# Patient Record
Sex: FEMALE | Race: WHITE | NOT HISPANIC OR LATINO | URBAN - METROPOLITAN AREA
[De-identification: names, ages, dates, MRNs, and addresses within clinical notes are randomized per-mention and may not be internally consistent; named-entity substitution may affect disease eponyms.]

---

## 2014-04-22 RX ORDER — DOCUSATE SODIUM 100 MG
1 CAPSULE ORAL
Qty: 0 | Refills: 0 | COMMUNITY
Start: 2014-04-22

## 2017-09-18 ENCOUNTER — EMERGENCY (EMERGENCY)
Facility: HOSPITAL | Age: 82
LOS: 1 days | Discharge: HOME | End: 2017-09-18
Attending: EMERGENCY MEDICINE | Admitting: EMERGENCY MEDICINE
Payer: MEDICARE

## 2017-09-18 VITALS
TEMPERATURE: 98 F | OXYGEN SATURATION: 95 % | SYSTOLIC BLOOD PRESSURE: 127 MMHG | HEART RATE: 61 BPM | DIASTOLIC BLOOD PRESSURE: 68 MMHG | RESPIRATION RATE: 16 BRPM

## 2017-09-18 VITALS
RESPIRATION RATE: 18 BRPM | SYSTOLIC BLOOD PRESSURE: 187 MMHG | HEART RATE: 72 BPM | OXYGEN SATURATION: 97 % | TEMPERATURE: 99 F | DIASTOLIC BLOOD PRESSURE: 92 MMHG

## 2017-09-18 DIAGNOSIS — M51.36 OTHER INTERVERTEBRAL DISC DEGENERATION, LUMBAR REGION: ICD-10-CM

## 2017-09-18 DIAGNOSIS — E03.9 HYPOTHYROIDISM, UNSPECIFIED: ICD-10-CM

## 2017-09-18 DIAGNOSIS — Z79.82 LONG TERM (CURRENT) USE OF ASPIRIN: ICD-10-CM

## 2017-09-18 DIAGNOSIS — D64.9 ANEMIA, UNSPECIFIED: ICD-10-CM

## 2017-09-18 DIAGNOSIS — Z98.0 INTESTINAL BYPASS AND ANASTOMOSIS STATUS: ICD-10-CM

## 2017-09-18 DIAGNOSIS — Z87.19 PERSONAL HISTORY OF OTHER DISEASES OF THE DIGESTIVE SYSTEM: ICD-10-CM

## 2017-09-18 DIAGNOSIS — K21.9 GASTRO-ESOPHAGEAL REFLUX DISEASE WITHOUT ESOPHAGITIS: ICD-10-CM

## 2017-09-18 DIAGNOSIS — M54.5 LOW BACK PAIN: ICD-10-CM

## 2017-09-18 DIAGNOSIS — M81.0 AGE-RELATED OSTEOPOROSIS WITHOUT CURRENT PATHOLOGICAL FRACTURE: ICD-10-CM

## 2017-09-18 LAB
APPEARANCE UR: CLEAR — SIGNIFICANT CHANGE UP
BACTERIA # UR AUTO: ABNORMAL
BILIRUB UR-MCNC: NEGATIVE — SIGNIFICANT CHANGE UP
COLOR SPEC: YELLOW — SIGNIFICANT CHANGE UP
DIFF PNL FLD: ABNORMAL
EPI CELLS # UR: NEGATIVE — SIGNIFICANT CHANGE UP
GLUCOSE UR QL: NEGATIVE — SIGNIFICANT CHANGE UP
KETONES UR-MCNC: NEGATIVE — SIGNIFICANT CHANGE UP
LEUKOCYTE ESTERASE UR-ACNC: NEGATIVE — SIGNIFICANT CHANGE UP
NITRITE UR-MCNC: NEGATIVE — SIGNIFICANT CHANGE UP
PH UR: 7 — SIGNIFICANT CHANGE UP (ref 5–8)
PROT UR-MCNC: NEGATIVE — SIGNIFICANT CHANGE UP
RBC CASTS # UR COMP ASSIST: SIGNIFICANT CHANGE UP /HPF (ref 0–4)
SP GR SPEC: 1 — LOW (ref 1.01–1.02)
UROBILINOGEN FLD QL: NEGATIVE — SIGNIFICANT CHANGE UP
WBC UR QL: NEGATIVE — SIGNIFICANT CHANGE UP

## 2017-09-18 PROCEDURE — 74176 CT ABD & PELVIS W/O CONTRAST: CPT | Mod: 26

## 2017-09-18 PROCEDURE — 99285 EMERGENCY DEPT VISIT HI MDM: CPT

## 2017-09-18 RX ORDER — TRAMADOL HYDROCHLORIDE 50 MG/1
1 TABLET ORAL
Qty: 30 | Refills: 0 | OUTPATIENT
Start: 2017-09-18

## 2017-09-18 RX ORDER — KETOROLAC TROMETHAMINE 30 MG/ML
15 SYRINGE (ML) INJECTION ONCE
Qty: 0 | Refills: 0 | Status: DISCONTINUED | OUTPATIENT
Start: 2017-09-18 | End: 2017-09-18

## 2017-09-18 RX ORDER — ONDANSETRON 8 MG/1
4 TABLET, FILM COATED ORAL ONCE
Qty: 0 | Refills: 0 | Status: COMPLETED | OUTPATIENT
Start: 2017-09-18 | End: 2017-09-18

## 2017-09-18 RX ORDER — TRAMADOL HYDROCHLORIDE 50 MG/1
25 TABLET ORAL ONCE
Qty: 0 | Refills: 0 | Status: DISCONTINUED | OUTPATIENT
Start: 2017-09-18 | End: 2017-09-18

## 2017-09-18 RX ORDER — SODIUM CHLORIDE 9 MG/ML
1000 INJECTION INTRAMUSCULAR; INTRAVENOUS; SUBCUTANEOUS
Qty: 0 | Refills: 0 | Status: DISCONTINUED | OUTPATIENT
Start: 2017-09-18 | End: 2017-09-22

## 2017-09-18 RX ORDER — MORPHINE SULFATE 50 MG/1
2 CAPSULE, EXTENDED RELEASE ORAL ONCE
Qty: 0 | Refills: 0 | Status: DISCONTINUED | OUTPATIENT
Start: 2017-09-18 | End: 2017-09-18

## 2017-09-18 RX ADMIN — TRAMADOL HYDROCHLORIDE 25 MILLIGRAM(S): 50 TABLET ORAL at 05:42

## 2017-09-18 RX ADMIN — ONDANSETRON 4 MILLIGRAM(S): 8 TABLET, FILM COATED ORAL at 03:50

## 2017-09-18 RX ADMIN — MORPHINE SULFATE 2 MILLIGRAM(S): 50 CAPSULE, EXTENDED RELEASE ORAL at 03:50

## 2017-09-18 RX ADMIN — Medication 15 MILLIGRAM(S): at 07:00

## 2017-09-18 RX ADMIN — Medication 15 MILLIGRAM(S): at 05:42

## 2017-09-18 RX ADMIN — TRAMADOL HYDROCHLORIDE 25 MILLIGRAM(S): 50 TABLET ORAL at 07:00

## 2017-09-18 RX ADMIN — MORPHINE SULFATE 2 MILLIGRAM(S): 50 CAPSULE, EXTENDED RELEASE ORAL at 04:20

## 2017-09-18 NOTE — ED PROVIDER NOTE - MEDICAL DECISION MAKING DETAILS
ro emergent causes for acute nontraumatic back pain in elderly f who has hx of frequent falls ro uti fx aaa

## 2017-09-18 NOTE — ED ADULT NURSE NOTE - PMH
Anemia    Bronchitis    Constipation    Diverticulitis    Fall    GERD (gastroesophageal reflux disease)    Hypothyroid    Osteoporosis    Pelvic fracture    Reflux

## 2017-09-18 NOTE — ED PROVIDER NOTE - MUSCULOSKELETAL, MLM
Spine appears normal, range of motion is currrently  limited because she has pain in her low back at the [id number] regsion to palp neg slr, there is left leg edema at ankle which is old,

## 2017-09-18 NOTE — ED PROVIDER NOTE - CONSTITUTIONAL, MLM
normal... elderly w female who is well nourished, awake, alert, oriented to person, place, time/situation and in no apparent distress, unless she moves, at which point she experiences pain in her back making her nauseated

## 2017-09-18 NOTE — ED ADULT NURSE NOTE - PSH
Hip fracture requiring operative repair  right  Ovarian cyst  right  S/P appendectomy    S/P small bowel resection

## 2017-09-18 NOTE — ED ADULT NURSE REASSESSMENT NOTE - NS ED NURSE REASSESS COMMENT FT1
Patient reports relief from pain. Patient ambulatory with stand by assist. MD aware. Plan for discharge.

## 2017-09-18 NOTE — ED ADULT NURSE NOTE - CHPI ED SYMPTOMS NEG
no bladder dysfunction/no tingling/no bowel dysfunction/no anorexia/no neck tenderness/no constipation/no numbness/no motor function loss

## 2017-09-18 NOTE — ED ADULT NURSE NOTE - OBJECTIVE STATEMENT
89 year old female brought in by EMS from home for complaints of lower back pain. Patient and family report the pain began 2 days ago but has gradually worsened. Patient denies trauma/fall. Patient reports the pain is 0 at rest but begins with movement. Denies pain elsewhere or radiation to legs or upper back. Denies fever/chills. Denies bowel or bladder trouble. Denies numbness/tingling. Complains of mild associated nausea. Patient states recently she is having difficulty ambulating due to pain. Patient took tylenol and ibuprofen without relief.

## 2017-09-18 NOTE — ED PROVIDER NOTE - PROGRESS NOTE DETAILS
pt no longer nauseated did well with pain meds until ct when she reports that she had increased pain again after transferring to and from the ct table.  she was offered additional pain meds but declined at this time. PT asking for ice chips, given, ct reviewed revealing djd with compression deformities to l1 and l3 no acute fractures large stool and then pt asked for more pain pt ambulated around er without difficulty wants to go home feeling better

## 2017-09-18 NOTE — ED PROVIDER NOTE - OBJECTIVE STATEMENT
Pt is a 89 yo f who has hx of low thyroid sp colon surgery for diverticulitis has 2 hernias pmd dr Vamshi pineda never smoker drinker no allergies  otherwise very healthy. presents with progressively worsening low back pain across the low back for 2 days. partially relieved by tylenol yesterday, but today the pain is so severe it is causing nausea.  no weakness numbness no radicular pain \  it began as she stood up from seated position  pt has hx of frequent falls and had in past broken her hip and pelvis  bib ems for eval, accompanied by family who augmented history

## 2017-09-18 NOTE — ED PROVIDER NOTE - CHPI ED SYMPTOMS NEG
no bowel dysfunction/no bladder dysfunction/no numbness/no motor function loss/no neck tenderness/no tingling

## 2017-09-19 ENCOUNTER — INPATIENT (INPATIENT)
Facility: HOSPITAL | Age: 82
LOS: 6 days | Discharge: NURSING, REHAB OR LONG-TERM CARE | DRG: 418 | End: 2017-09-26
Attending: FAMILY MEDICINE | Admitting: FAMILY MEDICINE
Payer: MEDICARE

## 2017-09-19 VITALS
SYSTOLIC BLOOD PRESSURE: 189 MMHG | DIASTOLIC BLOOD PRESSURE: 99 MMHG | RESPIRATION RATE: 16 BRPM | OXYGEN SATURATION: 97 % | TEMPERATURE: 99 F | WEIGHT: 115.08 LBS | HEART RATE: 74 BPM

## 2017-09-19 DIAGNOSIS — M54.16 RADICULOPATHY, LUMBAR REGION: ICD-10-CM

## 2017-09-19 DIAGNOSIS — R11.10 VOMITING, UNSPECIFIED: ICD-10-CM

## 2017-09-19 DIAGNOSIS — R10.9 UNSPECIFIED ABDOMINAL PAIN: ICD-10-CM

## 2017-09-19 DIAGNOSIS — R93.7 ABNORMAL FINDINGS ON DIAGNOSTIC IMAGING OF OTHER PARTS OF MUSCULOSKELETAL SYSTEM: ICD-10-CM

## 2017-09-19 DIAGNOSIS — Z79.82 LONG TERM (CURRENT) USE OF ASPIRIN: ICD-10-CM

## 2017-09-19 DIAGNOSIS — M51.16 INTERVERTEBRAL DISC DISORDERS WITH RADICULOPATHY, LUMBAR REGION: ICD-10-CM

## 2017-09-19 DIAGNOSIS — Z29.9 ENCOUNTER FOR PROPHYLACTIC MEASURES, UNSPECIFIED: ICD-10-CM

## 2017-09-19 DIAGNOSIS — M54.9 DORSALGIA, UNSPECIFIED: ICD-10-CM

## 2017-09-19 DIAGNOSIS — M54.5 LOW BACK PAIN: ICD-10-CM

## 2017-09-19 DIAGNOSIS — Z91.81 HISTORY OF FALLING: ICD-10-CM

## 2017-09-19 LAB
ALBUMIN SERPL ELPH-MCNC: 3.6 G/DL — SIGNIFICANT CHANGE UP (ref 3.3–5)
ALP SERPL-CCNC: 76 U/L — SIGNIFICANT CHANGE UP (ref 40–120)
ALT FLD-CCNC: 20 U/L — SIGNIFICANT CHANGE UP (ref 12–78)
AMYLASE P1 CFR SERPL: 33 U/L — SIGNIFICANT CHANGE UP (ref 25–115)
ANION GAP SERPL CALC-SCNC: 8 MMOL/L — SIGNIFICANT CHANGE UP (ref 5–17)
AST SERPL-CCNC: 21 U/L — SIGNIFICANT CHANGE UP (ref 15–37)
BILIRUB SERPL-MCNC: 1.1 MG/DL — SIGNIFICANT CHANGE UP (ref 0.2–1.2)
BUN SERPL-MCNC: 15 MG/DL — SIGNIFICANT CHANGE UP (ref 7–23)
CALCIUM SERPL-MCNC: 9.2 MG/DL — SIGNIFICANT CHANGE UP (ref 8.5–10.1)
CHLORIDE SERPL-SCNC: 93 MMOL/L — LOW (ref 96–108)
CO2 SERPL-SCNC: 25 MMOL/L — SIGNIFICANT CHANGE UP (ref 22–31)
CREAT SERPL-MCNC: 0.53 MG/DL — SIGNIFICANT CHANGE UP (ref 0.5–1.3)
CULTURE RESULTS: SIGNIFICANT CHANGE UP
GLUCOSE SERPL-MCNC: 96 MG/DL — SIGNIFICANT CHANGE UP (ref 70–99)
HCT VFR BLD CALC: 36.2 % — SIGNIFICANT CHANGE UP (ref 34.5–45)
HGB BLD-MCNC: 12.4 G/DL — SIGNIFICANT CHANGE UP (ref 11.5–15.5)
LIDOCAIN IGE QN: 45 U/L — LOW (ref 73–393)
MCHC RBC-ENTMCNC: 32.3 PG — SIGNIFICANT CHANGE UP (ref 27–34)
MCHC RBC-ENTMCNC: 34.3 GM/DL — SIGNIFICANT CHANGE UP (ref 32–36)
MCV RBC AUTO: 94.2 FL — SIGNIFICANT CHANGE UP (ref 80–100)
PLATELET # BLD AUTO: 213 K/UL — SIGNIFICANT CHANGE UP (ref 150–400)
POTASSIUM SERPL-MCNC: 4.2 MMOL/L — SIGNIFICANT CHANGE UP (ref 3.5–5.3)
POTASSIUM SERPL-SCNC: 4.2 MMOL/L — SIGNIFICANT CHANGE UP (ref 3.5–5.3)
PROT SERPL-MCNC: 7.3 G/DL — SIGNIFICANT CHANGE UP (ref 6–8.3)
RBC # BLD: 3.84 M/UL — SIGNIFICANT CHANGE UP (ref 3.8–5.2)
RBC # FLD: 11.9 % — SIGNIFICANT CHANGE UP (ref 10.3–14.5)
SODIUM SERPL-SCNC: 126 MMOL/L — LOW (ref 135–145)
SPECIMEN SOURCE: SIGNIFICANT CHANGE UP
WBC # BLD: 10.5 K/UL — SIGNIFICANT CHANGE UP (ref 3.8–10.5)
WBC # FLD AUTO: 10.5 K/UL — SIGNIFICANT CHANGE UP (ref 3.8–10.5)

## 2017-09-19 PROCEDURE — 76705 ECHO EXAM OF ABDOMEN: CPT | Mod: 26

## 2017-09-19 PROCEDURE — 99285 EMERGENCY DEPT VISIT HI MDM: CPT

## 2017-09-19 PROCEDURE — 78226 HEPATOBILIARY SYSTEM IMAGING: CPT | Mod: 26

## 2017-09-19 RX ORDER — SODIUM CHLORIDE 9 MG/ML
1000 INJECTION INTRAMUSCULAR; INTRAVENOUS; SUBCUTANEOUS ONCE
Qty: 0 | Refills: 0 | Status: COMPLETED | OUTPATIENT
Start: 2017-09-19 | End: 2017-09-19

## 2017-09-19 RX ORDER — HEPARIN SODIUM 5000 [USP'U]/ML
5000 INJECTION INTRAVENOUS; SUBCUTANEOUS EVERY 12 HOURS
Qty: 0 | Refills: 0 | Status: DISCONTINUED | OUTPATIENT
Start: 2017-09-19 | End: 2017-09-19

## 2017-09-19 RX ORDER — MULTIVIT-MIN/FERROUS GLUCONATE 9 MG/15 ML
1 LIQUID (ML) ORAL DAILY
Qty: 0 | Refills: 0 | Status: DISCONTINUED | OUTPATIENT
Start: 2017-09-19 | End: 2017-09-19

## 2017-09-19 RX ORDER — PANTOPRAZOLE SODIUM 20 MG/1
40 TABLET, DELAYED RELEASE ORAL DAILY
Qty: 0 | Refills: 0 | Status: DISCONTINUED | OUTPATIENT
Start: 2017-09-19 | End: 2017-09-20

## 2017-09-19 RX ORDER — DOCUSATE SODIUM 100 MG
100 CAPSULE ORAL THREE TIMES A DAY
Qty: 0 | Refills: 0 | Status: DISCONTINUED | OUTPATIENT
Start: 2017-09-19 | End: 2017-09-19

## 2017-09-19 RX ORDER — PREGABALIN 225 MG/1
500 CAPSULE ORAL DAILY
Qty: 0 | Refills: 0 | Status: DISCONTINUED | OUTPATIENT
Start: 2017-09-19 | End: 2017-09-19

## 2017-09-19 RX ORDER — POLYETHYLENE GLYCOL 3350 17 G/17G
17 POWDER, FOR SOLUTION ORAL DAILY
Qty: 0 | Refills: 0 | Status: DISCONTINUED | OUTPATIENT
Start: 2017-09-19 | End: 2017-09-19

## 2017-09-19 RX ORDER — INFLUENZA VIRUS VACCINE 15; 15; 15; 15 UG/.5ML; UG/.5ML; UG/.5ML; UG/.5ML
0.5 SUSPENSION INTRAMUSCULAR ONCE
Qty: 0 | Refills: 0 | Status: DISCONTINUED | OUTPATIENT
Start: 2017-09-19 | End: 2017-09-26

## 2017-09-19 RX ORDER — LIDOCAINE 4 G/100G
1 CREAM TOPICAL DAILY
Qty: 0 | Refills: 0 | Status: DISCONTINUED | OUTPATIENT
Start: 2017-09-19 | End: 2017-09-20

## 2017-09-19 RX ORDER — MORPHINE SULFATE 50 MG/1
2 CAPSULE, EXTENDED RELEASE ORAL EVERY 6 HOURS
Qty: 0 | Refills: 0 | Status: DISCONTINUED | OUTPATIENT
Start: 2017-09-19 | End: 2017-09-20

## 2017-09-19 RX ORDER — ASPIRIN/CALCIUM CARB/MAGNESIUM 324 MG
81 TABLET ORAL DAILY
Qty: 0 | Refills: 0 | Status: DISCONTINUED | OUTPATIENT
Start: 2017-09-19 | End: 2017-09-19

## 2017-09-19 RX ORDER — PANTOPRAZOLE SODIUM 20 MG/1
40 TABLET, DELAYED RELEASE ORAL
Qty: 0 | Refills: 0 | Status: DISCONTINUED | OUTPATIENT
Start: 2017-09-19 | End: 2017-09-19

## 2017-09-19 RX ORDER — FOLIC ACID 0.8 MG
1 TABLET ORAL DAILY
Qty: 0 | Refills: 0 | Status: DISCONTINUED | OUTPATIENT
Start: 2017-09-19 | End: 2017-09-19

## 2017-09-19 RX ORDER — ONDANSETRON 8 MG/1
4 TABLET, FILM COATED ORAL EVERY 6 HOURS
Qty: 0 | Refills: 0 | Status: DISCONTINUED | OUTPATIENT
Start: 2017-09-19 | End: 2017-09-20

## 2017-09-19 RX ORDER — TRAMADOL HYDROCHLORIDE 50 MG/1
50 TABLET ORAL EVERY 6 HOURS
Qty: 0 | Refills: 0 | Status: DISCONTINUED | OUTPATIENT
Start: 2017-09-19 | End: 2017-09-19

## 2017-09-19 RX ORDER — FERROUS SULFATE 325(65) MG
325 TABLET ORAL DAILY
Qty: 0 | Refills: 0 | Status: DISCONTINUED | OUTPATIENT
Start: 2017-09-19 | End: 2017-09-19

## 2017-09-19 RX ADMIN — LIDOCAINE 1 PATCH: 4 CREAM TOPICAL at 20:11

## 2017-09-19 RX ADMIN — MORPHINE SULFATE 2 MILLIGRAM(S): 50 CAPSULE, EXTENDED RELEASE ORAL at 21:36

## 2017-09-19 RX ADMIN — MORPHINE SULFATE 2 MILLIGRAM(S): 50 CAPSULE, EXTENDED RELEASE ORAL at 20:12

## 2017-09-19 RX ADMIN — HEPARIN SODIUM 5000 UNIT(S): 5000 INJECTION INTRAVENOUS; SUBCUTANEOUS at 20:11

## 2017-09-19 RX ADMIN — SODIUM CHLORIDE 2000 MILLILITER(S): 9 INJECTION INTRAMUSCULAR; INTRAVENOUS; SUBCUTANEOUS at 13:48

## 2017-09-19 RX ADMIN — ONDANSETRON 4 MILLIGRAM(S): 8 TABLET, FILM COATED ORAL at 20:12

## 2017-09-19 NOTE — H&P ADULT - NSHPSOCIALHISTORY_GEN_ALL_CORE
Alcohol Use History:  · Have you ever consumed alcohol  unknown    Tobacco Usage:  · Tobacco Usage  Unknown if ever smoked

## 2017-09-19 NOTE — H&P ADULT - NSHPREVIEWOFSYSTEMS_GEN_ALL_CORE
· GASTROINTESTINAL: - - -  · Gastrointestinal [+]: ABDOMINAL PAIN, NAUSEA  · MUSCULOSKELETAL: - - -  · Musculoskeletal [+]: BACK PAIN  · ROS STATEMENT: all other ROS negative except as per HPI

## 2017-09-19 NOTE — H&P ADULT - NSHPLABSRESULTS_GEN_ALL_CORE
126<L>  |  93<L>  |  15  ----------------------------<  96  4.2   |  25  |  0.53    Ca    9.2      19 Sep 2017 13:48    TPro  7.3  /  Alb  3.6  /  TBili  1.1  /  DBili  x   /  AST  21  /  ALT  20  /  AlkPhos  76                              12.4   10.5  )-----------( 213      ( 19 Sep 2017 13:48 )             36.2             LIVER FUNCTIONS - ( 19 Sep 2017 13:48 )  Alb: 3.6 g/dL / Pro: 7.3 g/dL / ALK PHOS: 76 U/L / ALT: 20 U/L / AST: 21 U/L / GGT: x                 Urinalysis Basic - ( 18 Sep 2017 06:11 )    Color: Yellow / Appearance: Clear / S.005 / pH: x  Gluc: x / Ketone: Negative  / Bili: Negative / Urobili: Negative   Blood: x / Protein: Negative / Nitrite: Negative   Leuk Esterase: Negative / RBC: 0-2 /HPF / WBC Negative   Sq Epi: x / Non Sq Epi: Negative / Bacteria: Few

## 2017-09-19 NOTE — H&P ADULT - PMH
Anemia    Bronchitis    Constipation    Diverticulitis    Fall    GERD (gastroesophageal reflux disease)    Hypothyroid    Osteoporosis    Pelvic fracture    Reflux Anemia    Bronchitis    Constipation    Diverticulitis    Diverticulitis    Fall    GERD (gastroesophageal reflux disease)    Hypothyroid    Osteoporosis    Pelvic fracture    Reflux

## 2017-09-19 NOTE — ED PROVIDER NOTE - CONSTITUTIONAL, MLM
normal... Thin elderly white female, well nourished, awake, alert, oriented to person, place, time/situation and in mild apparent distress.

## 2017-09-19 NOTE — H&P ADULT - HISTORY OF PRESENT ILLNESS
89 yo white female with few days of positional low bilateral back pain. Seen here yesterday and had ct scan which revealed marled degenerative  changes and stool. Ultram was prescribed but patient then developed worsening nausea and then occasional vomiting. Feels weaker today. Poor oral intake. Mild right sided abdominal pains x few days. No frequency or dysuria.  In ER patient was found to have RUQ tender with + escamilla's sign.  Patient is being admitted for further work up and treatment.

## 2017-09-19 NOTE — H&P ADULT - NSHPPHYSICALEXAM_GEN_ALL_CORE
· CONSTITUTIONAL: Thin elderly white female, well nourished, awake, alert, oriented to person, place, time/situation and in mild apparent distress.  · ENMT: Airway patent  · HEAD: Head atraumatic, normal cephalic shape.  · HEAD: Head is atraumatic. Head shape is symmetrical.  · EYES: Clear bilaterally, pupils equal, round and reactive to light.  · CARDIAC: Normal rate, regular rhythm.  Heart sounds S1, S2.  No murmurs, rubs or gallops.  · RESPIRATORY: Breath sounds clear and equal bilaterally.  · GASTROINTESTINAL: Abdomen soft, mild tenderness to palpation in RUQ  · MUSCULOSKELETAL: Moderate lower lumbar spine tenderness  · NEUROLOGICAL: No motor or sensory deficits.

## 2017-09-19 NOTE — ED PROVIDER NOTE - OBJECTIVE STATEMENT
89 yo white female with few days of positional low bilateral back pain. Seen here yesterday and had ct scan which revealed marled degenerative  changes and stool. Ultram was prescribed but patient then developed worsening nausea and then occasional vomiting. Feels weaker today. Poor oral intake. Mild right sided abdominal pains x few days. No frequency or dysuria.

## 2017-09-20 ENCOUNTER — TRANSCRIPTION ENCOUNTER (OUTPATIENT)
Age: 82
End: 2017-09-20

## 2017-09-20 ENCOUNTER — RESULT REVIEW (OUTPATIENT)
Age: 82
End: 2017-09-20

## 2017-09-20 DIAGNOSIS — J96.90 RESPIRATORY FAILURE, UNSPECIFIED, UNSPECIFIED WHETHER WITH HYPOXIA OR HYPERCAPNIA: ICD-10-CM

## 2017-09-20 DIAGNOSIS — M54.5 LOW BACK PAIN: ICD-10-CM

## 2017-09-20 DIAGNOSIS — K81.9 CHOLECYSTITIS, UNSPECIFIED: ICD-10-CM

## 2017-09-20 DIAGNOSIS — R41.82 ALTERED MENTAL STATUS, UNSPECIFIED: ICD-10-CM

## 2017-09-20 DIAGNOSIS — E03.9 HYPOTHYROIDISM, UNSPECIFIED: ICD-10-CM

## 2017-09-20 DIAGNOSIS — K81.0 ACUTE CHOLECYSTITIS: ICD-10-CM

## 2017-09-20 LAB
ANION GAP SERPL CALC-SCNC: 14 MMOL/L — SIGNIFICANT CHANGE UP (ref 5–17)
BASE EXCESS BLDA CALC-SCNC: -9.1 MMOL/L — LOW (ref -2–2)
BLOOD GAS COMMENTS ARTERIAL: SIGNIFICANT CHANGE UP
BUN SERPL-MCNC: 12 MG/DL — SIGNIFICANT CHANGE UP (ref 7–23)
CALCIUM SERPL-MCNC: 9.2 MG/DL — SIGNIFICANT CHANGE UP (ref 8.5–10.1)
CHLORIDE SERPL-SCNC: 102 MMOL/L — SIGNIFICANT CHANGE UP (ref 96–108)
CO2 SERPL-SCNC: 22 MMOL/L — SIGNIFICANT CHANGE UP (ref 22–31)
CREAT SERPL-MCNC: 0.64 MG/DL — SIGNIFICANT CHANGE UP (ref 0.5–1.3)
GLUCOSE SERPL-MCNC: 67 MG/DL — LOW (ref 70–99)
HCO3 BLDA-SCNC: 17 MMOL/L — LOW (ref 23–27)
HCT VFR BLD CALC: 34.9 % — SIGNIFICANT CHANGE UP (ref 34.5–45)
HGB BLD-MCNC: 11.9 G/DL — SIGNIFICANT CHANGE UP (ref 11.5–15.5)
HOROWITZ INDEX BLDA+IHG-RTO: 40 — SIGNIFICANT CHANGE UP
MCHC RBC-ENTMCNC: 32.6 PG — SIGNIFICANT CHANGE UP (ref 27–34)
MCHC RBC-ENTMCNC: 34.2 GM/DL — SIGNIFICANT CHANGE UP (ref 32–36)
MCV RBC AUTO: 95.3 FL — SIGNIFICANT CHANGE UP (ref 80–100)
PCO2 BLDA: 39 MMHG — SIGNIFICANT CHANGE UP (ref 32–46)
PH BLDA: 7.26 — LOW (ref 7.35–7.45)
PLATELET # BLD AUTO: 197 K/UL — SIGNIFICANT CHANGE UP (ref 150–400)
PO2 BLDA: 117 MMHG — HIGH (ref 74–108)
POTASSIUM SERPL-MCNC: 3.4 MMOL/L — LOW (ref 3.5–5.3)
POTASSIUM SERPL-SCNC: 3.4 MMOL/L — LOW (ref 3.5–5.3)
RBC # BLD: 3.66 M/UL — LOW (ref 3.8–5.2)
RBC # FLD: 12.5 % — SIGNIFICANT CHANGE UP (ref 10.3–14.5)
SAO2 % BLDA: 97 % — HIGH (ref 92–96)
SODIUM SERPL-SCNC: 138 MMOL/L — SIGNIFICANT CHANGE UP (ref 135–145)
URATE SERPL-MCNC: 4.9 MG/DL — SIGNIFICANT CHANGE UP (ref 2.5–7)
WBC # BLD: 7.1 K/UL — SIGNIFICANT CHANGE UP (ref 3.8–10.5)
WBC # FLD AUTO: 7.1 K/UL — SIGNIFICANT CHANGE UP (ref 3.8–10.5)

## 2017-09-20 PROCEDURE — 71010: CPT | Mod: 26

## 2017-09-20 PROCEDURE — 71010: CPT | Mod: 26,77

## 2017-09-20 PROCEDURE — 88304 TISSUE EXAM BY PATHOLOGIST: CPT | Mod: 26

## 2017-09-20 PROCEDURE — 93010 ELECTROCARDIOGRAM REPORT: CPT

## 2017-09-20 PROCEDURE — 72100 X-RAY EXAM L-S SPINE 2/3 VWS: CPT | Mod: 26

## 2017-09-20 PROCEDURE — 74181 MRI ABDOMEN W/O CONTRAST: CPT | Mod: 26

## 2017-09-20 RX ORDER — SODIUM CHLORIDE 9 MG/ML
1000 INJECTION INTRAMUSCULAR; INTRAVENOUS; SUBCUTANEOUS
Qty: 0 | Refills: 0 | Status: DISCONTINUED | OUTPATIENT
Start: 2017-09-20 | End: 2017-09-21

## 2017-09-20 RX ORDER — ONDANSETRON 8 MG/1
4 TABLET, FILM COATED ORAL EVERY 6 HOURS
Qty: 0 | Refills: 0 | Status: DISCONTINUED | OUTPATIENT
Start: 2017-09-20 | End: 2017-09-23

## 2017-09-20 RX ORDER — ASPIRIN/CALCIUM CARB/MAGNESIUM 324 MG
81 TABLET ORAL DAILY
Qty: 0 | Refills: 0 | Status: DISCONTINUED | OUTPATIENT
Start: 2017-09-20 | End: 2017-09-26

## 2017-09-20 RX ORDER — PANTOPRAZOLE SODIUM 20 MG/1
40 TABLET, DELAYED RELEASE ORAL DAILY
Qty: 0 | Refills: 0 | Status: DISCONTINUED | OUTPATIENT
Start: 2017-09-20 | End: 2017-09-22

## 2017-09-20 RX ORDER — CIPROFLOXACIN LACTATE 400MG/40ML
200 VIAL (ML) INTRAVENOUS ONCE
Qty: 0 | Refills: 0 | Status: COMPLETED | OUTPATIENT
Start: 2017-09-20 | End: 2017-09-20

## 2017-09-20 RX ORDER — MORPHINE SULFATE 50 MG/1
2 CAPSULE, EXTENDED RELEASE ORAL EVERY 6 HOURS
Qty: 0 | Refills: 0 | Status: DISCONTINUED | OUTPATIENT
Start: 2017-09-20 | End: 2017-09-21

## 2017-09-20 RX ORDER — SODIUM CHLORIDE 9 MG/ML
1000 INJECTION, SOLUTION INTRAVENOUS
Qty: 0 | Refills: 0 | Status: DISCONTINUED | OUTPATIENT
Start: 2017-09-20 | End: 2017-09-20

## 2017-09-20 RX ORDER — HYDROMORPHONE HYDROCHLORIDE 2 MG/ML
0.5 INJECTION INTRAMUSCULAR; INTRAVENOUS; SUBCUTANEOUS
Qty: 0 | Refills: 0 | Status: DISCONTINUED | OUTPATIENT
Start: 2017-09-20 | End: 2017-09-20

## 2017-09-20 RX ORDER — HEPARIN SODIUM 5000 [USP'U]/ML
5000 INJECTION INTRAVENOUS; SUBCUTANEOUS EVERY 12 HOURS
Qty: 0 | Refills: 0 | Status: DISCONTINUED | OUTPATIENT
Start: 2017-09-20 | End: 2017-09-22

## 2017-09-20 RX ORDER — METRONIDAZOLE 500 MG
500 TABLET ORAL ONCE
Qty: 0 | Refills: 0 | Status: COMPLETED | OUTPATIENT
Start: 2017-09-20 | End: 2017-09-20

## 2017-09-20 RX ORDER — LIDOCAINE 4 G/100G
1 CREAM TOPICAL DAILY
Qty: 0 | Refills: 0 | Status: DISCONTINUED | OUTPATIENT
Start: 2017-09-20 | End: 2017-09-26

## 2017-09-20 RX ORDER — SODIUM CHLORIDE 9 MG/ML
1000 INJECTION INTRAMUSCULAR; INTRAVENOUS; SUBCUTANEOUS
Qty: 0 | Refills: 0 | Status: DISCONTINUED | OUTPATIENT
Start: 2017-09-20 | End: 2017-09-20

## 2017-09-20 RX ORDER — CIPROFLOXACIN LACTATE 400MG/40ML
200 VIAL (ML) INTRAVENOUS EVERY 12 HOURS
Qty: 0 | Refills: 0 | Status: DISCONTINUED | OUTPATIENT
Start: 2017-09-20 | End: 2017-09-20

## 2017-09-20 RX ORDER — PANTOPRAZOLE SODIUM 20 MG/1
40 TABLET, DELAYED RELEASE ORAL DAILY
Qty: 0 | Refills: 0 | Status: DISCONTINUED | OUTPATIENT
Start: 2017-09-20 | End: 2017-09-20

## 2017-09-20 RX ORDER — ONDANSETRON 8 MG/1
4 TABLET, FILM COATED ORAL ONCE
Qty: 0 | Refills: 0 | Status: DISCONTINUED | OUTPATIENT
Start: 2017-09-20 | End: 2017-09-20

## 2017-09-20 RX ORDER — OXYCODONE HYDROCHLORIDE 5 MG/1
5 TABLET ORAL EVERY 4 HOURS
Qty: 0 | Refills: 0 | Status: DISCONTINUED | OUTPATIENT
Start: 2017-09-20 | End: 2017-09-20

## 2017-09-20 RX ORDER — METRONIDAZOLE 500 MG
500 TABLET ORAL EVERY 8 HOURS
Qty: 0 | Refills: 0 | Status: DISCONTINUED | OUTPATIENT
Start: 2017-09-20 | End: 2017-09-20

## 2017-09-20 RX ORDER — METRONIDAZOLE 500 MG
TABLET ORAL
Qty: 0 | Refills: 0 | Status: DISCONTINUED | OUTPATIENT
Start: 2017-09-20 | End: 2017-09-20

## 2017-09-20 RX ORDER — CIPROFLOXACIN LACTATE 400MG/40ML
VIAL (ML) INTRAVENOUS
Qty: 0 | Refills: 0 | Status: DISCONTINUED | OUTPATIENT
Start: 2017-09-20 | End: 2017-09-20

## 2017-09-20 RX ORDER — HYDROMORPHONE HYDROCHLORIDE 2 MG/ML
0.5 INJECTION INTRAMUSCULAR; INTRAVENOUS; SUBCUTANEOUS EVERY 6 HOURS
Qty: 0 | Refills: 0 | Status: DISCONTINUED | OUTPATIENT
Start: 2017-09-20 | End: 2017-09-21

## 2017-09-20 RX ORDER — TRAMADOL HYDROCHLORIDE 50 MG/1
50 TABLET ORAL EVERY 4 HOURS
Qty: 0 | Refills: 0 | Status: DISCONTINUED | OUTPATIENT
Start: 2017-09-20 | End: 2017-09-21

## 2017-09-20 RX ADMIN — SODIUM CHLORIDE 75 MILLILITER(S): 9 INJECTION, SOLUTION INTRAVENOUS at 20:03

## 2017-09-20 RX ADMIN — PANTOPRAZOLE SODIUM 40 MILLIGRAM(S): 20 TABLET, DELAYED RELEASE ORAL at 12:56

## 2017-09-20 RX ADMIN — LIDOCAINE 1 PATCH: 4 CREAM TOPICAL at 12:56

## 2017-09-20 RX ADMIN — HYDROMORPHONE HYDROCHLORIDE 0.5 MILLIGRAM(S): 2 INJECTION INTRAMUSCULAR; INTRAVENOUS; SUBCUTANEOUS at 13:05

## 2017-09-20 RX ADMIN — Medication 100 MILLIGRAM(S): at 11:18

## 2017-09-20 RX ADMIN — ONDANSETRON 4 MILLIGRAM(S): 8 TABLET, FILM COATED ORAL at 08:59

## 2017-09-20 RX ADMIN — Medication 100 MILLIGRAM(S): at 08:16

## 2017-09-20 RX ADMIN — HYDROMORPHONE HYDROCHLORIDE 0.5 MILLIGRAM(S): 2 INJECTION INTRAMUSCULAR; INTRAVENOUS; SUBCUTANEOUS at 08:35

## 2017-09-20 NOTE — CONSULT NOTE ADULT - SUBJECTIVE AND OBJECTIVE BOX
89y  Female  No Known Allergies    CC; Patient is a 89y old  Female who presented with a chief complaint of abd pain     HPI:   89 yo white female with few days of positional low bilateral back pain. Seen here yesterday and had ct scan which revealed marled degenerative  changes and stool. Ultram was prescribed but patient then developed worsening nausea and then occasional vomiting. Feels weaker today. Poor oral intake. Mild right sided abdominal pains x few days. No frequency or dysuria.  In ER patient was found to have RUQ tender with + escamilla's sign. The patient was taken to OR for laprascopic Choley, post op in PACU after 3 hrs was remainig lethargic and had periods of apnea on CPAP.          PAST MEDICAL & SURGICAL HISTORY:  Diverticulitis  Pelvic fracture  Fall  Anemia  Constipation  Osteoporosis  Diverticulitis  GERD (gastroesophageal reflux disease)  Hypothyroid  Reflux  Bronchitis  S/P small bowel resection  Ovarian cyst: right  S/P appendectomy  Hip fracture requiring operative repair: right    FAMILY HISTORY:  No pertinent family history in first degree relatives      Vitals   Vital Signs Last 24 Hrs  T(C): 36.8 (20 Sep 2017 22:48), Max: 36.8 (20 Sep 2017 15:23)  T(F): 98.2 (20 Sep 2017 22:48), Max: 98.2 (20 Sep 2017 15:23)  HR: 67 (20 Sep 2017 23:00) (54 - 96)  BP: 137/69 (20 Sep 2017 23:00) (84/52 - 143/97)  BP(mean): 98 (20 Sep 2017 23:00) (98 - 99)  RR: 14 (20 Sep 2017 23:00) (14 - 17)  SpO2: 100% (20 Sep 2017 23:00) (93% - 100%)  ABG - ( 20 Sep 2017 21:38 )  pH: 7.26  /  pCO2: 39    /  pO2: 117   / HCO3: 17    / Base Excess: -9.1  /  SaO2: 97                I&O's Summary    20 Sep 2017 07:01  -  20 Sep 2017 23:42  --------------------------------------------------------  IN: 300 mL / OUT: 0 mL / NET: 300 mL        LABS  09-20    138  |  102  |  12  ----------------------------<  67<L>  3.4<L>   |  22  |  0.64    Ca    9.2      20 Sep 2017 14:20    TPro  7.3  /  Alb  3.6  /  TBili  1.1  /  DBili  x   /  AST  21  /  ALT  20  /  AlkPhos  76  09-19                          11.9   7.1   )-----------( 197      ( 20 Sep 2017 14:37 )             34.9           LIVER FUNCTIONS - ( 19 Sep 2017 13:48 )  Alb: 3.6 g/dL / Pro: 7.3 g/dL / ALK PHOS: 76 U/L / ALT: 20 U/L / AST: 21 U/L / GGT: x           CAPILLARY BLOOD GLUCOSE            VENT SETTINGS   Mode: AC/ CMV (Assist Control/ Continuous Mandatory Ventilation)  RR (machine): 14  TV (machine): 500  FiO2: 40  PEEP: 5  ITime: 0.9  MAP: 8  PIP: 21      Meds  MEDICATIONS  (STANDING):  influenza   Vaccine 0.5 milliLiter(s) IntraMuscular once  aspirin enteric coated 81 milliGRAM(s) Oral daily  heparin  Injectable 5000 Unit(s) SubCutaneous every 12 hours  sodium chloride 0.9%. 1000 milliLiter(s) (50 mL/Hr) IV Continuous <Continuous>  lidocaine   Patch 1 Patch Transdermal daily  calcium carbonate  625 mG + Vitamin D (OsCal 250 + D) 1 Tablet(s) Oral daily  pantoprazole  Injectable 40 milliGRAM(s) IV Push daily  pantoprazole  Injectable 40 milliGRAM(s) IV Push daily  sodium chloride 0.9%. 1000 milliLiter(s) (75 mL/Hr) IV Continuous <Continuous>      REVIEW OF SYSTEMS:    lethargic and on mechanical ventilation    Physicial Exam:     Constitutional: NAD, well-groomed, well-developed  HEENT: PERRLA, EOMI, no drainage or redness  Neck: No bruits; no thyromegaly or nodules,  No JVD  Back: Normal spine flexure, No CVA tenderness, No deformity or limitation of movement  Respiratory: Breath Sounds equal & clear to percussion & auscultation, no accessory muscle use  Cardiovascular: Regular rate & rhythm, normal S1, S2; no murmurs, gallops or rubs; no S3, S4  Gastrointestinal: Soft, non-tender, non distended no hepatosplenomegaly, normal bowel sounds  Extremities: No peripheral edema, No cyanosis, clubbing   Vascular: Equal and normal pulses: 2+ peripheral pulses throughout  Neurological: GCS:    A&O x 3; no sensory, motor  deficits, normal reflexes  Psychiatric: Normal mood, normal affect  Musculoskeletal: No joint pain, swelling or deformity; no limitation of movement  Skin: No rashes

## 2017-09-20 NOTE — PROGRESS NOTE ADULT - SUBJECTIVE AND OBJECTIVE BOX
PULMONARY/CRITICAL CARE      INTERVAL HPI/OVERNIGHT EVENTS:    89y FemaleHPI: Admitted for back pain, ruq pain. Found to have acute Cholecystitis on HIDA.  89 yo white female with few days of positional low bilateral back pain. Seen here yesterday and had ct scan which revealed marled degenerative  changes and stool. Ultram was prescribed but patient then developed worsening nausea and then occasional vomiting. Feels weaker today. Poor oral intake. Mild right sided abdominal pains x few days. No frequency or dysuria.  In ER patient was found to have RUQ tender with + escamilla's sign.  Patient is being admitted for further work up and treatment. (19 Sep 2017 16:45)        PAST MEDICAL & SURGICAL HISTORY:  Diverticulitis  Pelvic fracture  Fall  Anemia  Constipation  Osteoporosis  Diverticulitis  GERD (gastroesophageal reflux disease)  Hypothyroid  Reflux  Bronchitis  S/P small bowel resection  Ovarian cyst: right  S/P appendectomy  Hip fracture requiring operative repair: right        ICU Vital Signs Last 24 Hrs  T(C): 36.6 (20 Sep 2017 04:40), Max: 37.3 (19 Sep 2017 22:42)  T(F): 97.8 (20 Sep 2017 04:40), Max: 99.2 (19 Sep 2017 22:42)  HR: 79 (20 Sep 2017 04:40) (65 - 79)  BP: 143/97 (20 Sep 2017 04:40) (126/72 - 189/99)  BP(mean): --  ABP: --  ABP(mean): --  RR: 17 (20 Sep 2017 04:40) (16 - 17)  SpO2: 93% (20 Sep 2017 04:40) (90% - 98%)    Qtts:     I&O's Summary          REVIEW OF SYSTEMS:    CONSTITUTIONAL: No fever, weight loss, or fatigue  EYES: No eye pain, visual disturbances, or discharge  ENMT:  No difficulty hearing, tinnitus, vertigo; No sinus or throat pain  NECK: No pain or stiffness  BREASTS: No pain, masses, or nipple discharge  RESPIRATORY: No cough, wheezing, chills or hemoptysis; No shortness of breath  CARDIOVASCULAR: No chest pain, palpitations, dizziness, or leg swelling  GASTROINTESTINAL: Has abdominal  pain. No nausea, vomiting, or hematemesis; No diarrhea or constipation. No melena or hematochezia.  GENITOURINARY: No dysuria, frequency, hematuria, or incontinence  NEUROLOGICAL: No headaches, memory loss, loss of strength, numbness, or tremors  SKIN: No itching, burning, rashes, or lesions   LYMPH NODES: No enlarged glands  ENDOCRINE: No heat or cold intolerance; No hair loss  MUSCULOSKELETAL: No joint pain or swelling; No muscle,, or extremity pain, no calf tenderness Had low back pain following fall.  PSYCHIATRIC: No depression, anxiety, mood swings, or difficulty sleeping  HEME/LYMPH: No easy bruising, or bleeding gums  ALLERGY AND IMMUNOLOGIC: No hives or eczema      PHYSICAL EXAM:    GENERAL: NAD, well-groomed, well-developed, NAD  HEAD:  Atraumatic, Normocephalic  EYES: EOMI, PERRLA, conjunctiva and sclera clear  ENMT: No tonsillar erythema, exudates, or enlargement; Moist mucous membranes, Good dentition, No lesions  NECK: Supple, No JVD, Normal thyroid  NERVOUS SYSTEM:  Alert & Oriented X3, Good concentration; Motor Strength 5/5 B/L upper and lower extremities  CHEST/LUNG: Clear to percussion bilaterally; No rales, rhonchi, wheezing, or rubs  HEART: Regular rate and rhythm; No murmurs, rubs, or gallops  ABDOMEN: Mild ruq tenderness, Nondistended; Bowel sounds present  EXTREMITIES:  2+ Peripheral Pulses, No clubbing, cyanosis, or edema  LYMPH: No lymphadenopathy noted  SKIN: No rashes or lesions        LABS:                        12.4   10.5  )-----------( 213      ( 19 Sep 2017 13:48 )             36.2     09-19    126<L>  |  93<L>  |  15  ----------------------------<  96  4.2   |  25  |  0.53    Ca    9.2      19 Sep 2017 13:48    TPro  7.3  /  Alb  3.6  /  TBili  1.1  /  DBili  x   /  AST  21  /  ALT  20  /  AlkPhos  76  09-19          vanco through     RADIOLOGY & ADDITIONAL STUDIES:      CRITICAL CARE TIME SPENT:

## 2017-09-20 NOTE — CONSULT NOTE ADULT - PROBLEM SELECTOR RECOMMENDATION 3
-Patient currently on Full vent support  -titrate settings to maintain SaO2 >90%, or pH >7.25  -consider low titdal volume ventilation strategy w/ goal Tv 4-6 cc/kg of ideal body weight  -plateu pressure goal <30  -Peridex oral care and VAP prophylaxis with HOB 30 degrees   -aggressive chest PT and suctioning   -daily sedation vacation with spontaneous breathing trial if clinical condition warrants, discuss with respiratory therapy -Patient currently on Full vent support  -titrate settings to maintain SaO2 >90%, or pH >7.25  -consider low titdal volume ventilation strategy w/ goal Tv 4-6 cc/kg of ideal body weight  -plateu pressure goal <30  -Peridex oral care and VAP prophylaxis with HOB 30 degrees   -aggressive chest PT and suctioning   -daily sedation vacation with spontaneous breathing trial if clinical condition warrants, discuss with respiratory therapy  plan for extubation when patient is able to manage her own airway

## 2017-09-20 NOTE — CONSULT NOTE ADULT - PROBLEM SELECTOR RECOMMENDATION 2
hold sedative meds  plan to extubate in morning if metal stutious hold sedative meds  plan to extubate in morning if mental status improves

## 2017-09-20 NOTE — CONSULT NOTE ADULT - ASSESSMENT
Patient is a 89yo female with pmhx of anemia, constipation, diverticulitis, fall, GERD, hypothyroidism, osteoporosis, pelvic fracture presents with 3 day hx of right sided lower back pain, evaluated for cholecystitis   - ekg shows RBBB, patient denies any hx of chest pain, palpitations, sob, or other cardiac symptoms, patient can followup with outpatient cardio for possible further evaluation   - RUQ abdominal pain and right sided back pain likely due to cholecystitis, pending abdominal mri and plan per surgery.  - Pt has no hx of MI, active CAD, ADHF, or severe valvular disease, patient is optimized from a cardiovascular perspective for moderate risk procedure for cholecystitis  - monitor vitals closely  - all workup per primary team  - will follow 89yo female with pmhx of anemia, constipation, diverticulitis, fall, GERD, hypothyroidism, osteoporosis, pelvic fracture presents with 3 day hx of right sided lower back pain, with cholecystitis based on hida and mri      -there is no evidence of acute ischemia.  -there is no evidence of significant arrhythmia.  -there is no evidence for meaningful  volume overload.  -exam and EKG do not suggest decompensated heart disease or significant valvular disease  -she should be considered optimized from a cv perspective for her upcoming surgery.  Routine hemodynamic monitoring is recommended.  -DVT prophylaxis  -monitor electrolytes, keep k>4, Mg>2  - will follow

## 2017-09-20 NOTE — PROGRESS NOTE ADULT - SUBJECTIVE AND OBJECTIVE BOX
< from: MRI Abdomen w/o Cont (09.20.17 @ 10:05) >  EXAM:  MRI ABDOMEN W O CONTRAST                            PROCEDURE DATE:  09/20/2017          INTERPRETATION:  History: Right upper quadrant pain. Dilated common duct   on recent ultrasound.    MRCP, multisequence noncontrast abdominal MR. Prior abdominal MR dated   5/1/2014 and 1/12/2011.    Markedly distended gallbladder without stones or wall thickening and   there is a small amount of pericholecystic fluid with a similar   appearance to each of the 2 prior MR studies. Correlate clinicallyand   with HIDA scan if there is suspicion for acute cholecystitis. Common duct   measures 7 mm which is normal for the patient's chronologic age. There is   no common duct stone or stricture. No intrahepatic ductal dilatation.   Unenhanced liver spleen not remarkable. Diffusely atrophic pancreas. No   pancreatic ductal dilatation.  No adrenal nodules.  Stable right renal cyst.  Trace abdominal ascites.  Generalized distention large and small bowel compatible with ileus.    Impression:    Distended gallbladder with pericholecystic fluid. Correlate clinically   and with HIDA scan.  No significant biliary dilatation or common duct stone.    GABO AMAYA M.D., ATTENDING RADIOLOGIST    < end of copied text >      Acute cholecystitis, plan OR risks benefits alternative explained to patient, daughter and son Dr. Pascual Alanis (anesthesia at length), all questions answered.

## 2017-09-20 NOTE — CONSULT NOTE ADULT - ASSESSMENT
89 year old female POD# 0 s/p katy mohan is 89 year old female POD# 0 s/p lap marques who is remained lethargic post op with altered mental status and also experienced apnea while begin weaned on CPAP in PACU . PT accepted to ICU for overnight montiroing and extubation when ready. 89 year old female POD# 0 s/p lap marques who is remained lethargic post op with altered mental status and also experienced apnea while begin weaned on CPAP in PACU . PT accepted to ICU for overnight montiroing and extubation when ready.    Critical Care time: 34 mins assessing presenting problems of acute illness that poses high probability of life threatening deterioration or end organ damage/dysfunction.  Medical decision making inculding Initiating plan of care, reviewing data, reviewing radiology, discussing with multidisciplinary team, non inclusive of procedures, discussing goals of care with patient/family

## 2017-09-20 NOTE — CONSULT NOTE ADULT - SUBJECTIVE AND OBJECTIVE BOX
Patient is a 89y old  Female who presents with a chief complaint of abd pain (19 Sep 2017 16:45)    HPI:  89 yo white female with few days of positional low bilateral back pain. Seen here yesterday and had ct scan which revealed marled degenerative  changes and stool. Ultram was prescribed but patient then developed worsening nausea and then occasional vomiting. Feels weaker today. Poor oral intake. Mild right sided abdominal pains x few days. No frequency or dysuria.  In ER patient was found to have RUQ tender with + escamilla's sign.  Patient is being admitted for further work up and treatment. (19 Sep 2017 16:45)    Renal consult called for low sodium levels. + Nausea /vomiting. + Back pain      PAST MEDICAL HISTORY:  Diverticulitis  Pelvic fracture  Fall  Anemia  Constipation  Osteoporosis  Diverticulitis  GERD (gastroesophageal reflux disease)  Hypothyroid  Reflux  Bronchitis      PAST SURGICAL HISTORY:  S/P small bowel resection  Ovarian cyst  S/P appendectomy  Hip fracture requiring operative repair      FAMILY HISTORY:  No pertinent family history in first degree relatives      SOCIAL HISTORY: No smoking or alcohol use     Allergies    No Known Allergies      Home Medications:   * Incomplete Medication History as of 19-Sep-2017 12:54 documented in Structured Notes  · 	Ultram 50 mg oral tablet: 1 tab(s) orally every 6 hours, As Needed MDD:6  · 	Colace 100 mg oral capsule: 1 cap(s) orally 3 times a day, Last Dose Taken:    · 	MiraLax - oral powder for reconstitution:  orally   	hold for diarhea, Last Dose Taken:    · 	PreserVision Antioxidant Multiple Vitamins and Minerals oral capsule: 1 cap(s) orally 2 times a day, Last Dose Taken:    · 	Aspirin Enteric Coated 81 mg oral delayed release tablet: 1 tab(s) orally once a day, Last Dose Taken:    · 	folic acid 0.4 mg oral tablet: 1 tab(s) orally once a day, Last Dose Taken:    · 	omeprazole 20 mg oral delayed release capsule: 1 cap(s) orally 2 times a day, one in the morning and one at 1 PM, Last Dose Taken:    · 	Vitamin B Complex 100: 1 tab(s) orally once a day  · 	Vitamin D3 2000 intl units oral capsule: 1 cap(s) orally once a day  · 	Slow Fe (as elemental iron) 45 mg oral tablet, extended release: 1 tab(s) orally once a day  · 	Vitamin B12: 1 tab(s) orally once a day  · 	Calcium 600+D: 1 tab(s) orally once a day, Last Dose Taken:    · 	levothyroxine: 12.5 microgram(s) orally once a day, Last Dose Taken:        MEDICATIONS  (STANDING):  lidocaine   Patch 1 Patch Transdermal daily  pantoprazole  Injectable 40 milliGRAM(s) IV Push daily  influenza   Vaccine 0.5 milliLiter(s) IntraMuscular once  ciprofloxacin   IVPB      metroNIDAZOLE  IVPB      sodium chloride 0.9%. 1000 milliLiter(s) (50 mL/Hr) IV Continuous <Continuous>  ciprofloxacin   IVPB 200 milliGRAM(s) IV Intermittent every 12 hours  metroNIDAZOLE  IVPB 500 milliGRAM(s) IV Intermittent every 8 hours    MEDICATIONS  (PRN):  morphine  - Injectable 2 milliGRAM(s) IV Push every 6 hours PRN Severe Pain (7 - 10)  ondansetron Injectable 4 milliGRAM(s) IV Push every 6 hours PRN Nausea and/or Vomiting  HYDROmorphone  Injectable 0.5 milliGRAM(s) IV Push four times a day PRN Moderate Pain (4 - 6)      REVIEW OF SYSTEMS:  General: NAD  Respiratory: No cough, SOB  Cardiovascular: No CP or Palpitations	  Gastrointestinal: + nausea, Vomiting. No diarrhea  Genitourinary: No urinary complaints	  Musculoskeletal: + back pain      T(F): 97.8 (09-20-17 @ 04:40), Max: 99.2 (09-19-17 @ 22:42)  HR: 79 (09-20-17 @ 04:40) (65 - 79)  BP: 143/97 (09-20-17 @ 04:40) (126/72 - 176/80)  RR: 17 (09-20-17 @ 04:40) (16 - 17)  SpO2: 93% (09-20-17 @ 04:40) (90% - 98%)  Wt(kg): --    PHYSICAL EXAM:  General: NAD  Respiratory: b/l air entry  Cardiovascular: S1 S2  Gastrointestinal: soft  Extremities: Edema        09-19    126<L>  |  93<L>  |  15  ----------------------------<  96  4.2   |  25  |  0.53    Ca    9.2      19 Sep 2017 13:48    TPro  7.3  /  Alb  3.6  /  TBili  1.1  /  DBili  x   /  AST  21  /  ALT  20  /  AlkPhos  76  09-19                          12.4   10.5  )-----------( 213      ( 19 Sep 2017 13:48 )             36.2       Hemoglobin: 12.4 g/dL (09-19 @ 13:48)  Hematocrit: 36.2 % (09-19 @ 13:48)  Potassium, Serum: 4.2 mmol/L (09-19 @ 13:48)  Blood Urea Nitrogen, Serum: 15 mg/dL (09-19 @ 13:48)      Creatinine, Serum: 0.53 (09-19 @ 13:48)        LIVER FUNCTIONS - ( 19 Sep 2017 13:48 )  Alb: 3.6 g/dL / Pro: 7.3 g/dL / ALK PHOS: 76 U/L / ALT: 20 U/L / AST: 21 U/L / GGT: x                   < from: MRI Abdomen w/o Cont (09.20.17 @ 10:05) >  EXAM:  MRI ABDOMEN W O CONTRAST                            PROCEDURE DATE:  09/20/2017          INTERPRETATION:  History: Right upper quadrant pain. Dilated common duct   on recent ultrasound.    MRCP, multisequence noncontrast abdominal MR. Prior abdominal MR dated   5/1/2014 and 1/12/2011.    Markedly distended gallbladder without stones or wall thickening and   there is a small amount of pericholecystic fluid with a similar   appearance to each of the 2 prior MR studies. Correlate clinicallyand   with HIDA scan if there is suspicion for acute cholecystitis. Common duct   measures 7 mm which is normal for the patient's chronologic age. There is   no common duct stone or stricture. No intrahepatic ductal dilatation.   Unenhanced liver spleen not remarkable. Diffusely atrophic pancreas. No   pancreatic ductal dilatation.  No adrenal nodules.  Stable right renal cyst.  Trace abdominal ascites.  Generalized distention large and small bowel compatible with ileus.    Impression:    Distended gallbladder with pericholecystic fluid. Correlate clinically   and with HIDA scan.  No significant biliary dilatation or common duct stone.    < end of copied text >    < from: CT Abdomen and Pelvis No Cont (09.18.17 @ 04:38) >  EXAM:  CT ABDOMEN AND PELVIS                            PROCEDURE DATE:  09/18/2017          INTERPRETATION:  CLINICAL INFORMATION: Abdominal pain. Rule out abdominal   aortic aneurysm and lumbar spine compression fracture.    COMPARISON: CT abdomen/pelvis of 12/14/2014    PROCEDURE:   CT of the Abdomen and Pelvis was performed without intravenous contrast.   Intravenous contrast: None.  Oral contrast: None.  Sagittal and coronal reformats were performed.    FINDINGS:    LOWER CHEST: Within normal limits.    Evaluation of the solid abdominal organs is limited without the   administration of intravenous contrast material.    LIVER: Within normal limits.  BILE DUCTS: Normal caliber.  GALLBLADDER: Within normal limits.  SPLEEN: Within normal limits.  PANCREAS: Atrophy.  ADRENALS: Within normal limits.  KIDNEYS/URETERS: Punctate nonobstructing right upper pole renal calculus.   No hydronephrosis.    BLADDER: Within normal limits.  REPRODUCTIVE ORGANS: No pelvic mass.    BOWEL: No bowel obstruction. Sigmoid and scattered colonic diverticulosis   without diverticulitis. Normal appendix. Small bowel anastomosis   identified within the lower mid abdomen. Moderate amount of stool seen   throughout the colon. Duodenal diverticula diverticula fluid and air.  PERITONEUM: No ascites.  VESSELS:  Calcified atherosclerosis of the abdominal aorta, which is   nonaneurysmal. There is tortuosity of the upper abdominal aorta.  RETROPERITONEUM: No lymphadenopathy.    ABDOMINAL WALL: Diastases of the rectus musculature with a small   fat-containing lower abdominal wall incisional hernia.  BONES: Extensive multilevel degenerative changes of the spine. Stable   mild inferior endplate deformity of the L3 vertebral body. Minimal loss   of height involving the superior endplate of L1, new since 12/14/2014 no   acute fracture identified. Old healed fracture of the right superior and   inferior pubic rami. Status post open reduction internal fixation of the   right hip. No discrete periprosthetic fracture or lucency.    IMPRESSION:     No abdominal aortic aneurysm.    Multilevel degenerative changes of the spine. No CT evidence of acute   vertebral body fracture.    Moderate colonic fecal load, most prominent within the right hemiabdomen.    < end of copied text >

## 2017-09-20 NOTE — CONSULT NOTE ADULT - ASSESSMENT
Acute cholecystitis.    Recommend laparoscopic cholecystectomy.    Will follow Acute cholecystitis, positive HIDA, history of pancreatic lesions in the past.     Await MRI    Will follow

## 2017-09-20 NOTE — CONSULT NOTE ADULT - SUBJECTIVE AND OBJECTIVE BOX
Patient is a 89y old  Female who presents with a chief complaint of back pain and RUQ,RMQ abdominal pain which is worse with a deep breath.  Came to ER on  with severe back pain, readmitted via ER yesterday with continued pain.  Both of sudden onset.       PAST MEDICAL & SURGICAL HISTORY:  Diverticulitis  Pelvic fracture  Fall  Anemia  Constipation  Osteoporosis  Diverticulitis  GERD (gastroesophageal reflux disease)  Hypothyroid  Reflux  Bronchitis  S/P small bowel resection  Ovarian cyst: right  S/P appendectomy  Hip fracture requiring operative repair: right      MEDICATIONS  (STANDING):  lidocaine   Patch 1 Patch Transdermal daily  pantoprazole  Injectable 40 milliGRAM(s) IV Push daily  influenza   Vaccine 0.5 milliLiter(s) IntraMuscular once    MEDICATIONS  (PRN):  morphine  - Injectable 2 milliGRAM(s) IV Push every 6 hours PRN Severe Pain (7 - 10)  ondansetron Injectable 4 milliGRAM(s) IV Push every 6 hours PRN Nausea and/or Vomiting      No Known Allergies      SOCIAL HISTORY: ***  Tobacco Use-Denies  Alcohol Use-Denies  Occupation-Homemaker, 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    FAMILY HISTORY:  Father  88 stroke  Mother  84 complications of diabetes      Vital Signs Last 24 Hrs  T(C): 36.6 (20 Sep 2017 04:40), Max: 37.3 (19 Sep 2017 22:42)  T(F): 97.8 (20 Sep 2017 04:40), Max: 99.2 (19 Sep 2017 22:42)  HR: 79 (20 Sep 2017 04:40) (65 - 79)  BP: 143/97 (20 Sep 2017 04:40) (126/72 - 189/99)  BP(mean): --  RR: 17 (20 Sep 2017 04:40) (16 - 17)  SpO2: 93% (20 Sep 2017 04:40) (90% - 98%)    ROS  General: No acute distress, awake and alert  Head: Normal cephalic/atraumatic  Neck: Denies neck pain or palpable masses, hoarseness or stridor  Lymphatic: Denies cervical or axillary or femoral lymphadenopathy  Chest: No chest pain, cough or hemoptysis  Heart: No chest pain, palpitations  Abdomen: See above  Extremities: Denies cyanosis, open sores or swollen extremity  Neuro: Denies weakness, paralysis, syncope, loss of vision  Psych: Denies hallucinations, visual disturbances, or depression    PHYSICAL EXAM  General: No acute distress, appears comfortable, conversant, well nourished  Head, Eyes, Ears, Nose, Throat: Normal cephalic/atraumatic, CLIFF, EOMI, , anicteric, conjunctiva non injected and moist, vision grossly intact, hearing grossly intact, no nasal discharge, ears and nose symmetrical and atraumatic.  Nasal, oral, and oropharyngeal mucosa pink moist with no evidence of ulceration  Neck: Supple, carotids have good upstroke, trachea in the midline, without JVD or thyromegaly  Lymphatic: No evidence of masses or lymphadenopathy in the head, neck, trunk, axillary, inguinal, or supraclavicular regions  Chest: Lungs are clear to P&A, no wheezing, no rales, no ronchi, with good inspiratory effort  Heart: Heart rhythm regular, no murmurs  Extremity: No swelling, or open sores, no gross deformities,  good range of motion, no edema,  Josselyn sign  negative, no lymphadenopathy  Neuro: Alert and oriented x3, motor and sensory intact  Psychiatric: Awake , alert, oriented x3 with an appropriate affect.   Skin: Good color, turgor, texture with no gross lesions, no eruptions, no rashes, no subcutaneous nodules and normal temperature.     Abdomen: Soft, RUQ  tender, mild gaurding, good bowel sounds present in all four quadrants.  No  rebound, and no peritoneal signs.  No evidence of hepatosplenomegaly.  Incisional hernia at sight of the umbilicus.  Low midline incision.  Inguinal regions are unremarkable with no evidence of hernias.     LABS: ALL LABARTORY STUDIES WERE REVIEWED IN THEIR ENTIRETY                        12.4   10.5  )-----------( 213      ( 19 Sep 2017 13:48 )             36.2         126<L>  |  93<L>  |  15  ----------------------------<  96  4.2   |  25  |  0.53    Ca    9.2      19 Sep 2017 13:48    TPro  7.3  /  Alb  3.6  /  TBili  1.1  /  DBili  x   /  AST  21  /  ALT  20  /  AlkPhos  76          RADIOLOGY & ADDITIONAL STUDIES:  ALL RADIOLOGIC STUDIES WERE REVIEWED AND DISCUSSSED WITH THE RADIOLOGIST    < from: NM Hepatobiliary Scan w/wo Gall Bladder (17 @ 19:42) >  EXAM:  NM HEPATOBILIARY IMG                            PROCEDURE DATE:  2017          INTERPRETATION:  CLINICAL INFORMATION: 89-year-old female with right   upper quadrant abdominal pain, evaluate for acute cholecystitis.    RADIOPHARMACEUTICAL: 3 mCi Tc-99m-Mebrofenin, I.V.    TECHNIQUE:  Dynamic imaging of the anterior abdomen was performed for 2   hours after the injection of radiotracer followed by static images of the   abdomen in the anterior, right lateral and right anterior oblique   projections.      COMPARISON: No prior hepatobiliary scan available for comparison.    FINDINGS: There is prompt, homogeneous uptake of radiotracer by the   hepatocytes. Activity is first seen in the bowel at 20 minutes. The   gallbladder is not visualized at any time during the study. There is good   clearance of activity from the liver at the end of the study.    IMPRESSION: Abnormal hepatobiliary scan.    Findings consistent with acute cholecystitis, in the proper clinical   setting.    Dr.Isaac Carroll was notified of these results by telephone, on   2017, at about 7:45 PM, with read back.      < end of copied text >      < from: US Gallbladder (17 @ 15:14) >    EXAM:  US GALLBLADDER                            PROCEDURE DATE:  2017          INTERPRETATION:  Clinical information: Right upper quadrant pain    Exam type: Gallbladder sonography    Transverse and longitudinal images obtained.    No evidence of gallstones, gallbladder wall thickening, or   pericholecystic fluid.    Common duct measures slightly less than 8 mm, mildly dilated. Follow-up   with MRCP if clinically warranted.    IMPRESSION:    See above report.        STACEY CHAVARRIA M.D.,ATTENDING RADIOLOGIST  This document has been electronically signed. Sep 19 2017  3:23PM        < end of copied text >      < from: CT Abdomen and Pelvis No Cont (17 @ 04:38) >  EXAM:  CT ABDOMEN AND PELVIS                            PROCEDURE DATE:  2017          INTERPRETATION:  CLINICAL INFORMATION: Abdominal pain. Rule out abdominal   aortic aneurysm and lumbar spine compression fracture.    COMPARISON: CT abdomen/pelvis of 2014    PROCEDURE:   CT of the Abdomen and Pelvis was performed without intravenous contrast.   Intravenous contrast: None.  Oral contrast: None.  Sagittal and coronal reformats were performed.    FINDINGS:    LOWER CHEST: Within normal limits.    Evaluation of the solid abdominal organs is limited without the   administration of intravenous contrast material.    LIVER: Within normal limits.  BILE DUCTS: Normal caliber.  GALLBLADDER: Within normal limits.  SPLEEN: Within normal limits.  PANCREAS: Atrophy.  ADRENALS: Within normal limits.  KIDNEYS/URETERS: Punctate nonobstructing right upper pole renal calculus.   No hydronephrosis.    BLADDER: Within normal limits.  REPRODUCTIVE ORGANS: No pelvic mass.    BOWEL: No bowel obstruction. Sigmoid and scattered colonic diverticulosis   without diverticulitis. Normal appendix. Small bowel anastomosis   identified within the lower mid abdomen. Moderate amount of stool seen   throughout the colon. Duodenal diverticula diverticula fluid and air.  PERITONEUM: No ascites.  VESSELS:  Calcified atherosclerosis of the abdominal aorta, which is   nonaneurysmal. There is tortuosity of the upper abdominal aorta.  RETROPERITONEUM: No lymphadenopathy.    ABDOMINAL WALL: Diastases of the rectus musculature with a small   fat-containing lower abdominal wall incisional hernia.  BONES: Extensive multilevel degenerative changes of the spine. Stable   mild inferior endplate deformity of the L3 vertebral body. Minimal loss   of height involving the superior endplate of L1, new since 2014 no   acute fracture identified. Old healed fracture of the right superior and   inferior pubic rami. Status post open reduction internal fixation of the     < end of copied text > Patient is a 89y old  Female who presents with a chief complaint of back pain and RUQ,RMQ abdominal pain which is worse with a deep breath.  Came to ER on  with severe back pain, readmitted via ER yesterday with continued pain.  Both of sudden onset.       PAST MEDICAL & SURGICAL HISTORY:  Diverticulitis  Pelvic fracture  Fall  Anemia  Constipation  Osteoporosis  Diverticulitis  GERD (gastroesophageal reflux disease)  Hypothyroid  Reflux  Bronchitis  S/P small bowel resection secondary to diverticulitis   Ovarian cyst: right  S/P appendectomy  Hip fracture requiring operative repair: right      MEDICATIONS  (STANDING):  lidocaine   Patch 1 Patch Transdermal daily  pantoprazole  Injectable 40 milliGRAM(s) IV Push daily  influenza   Vaccine 0.5 milliLiter(s) IntraMuscular once    MEDICATIONS  (PRN):  morphine  - Injectable 2 milliGRAM(s) IV Push every 6 hours PRN Severe Pain (7 - 10)  ondansetron Injectable 4 milliGRAM(s) IV Push every 6 hours PRN Nausea and/or Vomiting      No Known Allergies      SOCIAL HISTORY: ***  Tobacco Use-Denies  Alcohol Use-Denies  Occupation-Homemaker, 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    FAMILY HISTORY:  Father  88 stroke  Mother  84 complications of diabetes      Vital Signs Last 24 Hrs  T(C): 36.6 (20 Sep 2017 04:40), Max: 37.3 (19 Sep 2017 22:42)  T(F): 97.8 (20 Sep 2017 04:40), Max: 99.2 (19 Sep 2017 22:42)  HR: 79 (20 Sep 2017 04:40) (65 - 79)  BP: 143/97 (20 Sep 2017 04:40) (126/72 - 189/99)  BP(mean): --  RR: 17 (20 Sep 2017 04:40) (16 - 17)  SpO2: 93% (20 Sep 2017 04:40) (90% - 98%)    ROS  General: No acute distress, awake and alert  Head: Normal cephalic/atraumatic  Neck: Denies neck pain or palpable masses, hoarseness or stridor  Lymphatic: Denies cervical or axillary or femoral lymphadenopathy  Chest: No chest pain, cough or hemoptysis  Heart: No chest pain, palpitations  Abdomen: See above  Extremities: Denies cyanosis, open sores or swollen extremity  Neuro: Denies weakness, paralysis, syncope, loss of vision  Psych: Denies hallucinations, visual disturbances, or depression    PHYSICAL EXAM  General: No acute distress, appears comfortable, conversant, well nourished  Head, Eyes, Ears, Nose, Throat: Normal cephalic/atraumatic, CLIFF, EOMI, , anicteric, conjunctiva non injected and moist, vision grossly intact, hearing grossly intact, no nasal discharge, ears and nose symmetrical and atraumatic.  Nasal, oral, and oropharyngeal mucosa pink moist with no evidence of ulceration  Neck: Supple, carotids have good upstroke, trachea in the midline, without JVD or thyromegaly  Lymphatic: No evidence of masses or lymphadenopathy in the head, neck, trunk, axillary, inguinal, or supraclavicular regions  Chest: Lungs are clear to P&A, no wheezing, no rales, no ronchi, with good inspiratory effort  Heart: Heart rhythm regular, no murmurs  Extremity: No swelling, or open sores, no gross deformities,  good range of motion, no edema,  Josselyn sign  negative, no lymphadenopathy  Neuro: Alert and oriented x3, motor and sensory intact  Psychiatric: Awake , alert, oriented x3 with an appropriate affect.   Skin: Good color, turgor, texture with no gross lesions, no eruptions, no rashes, no subcutaneous nodules and normal temperature.     Abdomen: Soft, RUQ  tender, mild gaurding, good bowel sounds present in all four quadrants.  No  rebound, and no peritoneal signs.  No evidence of hepatosplenomegaly.  Incisional hernia at sight of the umbilicus.  Low midline incision.  Inguinal regions are unremarkable with no evidence of hernias.     LABS: ALL LABARTORY STUDIES WERE REVIEWED IN THEIR ENTIRETY                        12.4   10.5  )-----------( 213      ( 19 Sep 2017 13:48 )             36.2         126<L>  |  93<L>  |  15  ----------------------------<  96  4.2   |  25  |  0.53    Ca    9.2      19 Sep 2017 13:48    TPro  7.3  /  Alb  3.6  /  TBili  1.1  /  DBili  x   /  AST  21  /  ALT  20  /  AlkPhos  76          RADIOLOGY & ADDITIONAL STUDIES:  ALL RADIOLOGIC STUDIES WERE REVIEWED AND DISCUSSSED WITH THE RADIOLOGIST    < from: NM Hepatobiliary Scan w/wo Gall Bladder (17 @ 19:42) >  EXAM:  NM HEPATOBILIARY IMG                            PROCEDURE DATE:  2017          INTERPRETATION:  CLINICAL INFORMATION: 89-year-old female with right   upper quadrant abdominal pain, evaluate for acute cholecystitis.    RADIOPHARMACEUTICAL: 3 mCi Tc-99m-Mebrofenin, I.V.    TECHNIQUE:  Dynamic imaging of the anterior abdomen was performed for 2   hours after the injection of radiotracer followed by static images of the   abdomen in the anterior, right lateral and right anterior oblique   projections.      COMPARISON: No prior hepatobiliary scan available for comparison.    FINDINGS: There is prompt, homogeneous uptake of radiotracer by the   hepatocytes. Activity is first seen in the bowel at 20 minutes. The   gallbladder is not visualized at any time during the study. There is good   clearance of activity from the liver at the end of the study.    IMPRESSION: Abnormal hepatobiliary scan.    Findings consistent with acute cholecystitis, in the proper clinical   setting.    Dr.Isaac Carroll was notified of these results by telephone, on   2017, at about 7:45 PM, with read back.      < end of copied text >      < from: US Gallbladder (17 @ 15:14) >    EXAM:  US GALLBLADDER                            PROCEDURE DATE:  2017          INTERPRETATION:  Clinical information: Right upper quadrant pain    Exam type: Gallbladder sonography    Transverse and longitudinal images obtained.    No evidence of gallstones, gallbladder wall thickening, or   pericholecystic fluid.    Common duct measures slightly less than 8 mm, mildly dilated. Follow-up   with MRCP if clinically warranted.    IMPRESSION:    See above report.        STACEY CHAVARRIA M.D.,ATTENDING RADIOLOGIST  This document has been electronically signed. Sep 19 2017  3:23PM        < end of copied text >      < from: CT Abdomen and Pelvis No Cont (17 @ 04:38) >  EXAM:  CT ABDOMEN AND PELVIS                            PROCEDURE DATE:  2017          INTERPRETATION:  CLINICAL INFORMATION: Abdominal pain. Rule out abdominal   aortic aneurysm and lumbar spine compression fracture.    COMPARISON: CT abdomen/pelvis of 2014    PROCEDURE:   CT of the Abdomen and Pelvis was performed without intravenous contrast.   Intravenous contrast: None.  Oral contrast: None.  Sagittal and coronal reformats were performed.    FINDINGS:    LOWER CHEST: Within normal limits.    Evaluation of the solid abdominal organs is limited without the   administration of intravenous contrast material.    LIVER: Within normal limits.  BILE DUCTS: Normal caliber.  GALLBLADDER: Within normal limits.  SPLEEN: Within normal limits.  PANCREAS: Atrophy.  ADRENALS: Within normal limits.  KIDNEYS/URETERS: Punctate nonobstructing right upper pole renal calculus.   No hydronephrosis.    BLADDER: Within normal limits.  REPRODUCTIVE ORGANS: No pelvic mass.    BOWEL: No bowel obstruction. Sigmoid and scattered colonic diverticulosis   without diverticulitis. Normal appendix. Small bowel anastomosis   identified within the lower mid abdomen. Moderate amount of stool seen   throughout the colon. Duodenal diverticula diverticula fluid and air.  PERITONEUM: No ascites.  VESSELS:  Calcified atherosclerosis of the abdominal aorta, which is   nonaneurysmal. There is tortuosity of the upper abdominal aorta.  RETROPERITONEUM: No lymphadenopathy.    ABDOMINAL WALL: Diastases of the rectus musculature with a small   fat-containing lower abdominal wall incisional hernia.  BONES: Extensive multilevel degenerative changes of the spine. Stable   mild inferior endplate deformity of the L3 vertebral body. Minimal loss   of height involving the superior endplate of L1, new since 2014 no   acute fracture identified. Old healed fracture of the right superior and   inferior pubic rami. Status post open reduction internal fixation of the     < end of copied text >

## 2017-09-20 NOTE — CONSULT NOTE ADULT - ASSESSMENT
·	Hyponatremia: Low solute intake, Increased ADH with nausea/vomiting  ·	Cholecystitis  ·	Hypertension    Pt currently NPO. On NS infusion. Check urine sodium, urine osm and serum uric acid level. Avoid hypotonic fluids. Monitor BP closely. Check serial sodium levels. Surgery evaluation in progress. Monitor BP trend. Titrate BP meds as needed. Will follow electrolytes and renal function trend. D/w pt's daughter at bedside. Further recommendations pending clinical course. Thank you for the courtesy of this referral.

## 2017-09-20 NOTE — PROGRESS NOTE ADULT - SUBJECTIVE AND OBJECTIVE BOX
Patient is a 89y old  Female who presents with a chief complaint of abd pain (19 Sep 2017 16:45)      INTERVAL /OVERNIGHT EVENTS: still c/o RUQ    MEDICATIONS  (STANDING):  lidocaine   Patch 1 Patch Transdermal daily  pantoprazole  Injectable 40 milliGRAM(s) IV Push daily  influenza   Vaccine 0.5 milliLiter(s) IntraMuscular once  ciprofloxacin   IVPB      metroNIDAZOLE  IVPB      sodium chloride 0.9%. 1000 milliLiter(s) (50 mL/Hr) IV Continuous <Continuous>  ciprofloxacin   IVPB 200 milliGRAM(s) IV Intermittent every 12 hours  metroNIDAZOLE  IVPB 500 milliGRAM(s) IV Intermittent every 8 hours    MEDICATIONS  (PRN):  morphine  - Injectable 2 milliGRAM(s) IV Push every 6 hours PRN Severe Pain (7 - 10)  ondansetron Injectable 4 milliGRAM(s) IV Push every 6 hours PRN Nausea and/or Vomiting  HYDROmorphone  Injectable 0.5 milliGRAM(s) IV Push four times a day PRN Moderate Pain (4 - 6)      Allergies    No Known Allergies    Intolerances        REVIEW OF SYSTEMS:  CONSTITUTIONAL: No fever, weight loss, or fatigue  EYES: No eye pain, visual disturbances, or discharge  ENMT:  No difficulty hearing, tinnitus, vertigo; No sinus or throat pain  NECK: No pain or stiffness  RESPIRATORY: No cough, wheezing, chills or hemoptysis; No shortness of breath  CARDIOVASCULAR: No chest pain, palpitations, dizziness, or leg swelling  GASTROINTESTINAL: No abdominal or epigastric pain. No nausea, vomiting, or hematemesis; No diarrhea or constipation. No melena or hematochezia.  GENITOURINARY: No dysuria, frequency, hematuria, or incontinence  NEUROLOGICAL: No headaches, memory loss, loss of strength, numbness, or tremors  SKIN: No itching, burning, rashes, or lesions   LYMPH NODES: No enlarged glands  ENDOCRINE: No heat or cold intolerance; No hair loss; No polydipsia or polyuria  MUSCULOSKELETAL: No joint pain or swelling; No muscle, back, or extremity pain  PSYCHIATRIC: No depression, anxiety, mood swings, or difficulty sleeping  HEME/LYMPH: No easy bruising, or bleeding gums  ALLERGY AND IMMUNOLOGIC: No hives or eczema    Vital Signs Last 24 Hrs  T(C): 36.6 (20 Sep 2017 04:40), Max: 37.3 (19 Sep 2017 22:42)  T(F): 97.8 (20 Sep 2017 04:40), Max: 99.2 (19 Sep 2017 22:42)  HR: 79 (20 Sep 2017 04:40) (65 - 79)  BP: 143/97 (20 Sep 2017 04:40) (126/72 - 176/80)  BP(mean): --  RR: 17 (20 Sep 2017 04:40) (16 - 17)  SpO2: 93% (20 Sep 2017 04:40) (90% - 98%)    PHYSICAL EXAM:  GENERAL: NAD, well-groomed, well-developed  HEAD:  Atraumatic, Normocephalic  EYES: EOMI, PERRLA, conjunctiva and sclera clear  ENMT: No tonsillar erythema, exudates, or enlargement; Moist mucous membranes, Good dentition, No lesions  NECK: Supple, No JVD, Normal thyroid  NERVOUS SYSTEM:  Alert & Oriented X3, Good concentration; Motor Strength 5/5 B/L upper and lower extremities; DTRs 2+ intact and symmetric  CHEST/LUNG: Clear to auscultation bilaterally; No rales, rhonchi, wheezing, or rubs  HEART: Regular rate and rhythm; No murmurs, rubs, or gallops  ABDOMEN: Soft, Nontender, Nondistended; Bowel sounds present  EXTREMITIES:  2+ Peripheral Pulses, No clubbing, cyanosis, or edema  LYMPH: No lymphadenopathy noted  SKIN: No rashes or lesions    LABS:                        12.4   10.5  )-----------( 213      ( 19 Sep 2017 13:48 )             36.2     19 Sep 2017 13:48    126    |  93     |  15     ----------------------------<  96     4.2     |  25     |  0.53     Ca    9.2        19 Sep 2017 13:48    TPro  7.3    /  Alb  3.6    /  TBili  1.1    /  DBili  x      /  AST  21     /  ALT  20     /  AlkPhos  76     19 Sep 2017 13:48        CAPILLARY BLOOD GLUCOSE          RADIOLOGY & ADDITIONAL TESTS:    Notes Reviewed:  [ x] YES  [ ] NO    Care Discussed with Consultants/Other Providers [x ] YES  [ ] NO

## 2017-09-20 NOTE — CONSULT NOTE ADULT - SUBJECTIVE AND OBJECTIVE BOX
[facility]         Deja Shipley, Lindsey, Jessica, Ankur, Brittani Lyle        [phone] (office)    CHIEF COMPLAINT: Patient is a 89y old  Female who presents with a chief complaint of abd pain (19 Sep 2017 16:45)      HPI: Patient is a 89yo female with pmhx of anemia, constipation, diverticulitis, fall, GERD, hypothyroidism, osteoporosis, pelvic fracture presents with 3 day hx of right sided lower back pain. Patient was initially seen in the ER for her back pain and discharged with ultram. Pain did not resolve at home and patient reported nausea and vomiting. Patient was brought back to the ER and found to have +escamilla's sign in the ED and cholecystitis on HIDA scan. Patient stated that her pain was sudden onset, sharp in nature and localized around the right lower back to right abdomen. Patient denies any cardiac hx; denies hx of MI, palpitations, chest pain, SOB, or BENNETT. Patient sees Dr. Henriquez for outpatient cardiology, but has not had an appointment for a long time. Cannot recall when her last echocardiogram was done but stated that it was normal. Denies any SOB when attending to her daily activities or ambulating around the house.     PAST MEDICAL & SURGICAL HISTORY:  Diverticulitis  Pelvic fracture  Fall  Anemia  Constipation  Osteoporosis  Diverticulitis  GERD (gastroesophageal reflux disease)  Hypothyroid  Reflux  Bronchitis  S/P small bowel resection  Ovarian cyst: right  S/P appendectomy  Hip fracture requiring operative repair: right      SOCIAL HISTORY: No active tobacco, alcohol or illicit drug use    FAMILY HISTORY:  No pertinent family history in first degree relatives      MEDICATIONS  (STANDING):  lidocaine   Patch 1 Patch Transdermal daily  pantoprazole  Injectable 40 milliGRAM(s) IV Push daily  influenza   Vaccine 0.5 milliLiter(s) IntraMuscular once  ciprofloxacin   IVPB      metroNIDAZOLE  IVPB      sodium chloride 0.9%. 1000 milliLiter(s) (50 mL/Hr) IV Continuous <Continuous>  ciprofloxacin   IVPB 200 milliGRAM(s) IV Intermittent once  ciprofloxacin   IVPB 200 milliGRAM(s) IV Intermittent every 12 hours  metroNIDAZOLE  IVPB 500 milliGRAM(s) IV Intermittent once  metroNIDAZOLE  IVPB 500 milliGRAM(s) IV Intermittent every 8 hours    MEDICATIONS  (PRN):  morphine  - Injectable 2 milliGRAM(s) IV Push every 6 hours PRN Severe Pain (7 - 10)  ondansetron Injectable 4 milliGRAM(s) IV Push every 6 hours PRN Nausea and/or Vomiting  HYDROmorphone  Injectable 0.5 milliGRAM(s) IV Push four times a day PRN Moderate Pain (4 - 6)      Allergies    No Known Allergies    Intolerances        REVIEW OF SYSTEMS:  General: Denies fever, chills, fatigue  HEENT: denies headache, visual changes, neck pain  Heart: denies chest pain, palpitations, LE edema  Lungs: denies SOB, BENNETT, or cough  GI: +RUQ abdominal pain, +Right sided back pain  Ext: denies any pain or swelling    Is negative for eye, ENT, GI, , allergic, dermatologic, musculoskeletal and neurologic, except as described above.    VITAL SIGNS:   Vital Signs Last 24 Hrs  T(C): 36.6 (20 Sep 2017 04:40), Max: 37.3 (19 Sep 2017 22:42)  T(F): 97.8 (20 Sep 2017 04:40), Max: 99.2 (19 Sep 2017 22:42)  HR: 79 (20 Sep 2017 04:40) (65 - 79)  BP: 143/97 (20 Sep 2017 04:40) (126/72 - 189/99)  BP(mean): --  RR: 17 (20 Sep 2017 04:40) (16 - 17)  SpO2: 93% (20 Sep 2017 04:40) (90% - 98%)    I&O's Summary      PHYSICAL EXAM:    Constitutional: NAD, awake and alert, well-developed  Eyes:  EOMI, no oral cyanosis, conjunctivae clear, anicteric.  Pulmonary: Non-labored, breath sounds are clear bilaterally, no wheezing, rales or rhonchi  Cardiovascular:  regular S1 and S2. No murmur.  No rubs, gallops or clicks  Gastrointestinal: Bowel Sounds present, soft, +RUQ tenderness with palpation or movement  Lymph: No peripheral edema.   Neurological: Alert, strength and sensitivity are grossly intact  Skin: No obvious lesions/rashes.   Psych:  Mood & affect appropriate .    LABS: All Labs Reviewed:                        12.4   10.5  )-----------( 213      ( 19 Sep 2017 13:48 )             36.2     19 Sep 2017 13:48    126    |  93     |  15     ----------------------------<  96     4.2     |  25     |  0.53     Ca    9.2        19 Sep 2017 13:48    TPro  7.3    /  Alb  3.6    /  TBili  1.1    /  DBili  x      /  AST  21     /  ALT  20     /  AlkPhos  76     19 Sep 2017 13:48      Blood Culture:     RADIOLOGY:  < from: CT Abdomen and Pelvis No Cont (09.18.17 @ 04:38) >  EXAM:  CT ABDOMEN AND PELVIS                            PROCEDURE DATE:  09/18/2017          INTERPRETATION:  CLINICAL INFORMATION: Abdominal pain. Rule out abdominal   aortic aneurysm and lumbar spine compression fracture.    COMPARISON: CT abdomen/pelvis of 12/14/2014    PROCEDURE:   CT of the Abdomen and Pelvis was performed without intravenous contrast.   Intravenous contrast: None.  Oral contrast: None.  Sagittal and coronal reformats were performed.    FINDINGS:    LOWER CHEST: Within normal limits.    Evaluation of the solid abdominal organs is limited without the   administration of intravenous contrast material.    LIVER: Within normal limits.  BILE DUCTS: Normal caliber.  GALLBLADDER: Within normal limits.  SPLEEN: Within normal limits.  PANCREAS: Atrophy.  ADRENALS: Within normal limits.  KIDNEYS/URETERS: Punctate nonobstructing right upper pole renal calculus.   No hydronephrosis.    BLADDER: Within normal limits.  REPRODUCTIVE ORGANS: No pelvic mass.    BOWEL: No bowel obstruction. Sigmoid and scattered colonic diverticulosis   without diverticulitis. Normal appendix. Small bowel anastomosis   identified within the lower mid abdomen. Moderate amount of stool seen   throughout the colon. Duodenal diverticula diverticula fluid and air.  PERITONEUM: No ascites.  VESSELS:  Calcified atherosclerosis of the abdominal aorta, which is   nonaneurysmal. There is tortuosity of the upper abdominal aorta.  RETROPERITONEUM: No lymphadenopathy.    ABDOMINAL WALL: Diastases of the rectus musculature with a small   fat-containing lower abdominal wall incisional hernia.  BONES: Extensive multilevel degenerative changes of the spine. Stable   mild inferior endplate deformity of the L3 vertebral body. Minimal loss   of height involving the superior endplate of L1, new since 12/14/2014 no   acute fracture identified. Old healed fracture of the right superior and   inferior pubic rami. Status post open reduction internal fixation of the   right hip. No discrete periprosthetic fracture or lucency.    IMPRESSION:     No abdominal aortic aneurysm.    Multilevel degenerative changes of the spine. No CT evidence of acute   vertebral body fracture.    Moderate colonic fecal load, most prominent within the right hemiabdomen.    PAOLO DAWN M.D., ATTENDING RADIOLOGIST  This document has been electronically signed. Sep 18 2017  4:59AM    < end of copied text >      EKG: Sinus rhythm [facility]         Deja Shipley, Lindsey, Jessica, Ankur, Valdo, Brittani        [phone] (office)    CHIEF COMPLAINT: Patient is a 89y old  Female who presents with a chief complaint of abd pain (19 Sep 2017 16:45)      HPI: Patient is a 89yo female with pmhx of anemia, constipation, diverticulitis, fall, GERD, hypothyroidism, osteoporosis, pelvic fracture presents with 3 day hx of right sided lower back pain. Patient was initially seen in the ER for her back pain and discharged with ultram. Pain did not resolve at home and patient reported nausea and vomiting. Patient was brought back to the ER and found to have +escamilla's sign in the ED and cholecystitis on HIDA scan. Patient stated that her pain was sudden onset, sharp in nature and localized around the right lower back to right abdomen. Patient denies any cardiac hx; denies hx of MI, palpitations, chest pain, SOB, or BENNETT. Patient in the past saw Dr. Henriquez for outpatient cardiology, but has not had an appointment for a long time. Cannot recall when her last echocardiogram was done but stated that it was normal. Denies any SOB when attending to her daily activities or ambulating around the house.     PAST MEDICAL & SURGICAL HISTORY:  Diverticulitis  Pelvic fracture  Fall  Anemia  Constipation  Osteoporosis  Diverticulitis  GERD (gastroesophageal reflux disease)  Hypothyroid  Reflux  Bronchitis  S/P small bowel resection  Ovarian cyst: right  S/P appendectomy  Hip fracture requiring operative repair: right      SOCIAL HISTORY: No active tobacco, alcohol or illicit drug use    FAMILY HISTORY:  No pertinent family history in first degree relatives      MEDICATIONS  (STANDING):  lidocaine   Patch 1 Patch Transdermal daily  pantoprazole  Injectable 40 milliGRAM(s) IV Push daily  influenza   Vaccine 0.5 milliLiter(s) IntraMuscular once  ciprofloxacin   IVPB      metroNIDAZOLE  IVPB      sodium chloride 0.9%. 1000 milliLiter(s) (50 mL/Hr) IV Continuous <Continuous>  ciprofloxacin   IVPB 200 milliGRAM(s) IV Intermittent once  ciprofloxacin   IVPB 200 milliGRAM(s) IV Intermittent every 12 hours  metroNIDAZOLE  IVPB 500 milliGRAM(s) IV Intermittent once  metroNIDAZOLE  IVPB 500 milliGRAM(s) IV Intermittent every 8 hours    MEDICATIONS  (PRN):  morphine  - Injectable 2 milliGRAM(s) IV Push every 6 hours PRN Severe Pain (7 - 10)  ondansetron Injectable 4 milliGRAM(s) IV Push every 6 hours PRN Nausea and/or Vomiting  HYDROmorphone  Injectable 0.5 milliGRAM(s) IV Push four times a day PRN Moderate Pain (4 - 6)      Allergies    No Known Allergies    Intolerances        REVIEW OF SYSTEMS:  General: Denies fever, chills, fatigue  HEENT: denies headache, visual changes, neck pain  Heart: denies chest pain, palpitations, LE edema  Lungs: denies SOB, BENNETT, or cough  GI: +RUQ abdominal pain, +Right sided back pain  Ext: denies any pain or swelling    Is negative for eye, ENT, GI, , allergic, dermatologic, musculoskeletal and neurologic, except as described above.    VITAL SIGNS:   Vital Signs Last 24 Hrs  T(C): 36.6 (20 Sep 2017 04:40), Max: 37.3 (19 Sep 2017 22:42)  T(F): 97.8 (20 Sep 2017 04:40), Max: 99.2 (19 Sep 2017 22:42)  HR: 79 (20 Sep 2017 04:40) (65 - 79)  BP: 143/97 (20 Sep 2017 04:40) (126/72 - 189/99)  BP(mean): --  RR: 17 (20 Sep 2017 04:40) (16 - 17)  SpO2: 93% (20 Sep 2017 04:40) (90% - 98%)    I&O's Summary      PHYSICAL EXAM:    Constitutional: NAD, awake and alert, well-developed  Eyes:  EOMI, no oral cyanosis, conjunctivae clear, anicteric.  Pulmonary: Non-labored, breath sounds are clear bilaterally, no wheezing, rales or rhonchi  Cardiovascular:  regular S1 and S2. 1/6 anteroapical murmur.  No rubs, gallops or clicks  Gastrointestinal: Bowel Sounds present, soft, +RUQ tenderness with palpation or movement. distended and tympanitic  Lymph: No peripheral edema.   Neurological: Alert, strength and sensitivity are grossly intact  Skin: No obvious lesions/rashes.   Psych:  Mood & affect appropriate .    LABS: All Labs Reviewed:                        12.4   10.5  )-----------( 213      ( 19 Sep 2017 13:48 )             36.2     19 Sep 2017 13:48    126    |  93     |  15     ----------------------------<  96     4.2     |  25     |  0.53     Ca    9.2        19 Sep 2017 13:48    TPro  7.3    /  Alb  3.6    /  TBili  1.1    /  DBili  x      /  AST  21     /  ALT  20     /  AlkPhos  76     19 Sep 2017 13:48      Blood Culture:     RADIOLOGY:  < from: CT Abdomen and Pelvis No Cont (09.18.17 @ 04:38) >  EXAM:  CT ABDOMEN AND PELVIS                            PROCEDURE DATE:  09/18/2017          INTERPRETATION:  CLINICAL INFORMATION: Abdominal pain. Rule out abdominal   aortic aneurysm and lumbar spine compression fracture.    COMPARISON: CT abdomen/pelvis of 12/14/2014    PROCEDURE:   CT of the Abdomen and Pelvis was performed without intravenous contrast.   Intravenous contrast: None.  Oral contrast: None.  Sagittal and coronal reformats were performed.    FINDINGS:    LOWER CHEST: Within normal limits.    Evaluation of the solid abdominal organs is limited without the   administration of intravenous contrast material.    LIVER: Within normal limits.  BILE DUCTS: Normal caliber.  GALLBLADDER: Within normal limits.  SPLEEN: Within normal limits.  PANCREAS: Atrophy.  ADRENALS: Within normal limits.  KIDNEYS/URETERS: Punctate nonobstructing right upper pole renal calculus.   No hydronephrosis.    BLADDER: Within normal limits.  REPRODUCTIVE ORGANS: No pelvic mass.    BOWEL: No bowel obstruction. Sigmoid and scattered colonic diverticulosis   without diverticulitis. Normal appendix. Small bowel anastomosis   identified within the lower mid abdomen. Moderate amount of stool seen   throughout the colon. Duodenal diverticula diverticula fluid and air.  PERITONEUM: No ascites.  VESSELS:  Calcified atherosclerosis of the abdominal aorta, which is   nonaneurysmal. There is tortuosity of the upper abdominal aorta.  RETROPERITONEUM: No lymphadenopathy.    ABDOMINAL WALL: Diastases of the rectus musculature with a small   fat-containing lower abdominal wall incisional hernia.  BONES: Extensive multilevel degenerative changes of the spine. Stable   mild inferior endplate deformity of the L3 vertebral body. Minimal loss   of height involving the superior endplate of L1, new since 12/14/2014 no   acute fracture identified. Old healed fracture of the right superior and   inferior pubic rami. Status post open reduction internal fixation of the   right hip. No discrete periprosthetic fracture or lucency.    IMPRESSION:     No abdominal aortic aneurysm.    Multilevel degenerative changes of the spine. No CT evidence of acute   vertebral body fracture.    Moderate colonic fecal load, most prominent within the right hemiabdomen.    PAOLO DAWN M.D., ATTENDING RADIOLOGIST  This document has been electronically signed. Sep 18 2017  4:59AM    < end of copied text >      EKG: Sinus rhythm

## 2017-09-21 LAB
ALBUMIN SERPL ELPH-MCNC: 2.9 G/DL — LOW (ref 3.3–5)
ALP SERPL-CCNC: 68 U/L — SIGNIFICANT CHANGE UP (ref 40–120)
ALT FLD-CCNC: 56 U/L — SIGNIFICANT CHANGE UP (ref 12–78)
ANION GAP SERPL CALC-SCNC: 17 MMOL/L — SIGNIFICANT CHANGE UP (ref 5–17)
AST SERPL-CCNC: 58 U/L — HIGH (ref 15–37)
BILIRUB SERPL-MCNC: 0.5 MG/DL — SIGNIFICANT CHANGE UP (ref 0.2–1.2)
BUN SERPL-MCNC: 13 MG/DL — SIGNIFICANT CHANGE UP (ref 7–23)
CALCIUM SERPL-MCNC: 8.6 MG/DL — SIGNIFICANT CHANGE UP (ref 8.5–10.1)
CHLORIDE SERPL-SCNC: 105 MMOL/L — SIGNIFICANT CHANGE UP (ref 96–108)
CO2 SERPL-SCNC: 15 MMOL/L — LOW (ref 22–31)
CREAT SERPL-MCNC: 0.5 MG/DL — SIGNIFICANT CHANGE UP (ref 0.5–1.3)
GLUCOSE SERPL-MCNC: 121 MG/DL — HIGH (ref 70–99)
HCT VFR BLD CALC: 32.9 % — LOW (ref 34.5–45)
HGB BLD-MCNC: 11.1 G/DL — LOW (ref 11.5–15.5)
MCHC RBC-ENTMCNC: 33 PG — SIGNIFICANT CHANGE UP (ref 27–34)
MCHC RBC-ENTMCNC: 33.8 GM/DL — SIGNIFICANT CHANGE UP (ref 32–36)
MCV RBC AUTO: 97.6 FL — SIGNIFICANT CHANGE UP (ref 80–100)
PLATELET # BLD AUTO: 198 K/UL — SIGNIFICANT CHANGE UP (ref 150–400)
POTASSIUM SERPL-MCNC: 4 MMOL/L — SIGNIFICANT CHANGE UP (ref 3.5–5.3)
POTASSIUM SERPL-SCNC: 4 MMOL/L — SIGNIFICANT CHANGE UP (ref 3.5–5.3)
PROT SERPL-MCNC: 6.7 G/DL — SIGNIFICANT CHANGE UP (ref 6–8.3)
RBC # BLD: 3.37 M/UL — LOW (ref 3.8–5.2)
RBC # FLD: 12.6 % — SIGNIFICANT CHANGE UP (ref 10.3–14.5)
SODIUM SERPL-SCNC: 137 MMOL/L — SIGNIFICANT CHANGE UP (ref 135–145)
WBC # BLD: 9.5 K/UL — SIGNIFICANT CHANGE UP (ref 3.8–10.5)
WBC # FLD AUTO: 9.5 K/UL — SIGNIFICANT CHANGE UP (ref 3.8–10.5)

## 2017-09-21 PROCEDURE — 99291 CRITICAL CARE FIRST HOUR: CPT

## 2017-09-21 PROCEDURE — 99233 SBSQ HOSP IP/OBS HIGH 50: CPT | Mod: GC

## 2017-09-21 RX ORDER — LEVOTHYROXINE SODIUM 125 MCG
12.5 TABLET ORAL DAILY
Qty: 0 | Refills: 0 | Status: DISCONTINUED | OUTPATIENT
Start: 2017-09-21 | End: 2017-09-26

## 2017-09-21 RX ORDER — ACETAMINOPHEN 500 MG
1000 TABLET ORAL ONCE
Qty: 0 | Refills: 0 | Status: COMPLETED | OUTPATIENT
Start: 2017-09-21 | End: 2017-09-22

## 2017-09-21 RX ORDER — HYDROMORPHONE HYDROCHLORIDE 2 MG/ML
0.25 INJECTION INTRAMUSCULAR; INTRAVENOUS; SUBCUTANEOUS EVERY 6 HOURS
Qty: 0 | Refills: 0 | Status: DISCONTINUED | OUTPATIENT
Start: 2017-09-21 | End: 2017-09-23

## 2017-09-21 RX ORDER — HYDROMORPHONE HYDROCHLORIDE 2 MG/ML
0.5 INJECTION INTRAMUSCULAR; INTRAVENOUS; SUBCUTANEOUS EVERY 6 HOURS
Qty: 0 | Refills: 0 | Status: DISCONTINUED | OUTPATIENT
Start: 2017-09-21 | End: 2017-09-23

## 2017-09-21 RX ADMIN — PANTOPRAZOLE SODIUM 40 MILLIGRAM(S): 20 TABLET, DELAYED RELEASE ORAL at 13:13

## 2017-09-21 RX ADMIN — LIDOCAINE 1 PATCH: 4 CREAM TOPICAL at 13:13

## 2017-09-21 RX ADMIN — Medication 1 TABLET(S): at 13:14

## 2017-09-21 RX ADMIN — HYDROMORPHONE HYDROCHLORIDE 0.5 MILLIGRAM(S): 2 INJECTION INTRAMUSCULAR; INTRAVENOUS; SUBCUTANEOUS at 22:05

## 2017-09-21 RX ADMIN — SODIUM CHLORIDE 75 MILLILITER(S): 9 INJECTION INTRAMUSCULAR; INTRAVENOUS; SUBCUTANEOUS at 04:40

## 2017-09-21 RX ADMIN — HEPARIN SODIUM 5000 UNIT(S): 5000 INJECTION INTRAVENOUS; SUBCUTANEOUS at 06:03

## 2017-09-21 RX ADMIN — Medication 81 MILLIGRAM(S): at 13:14

## 2017-09-21 RX ADMIN — HYDROMORPHONE HYDROCHLORIDE 0.5 MILLIGRAM(S): 2 INJECTION INTRAMUSCULAR; INTRAVENOUS; SUBCUTANEOUS at 10:42

## 2017-09-21 RX ADMIN — HEPARIN SODIUM 5000 UNIT(S): 5000 INJECTION INTRAVENOUS; SUBCUTANEOUS at 18:03

## 2017-09-21 RX ADMIN — HYDROMORPHONE HYDROCHLORIDE 0.5 MILLIGRAM(S): 2 INJECTION INTRAMUSCULAR; INTRAVENOUS; SUBCUTANEOUS at 06:06

## 2017-09-21 RX ADMIN — Medication 12.5 MICROGRAM(S): at 18:03

## 2017-09-21 RX ADMIN — HYDROMORPHONE HYDROCHLORIDE 0.5 MILLIGRAM(S): 2 INJECTION INTRAMUSCULAR; INTRAVENOUS; SUBCUTANEOUS at 11:32

## 2017-09-21 RX ADMIN — HYDROMORPHONE HYDROCHLORIDE 0.5 MILLIGRAM(S): 2 INJECTION INTRAMUSCULAR; INTRAVENOUS; SUBCUTANEOUS at 19:39

## 2017-09-21 RX ADMIN — HYDROMORPHONE HYDROCHLORIDE 0.5 MILLIGRAM(S): 2 INJECTION INTRAMUSCULAR; INTRAVENOUS; SUBCUTANEOUS at 04:36

## 2017-09-21 NOTE — PROGRESS NOTE ADULT - ASSESSMENT
Pt. well known to me with Acute cholecystitis, back pain following fall. Hx pancreatic lesion.  Stable postop Cholecystectomy.

## 2017-09-21 NOTE — PROGRESS NOTE ADULT - SUBJECTIVE AND OBJECTIVE BOX
DEPARTMENT OF ANESTHESIA  POST ANESTHETIC EVALUATION    The Patient was interviewed and evaluated    Vital Signs Last 24 Hrs  T(C): 37.4 (21 Sep 2017 12:01), Max: 37.9 (21 Sep 2017 04:30)  T(F): 99.4 (21 Sep 2017 12:01), Max: 100.2 (21 Sep 2017 04:30)  HR: 93 (21 Sep 2017 11:00) (54 - 97)  BP: 119/59 (21 Sep 2017 11:00) (84/52 - 177/93)  BP(mean): 80 (21 Sep 2017 11:00) (76 - 125)  RR: 26 (21 Sep 2017 11:00) (14 - 26)  SpO2: 100% (21 Sep 2017 11:00) (94% - 100%)    Evaluation:  The patient is now extubated.  Speaking without difficulty.  SaO2 was 99% on room air.  Crepitance has resolved.    (x ) No apparent complications or complaints regarding anesthesia care at this time  (x ) All questions were answered    Condition:  (x ) Stable      ( ) Guarded      ( ) Critical    Recommendations:  (x ) None     ( ) Other:

## 2017-09-21 NOTE — PROGRESS NOTE ADULT - SUBJECTIVE AND OBJECTIVE BOX
[facility] - Deja Shipley, Lindsey, Jessica, Ankur, Brittani Lyle  Office Number:  [phone]    Patient resting comfortably in bed in NAD.  Intubated. Pale. Breathing on own    ROS: negative unless otherwise mentioned.    Telemetry:    SR 80's,  no ectopy    MEDICATIONS  (STANDING):  influenza   Vaccine 0.5 milliLiter(s) IntraMuscular once  aspirin enteric coated 81 milliGRAM(s) Oral daily  heparin  Injectable 5000 Unit(s) SubCutaneous every 12 hours  lidocaine   Patch 1 Patch Transdermal daily  calcium carbonate  625 mG + Vitamin D (OsCal 250 + D) 1 Tablet(s) Oral daily  pantoprazole  Injectable 40 milliGRAM(s) IV Push daily  sodium chloride 0.9%. 1000 milliLiter(s) (75 mL/Hr) IV Continuous <Continuous>    MEDICATIONS  (PRN):  HYDROmorphone  Injectable 0.5 milliGRAM(s) IV Push every 6 hours PRN Moderate Pain (4 - 6)  morphine  - Injectable 2 milliGRAM(s) IV Push every 6 hours PRN Severe Pain (7 - 10)  ondansetron Injectable 4 milliGRAM(s) IV Push every 6 hours PRN Nausea and/or Vomiting  traMADol 50 milliGRAM(s) Oral every 4 hours PRN Mild Pain (1 - 3)      Allergies    No Known Allergies    Intolerances        Vital Signs Last 24 Hrs  T(C): 36.7 (21 Sep 2017 08:01), Max: 37.9 (21 Sep 2017 04:30)  T(F): 98.1 (21 Sep 2017 08:01), Max: 100.2 (21 Sep 2017 04:30)  HR: 72 (21 Sep 2017 07:00) (54 - 97)  BP: 140/64 (21 Sep 2017 07:00) (84/52 - 177/93)  BP(mean): 92 (21 Sep 2017 07:00) (76 - 125)  RR: 15 (21 Sep 2017 07:00) (14 - 24)  SpO2: 100% (21 Sep 2017 07:00) (94% - 100%)    I&O's Summary    20 Sep 2017 07:01  -  21 Sep 2017 07:00  --------------------------------------------------------  IN: 900 mL / OUT: 2 mL / NET: 898 mL        ON EXAM:    Constitutional: NAD, awake and alert, well-developed, intubated, off sedation  Eyes:  EOMI, no oral cyanosis, conjunctivae clear, anicteric.  Pulmonary: Non-labored, breath sounds are clear bilaterally, no wheezing, rales or rhonchi  Cardiovascular:  regular S1 and S2. 1/6 anteroapical murmur.  No rubs, gallops or clicks  Gastrointestinal: Bowel Sounds present, soft, +RUQ tenderness, mildly distended and tympanitic  Lymph: No peripheral edema.   Neurological: Alert, strength and sensitivity are grossly intact  Skin: No obvious lesions/rashes.   Psych:  Mood & affect appropriate .    LABS: All Labs Reviewed:                        11.1   9.5   )-----------( 198      ( 21 Sep 2017 06:09 )             32.9                         11.9   7.1   )-----------( 197      ( 20 Sep 2017 14:37 )             34.9                         12.4   10.5  )-----------( 213      ( 19 Sep 2017 13:48 )             36.2     21 Sep 2017 06:09    137    |  105    |  13     ----------------------------<  121    4.0     |  15     |  0.50   20 Sep 2017 14:20    138    |  102    |  12     ----------------------------<  67     3.4     |  22     |  0.64   19 Sep 2017 13:48    126    |  93     |  15     ----------------------------<  96     4.2     |  25     |  0.53     Ca    8.6        21 Sep 2017 06:09  Ca    9.2        20 Sep 2017 14:20  Ca    9.2        19 Sep 2017 13:48    TPro  6.7    /  Alb  2.9    /  TBili  0.5    /  DBili  x      /  AST  58     /  ALT  56     /  AlkPhos  68     21 Sep 2017 06:09  TPro  7.3    /  Alb  3.6    /  TBili  1.1    /  DBili  x      /  AST  21     /  ALT  20     /  AlkPhos  76     19 Sep 2017 13:48          Blood Culture:

## 2017-09-21 NOTE — PROGRESS NOTE ADULT - SUBJECTIVE AND OBJECTIVE BOX
Interval events:     Review of Systems:  Constitutional: no fever, chills, fatigue  Neuro: no headache, numbness, weakness  Resp: no cough, wheezing, shortness of breath  CVS: no chest pain, palpitations, leg swelling  GI: no abdominal pain, nausea, vomiting, diarrhea   : no dysuria, frequency, incontinence  Skin: no itching, burning, rashes, or lesions   Msk: no joint pain or swelling  Psych: no depression, anxiety    T(F): 98.1 (09-21-17 @ 08:01), Max: 100.2 (09-21-17 @ 04:30)  HR: 72 (09-21-17 @ 07:00) (54 - 97)  BP: 140/64 (09-21-17 @ 07:00) (84/52 - 177/93)  RR: 15 (09-21-17 @ 07:00) (14 - 24)  SpO2: 100% (09-21-17 @ 07:00) (94% - 100%)  Wt(kg): --    Mode: CPAP with PS, FiO2: 40, PEEP: 5, PS: 7    CAPILLARY BLOOD GLUCOSE        I&O's Summary    20 Sep 2017 07:01  -  21 Sep 2017 07:00  --------------------------------------------------------  IN: 900 mL / OUT: 2 mL / NET: 898 mL        Physical Exam:     Gen:  Neuro:  HEENT:  CV:  Pulm:  GI:  Ext:  Skin:    Meds:  aspirin enteric coated 81 milliGRAM(s) Oral daily  heparin  Injectable 5000 Unit(s) SubCutaneous every 12 hours            HYDROmorphone  Injectable 0.5 milliGRAM(s) IV Push every 6 hours PRN  morphine  - Injectable 2 milliGRAM(s) IV Push every 6 hours PRN  ondansetron Injectable 4 milliGRAM(s) IV Push every 6 hours PRN  traMADol 50 milliGRAM(s) Oral every 4 hours PRN    pantoprazole  Injectable 40 milliGRAM(s) IV Push daily        calcium carbonate  625 mG + Vitamin D (OsCal 250 + D) 1 Tablet(s) Oral daily  sodium chloride 0.9%. 1000 milliLiter(s) IV Continuous <Continuous>    influenza   Vaccine 0.5 milliLiter(s) IntraMuscular once    lidocaine   Patch 1 Patch Transdermal daily                                  11.1   9.5   )-----------( 198      ( 21 Sep 2017 06:09 )             32.9       09-21    137  |  105  |  13  ----------------------------<  121<H>  4.0   |  15<L>  |  0.50    Ca    8.6      21 Sep 2017 06:09    TPro  6.7  /  Alb  2.9<L>  /  TBili  0.5  /  DBili  x   /  AST  58<H>  /  ALT  56  /  AlkPhos  68  09-21                    ABG - ( 20 Sep 2017 21:38 )  pH: 7.26  /  pCO2: 39    /  pO2: 117   / HCO3: 17    / Base Excess: -9.1  /  SaO2: 97                  Radiology: ***    Bedside Lung U/S: ***    Bedside Cardiac U/S: ***    CENTRAL LINE: Y/N   DATE INSERTED:   REMOVE: Y/N    DESIR: Y/N      DATE INSERTED:        REMOVE: Y/N    A-LINE: Y/N     DATE INSERTED:              REMOVE: Y/N    GLOBAL ISSUE/BEST PRACTICE:  Analgesia:  Sedation:  HOB elevation: yes  Stress ulcer prophylaxis:  VTE prophylaxis:  Glycemic control:  Nutrition:    CODE STATUS: *** Interval events: Pt on CPAPPt extubated this morning, tolerating nasal canula, SpO2 >92%    Review of Systems:  Constitutional: no fever, chills, fatigue  Neuro: no headache, numbness, weakness  Resp: no cough, wheezing, shortness of breath  CVS: no chest pain, palpitations, leg swelling  GI: + abd pain, no nausea, vomiting, diarrhea   : no dysuria, frequency, incontinence  Skin: no itching, burning, rashes, or lesions   Msk: no joint pain or swelling  Psych: no depression, anxiety    T(F): 98.1 (09-21-17 @ 08:01), Max: 100.2 (09-21-17 @ 04:30)  HR: 72 (09-21-17 @ 07:00) (54 - 97)  BP: 140/64 (09-21-17 @ 07:00) (84/52 - 177/93)  RR: 15 (09-21-17 @ 07:00) (14 - 24)  SpO2: 100% (09-21-17 @ 07:00) (94% - 100%)       Mode: CPAP with PS, FiO2: 40, PEEP: 5, PS: 7    CAPILLARY BLOOD GLUCOSE        I&O's Summary    20 Sep 2017 07:01  -  21 Sep 2017 07:00  --------------------------------------------------------  IN: 900 mL / OUT: 2 mL / NET: 898 mL        Physical Exam:     Gen:  Neuro:  HEENT:  CV:  Pulm:  GI:  Ext:  Skin:    Meds:  aspirin enteric coated 81 milliGRAM(s) Oral daily  heparin  Injectable 5000 Unit(s) SubCutaneous every 12 hours            HYDROmorphone  Injectable 0.5 milliGRAM(s) IV Push every 6 hours PRN  morphine  - Injectable 2 milliGRAM(s) IV Push every 6 hours PRN  ondansetron Injectable 4 milliGRAM(s) IV Push every 6 hours PRN  traMADol 50 milliGRAM(s) Oral every 4 hours PRN    pantoprazole  Injectable 40 milliGRAM(s) IV Push daily        calcium carbonate  625 mG + Vitamin D (OsCal 250 + D) 1 Tablet(s) Oral daily  sodium chloride 0.9%. 1000 milliLiter(s) IV Continuous <Continuous>    influenza   Vaccine 0.5 milliLiter(s) IntraMuscular once    lidocaine   Patch 1 Patch Transdermal daily                                  11.1   9.5   )-----------( 198      ( 21 Sep 2017 06:09 )             32.9       09-21    137  |  105  |  13  ----------------------------<  121<H>  4.0   |  15<L>  |  0.50    Ca    8.6      21 Sep 2017 06:09    TPro  6.7  /  Alb  2.9<L>  /  TBili  0.5  /  DBili  x   /  AST  58<H>  /  ALT  56  /  AlkPhos  68  09-21                    ABG - ( 20 Sep 2017 21:38 )  pH: 7.26  /  pCO2: 39    /  pO2: 117   / HCO3: 17    / Base Excess: -9.1  /  SaO2: 97                  Radiology: ***    Bedside Lung U/S: ***    Bedside Cardiac U/S: ***    CENTRAL LINE: Y/N   DATE INSERTED:   REMOVE: Y/N    DESIR: Y/N      DATE INSERTED:        REMOVE: Y/N    A-LINE: Y/N     DATE INSERTED:              REMOVE: Y/N    GLOBAL ISSUE/BEST PRACTICE:  Analgesia:  Sedation:  HOB elevation: yes  Stress ulcer prophylaxis:  VTE prophylaxis:  Glycemic control:  Nutrition:    CODE STATUS: *** Interval events: Pt extubated this morning, tolerating nasal canula, SpO2 >92%    Review of Systems:  Constitutional: no fever, chills, fatigue  Neuro: no headache, numbness, weakness  Resp: no cough, wheezing, shortness of breath  CVS: no chest pain, palpitations, leg swelling  GI: + abd pain, no nausea, vomiting, diarrhea   : no dysuria, frequency, incontinence  Skin: no itching, burning, rashes, or lesions   Msk: no joint pain or swelling  Psych: no depression, anxiety    T(F): 98.1 (09-21-17 @ 08:01), Max: 100.2 (09-21-17 @ 04:30)  HR: 72 (09-21-17 @ 07:00) (54 - 97)  BP: 140/64 (09-21-17 @ 07:00) (84/52 - 177/93)  RR: 15 (09-21-17 @ 07:00) (14 - 24)  SpO2: 100% (09-21-17 @ 07:00) (94% - 100%)           CAPILLARY BLOOD GLUCOSE        I&O's Summary    20 Sep 2017 07:01  -  21 Sep 2017 07:00  --------------------------------------------------------  IN: 900 mL / OUT: 2 mL / NET: 898 mL        Physical Exam:     Gen: NAD, resting comfortably  Neuro: AAOx3, CN 2-12 intact  HEENT: PERRLA, MMM, NCAT  CV: RRR, s1 and s2, No M/R/R  Pulm: Coarse lung sounds R>L  GI: +NBS, soft, TTP 2/2 lap sandoval  Ext: warm, well perfused, 2/4 distal pulses      Meds:  aspirin enteric coated 81 milliGRAM(s) Oral daily  heparin  Injectable 5000 Unit(s) SubCutaneous every 12 hours            HYDROmorphone  Injectable 0.5 milliGRAM(s) IV Push every 6 hours PRN  morphine  - Injectable 2 milliGRAM(s) IV Push every 6 hours PRN  ondansetron Injectable 4 milliGRAM(s) IV Push every 6 hours PRN  traMADol 50 milliGRAM(s) Oral every 4 hours PRN    pantoprazole  Injectable 40 milliGRAM(s) IV Push daily        calcium carbonate  625 mG + Vitamin D (OsCal 250 + D) 1 Tablet(s) Oral daily  sodium chloride 0.9%. 1000 milliLiter(s) IV Continuous <Continuous>    influenza   Vaccine 0.5 milliLiter(s) IntraMuscular once    lidocaine   Patch 1 Patch Transdermal daily                                  11.1   9.5   )-----------( 198      ( 21 Sep 2017 06:09 )             32.9       09-21    137  |  105  |  13  ----------------------------<  121<H>  4.0   |  15<L>  |  0.50    Ca    8.6      21 Sep 2017 06:09    TPro  6.7  /  Alb  2.9<L>  /  TBili  0.5  /  DBili  x   /  AST  58<H>  /  ALT  56  /  AlkPhos  68  09-21                    ABG - ( 20 Sep 2017 21:38 )  pH: 7.26  /  pCO2: 39    /  pO2: 117   / HCO3: 17    / Base Excess: -9.1  /  SaO2: 97          Radiology: CXR showed bibasilar subsegmental atelectasis, and that ET tube at time was in place         CENTRAL LINE: N    DESIR:N    A-LINE: N    GLOBAL ISSUE/BEST PRACTICE:  Analgesia: dilaudid  Sedation: no  HOB elevation: yes  Stress ulcer prophylaxis: PPI  VTE prophylaxis: heparin  Glycemic control: n/a  Nutrition: regular diet    CODE STATUS: full Interval events: Pt extubated this morning, tolerating nasal canula, SpO2 >92%    Review of Systems:  Constitutional: no fever, chills, fatigue  Neuro: no headache, numbness, weakness  Resp: no cough, wheezing, shortness of breath  CVS: no chest pain, palpitations, leg swelling  GI: + abd pain, no nausea, vomiting, diarrhea   : no dysuria, frequency, incontinence  Skin: no itching, burning, rashes, or lesions   Msk: no joint pain or swelling  Psych: no depression, anxiety    T(F): 98.1 (09-21-17 @ 08:01), Max: 100.2 (09-21-17 @ 04:30)  HR: 72 (09-21-17 @ 07:00) (54 - 97)  BP: 140/64 (09-21-17 @ 07:00) (84/52 - 177/93)  RR: 15 (09-21-17 @ 07:00) (14 - 24)  SpO2: 100% (09-21-17 @ 07:00) (94% - 100%)       I&O's Summary    20 Sep 2017 07:01  -  21 Sep 2017 07:00  --------------------------------------------------------  IN: 900 mL / OUT: 2 mL / NET: 898 mL        Physical Exam:     Gen: NAD, resting comfortably  Neuro: AAOx3, CN 2-12 intact  HEENT: PERRLA, MMM, NCAT  CV: RRR, s1 and s2, No M/R/R  Pulm: Coarse lung sounds R>L  GI: +NBS, soft, laproscopic incisions dressed and c/d/i  Ext: warm, well perfused, 2/4 distal pulses      Meds:  aspirin enteric coated 81 milliGRAM(s) Oral daily  heparin  Injectable 5000 Unit(s) SubCutaneous every 12 hours      HYDROmorphone  Injectable 0.5 milliGRAM(s) IV Push every 6 hours PRN  morphine  - Injectable 2 milliGRAM(s) IV Push every 6 hours PRN  ondansetron Injectable 4 milliGRAM(s) IV Push every 6 hours PRN  traMADol 50 milliGRAM(s) Oral every 4 hours PRN    pantoprazole  Injectable 40 milliGRAM(s) IV Push daily        calcium carbonate  625 mG + Vitamin D (OsCal 250 + D) 1 Tablet(s) Oral daily  sodium chloride 0.9%. 1000 milliLiter(s) IV Continuous <Continuous>    influenza   Vaccine 0.5 milliLiter(s) IntraMuscular once    lidocaine   Patch 1 Patch Transdermal daily                                  11.1   9.5   )-----------( 198      ( 21 Sep 2017 06:09 )             32.9       09-21    137  |  105  |  13  ----------------------------<  121<H>  4.0   |  15<L>  |  0.50    Ca    8.6      21 Sep 2017 06:09    TPro  6.7  /  Alb  2.9<L>  /  TBili  0.5  /  DBili  x   /  AST  58<H>  /  ALT  56  /  AlkPhos  68  09-21      ABG - ( 20 Sep 2017 21:38 )  pH: 7.26  /  pCO2: 39    /  pO2: 117   / HCO3: 17    / Base Excess: -9.1  /  SaO2: 97          Radiology: CXR showed bibasilar subsegmental atelectasis, and that ET tube at time was in place         CENTRAL LINE: N    DESIR:N    A-LINE: N    GLOBAL ISSUE/BEST PRACTICE:  Analgesia: dilaudid  Sedation: no  HOB elevation: yes  Stress ulcer prophylaxis: PPI  VTE prophylaxis: heparin  Glycemic control: n/a  Nutrition: regular diet    CODE STATUS: full

## 2017-09-21 NOTE — DIETITIAN INITIAL EVALUATION ADULT. - OTHER INFO
Pt seen for length of stay on ICU. S/p laparoscopic cholecystectomy POD#1. Observed 50% intake at lunch. Daughter states pt has good appetite currently and denies GI distress at this time. Daughter states pt has lactose intolerance but can eat most dairy products and only consumes lactaid milk; daughter wishes not to add intolerance to medical record but rather provide only lactaid milk.

## 2017-09-21 NOTE — PROGRESS NOTE ADULT - ASSESSMENT
·	Hyponatremia: Low solute intake, Increased ADH with nausea/vomiting  ·	Cholecystitis, s/p lap sandoval.   ·	Hypertension    Improved sodium levels. Surgery follow up. Monitor BP trend. Titrate BP meds as needed. Will follow electrolytes and renal function trend. D/w pt's daughter at bedside.

## 2017-09-21 NOTE — PROGRESS NOTE ADULT - SUBJECTIVE AND OBJECTIVE BOX
ICU Progress Note    HPI:    S:    Pt seen and examined  HD # 3  Pt here for cholecystitis  Extubated this AM  PMHx anemia, constipation, diverticulitis, GERD, hypothyroidism, osteoporosis  s/p lap sandoval    Improved, waiting for floor bed    Allergies    No Known Allergies    Intolerances        MEDICATIONS  (STANDING):  influenza   Vaccine 0.5 milliLiter(s) IntraMuscular once  aspirin enteric coated 81 milliGRAM(s) Oral daily  heparin  Injectable 5000 Unit(s) SubCutaneous every 12 hours  lidocaine   Patch 1 Patch Transdermal daily  calcium carbonate  625 mG + Vitamin D (OsCal 250 + D) 1 Tablet(s) Oral daily  pantoprazole  Injectable 40 milliGRAM(s) IV Push daily  acetaminophen  IVPB. 1000 milliGRAM(s) IV Intermittent once  levothyroxine 12.5 MICROGram(s) Oral daily    MEDICATIONS  (PRN):  ondansetron Injectable 4 milliGRAM(s) IV Push every 6 hours PRN Nausea and/or Vomiting  HYDROmorphone  Injectable 0.5 milliGRAM(s) IV Push every 6 hours PRN Severe Pain (7 - 10)  HYDROmorphone  Injectable 0.25 milliGRAM(s) IV Push every 6 hours PRN Moderate Pain (4 - 6)      Drug Dosing Weight  Height (cm): 149.86 (20 Sep 2017 22:48)  Weight (kg): 55 (20 Sep 2017 22:48)  BMI (kg/m2): 24.5 (20 Sep 2017 22:48)  BSA (m2): 1.49 (20 Sep 2017 22:48)    PAST MEDICAL & SURGICAL HISTORY:  Diverticulitis  Pelvic fracture  Fall  Anemia  Constipation  Osteoporosis  Diverticulitis  GERD (gastroesophageal reflux disease)  Hypothyroid  Reflux  Bronchitis  S/P small bowel resection  Ovarian cyst: right  S/P appendectomy  Hip fracture requiring operative repair: right      FAMILY HISTORY:  No pertinent family history in first degree relatives      ROS: See HPI; otherwise, all systems reviewed and negative.    O:    ICU Vital Signs Last 24 Hrs  T(C): 36.8 (21 Sep 2017 16:00), Max: 37.9 (21 Sep 2017 04:30)  T(F): 98.3 (21 Sep 2017 16:00), Max: 100.2 (21 Sep 2017 04:30)  HR: 96 (21 Sep 2017 20:00) (67 - 104)  BP: 95/52 (21 Sep 2017 20:00) (95/52 - 177/93)  BP(mean): 67 (21 Sep 2017 20:00) (67 - 125)  ABP: --  ABP(mean): --  RR: 14 (21 Sep 2017 20:00) (14 - 31)  SpO2: 100% (21 Sep 2017 20:00) (83% - 100%)      ABG - ( 20 Sep 2017 21:38 )  pH: 7.26  /  pCO2: 39    /  pO2: 117   / HCO3: 17    / Base Excess: -9.1  /  SaO2: 97          I&O's Detail    20 Sep 2017 07:01  -  21 Sep 2017 07:00  --------------------------------------------------------  IN:    lactated ringers.: 300 mL    sodium chloride 0.9%: 600 mL  Total IN: 900 mL    OUT:    Incontinent per Diaper: 2 mL  Total OUT: 2 mL    Total NET: 898 mL      21 Sep 2017 07:01  -  21 Sep 2017 21:14  --------------------------------------------------------  IN:    Oral Fluid: 660 mL    sodium chloride 0.9%: 375 mL  Total IN: 1035 mL    OUT:    Incontinent per Diaper: 1 mL  Total OUT: 1 mL    Total NET: 1034 mL          Mode: CPAP with PS  FiO2: 40  PEEP: 5  PS: 7  MAP: 7.4      PE:    Constitutional: Healthy elderly F lying in bed in NAD.   Neck: No JVD, trachea midline.   Respiratory: CTA B/L good BS B/L no W/R/R.  Cardiovascular: S1S2+ RRR no M/R/G.  Gastrointestinal: Soft, NTND. Lap surgical scars noted.  Extremities: No peripheral edema, No cyanosis, clubbing.  Neurological: Awake, conversive, alert, no gross deficits.  Skin: No rashes, warm, moist.    LABS:    CBC Full  -  ( 21 Sep 2017 06:09 )  WBC Count : 9.5 K/uL  Hemoglobin : 11.1 g/dL  Hematocrit : 32.9 %  Platelet Count - Automated : 198 K/uL  Mean Cell Volume : 97.6 fl  Mean Cell Hemoglobin : 33.0 pg  Mean Cell Hemoglobin Concentration : 33.8 gm/dL  Auto Neutrophil # : x  Auto Lymphocyte # : x  Auto Monocyte # : x  Auto Eosinophil # : x  Auto Basophil # : x  Auto Neutrophil % : x  Auto Lymphocyte % : x  Auto Monocyte % : x  Auto Eosinophil % : x  Auto Basophil % : x    09-21    137  |  105  |  13  ----------------------------<  121<H>  4.0   |  15<L>  |  0.50    Ca    8.6      21 Sep 2017 06:09    TPro  6.7  /  Alb  2.9<L>  /  TBili  0.5  /  DBili  x   /  AST  58<H>  /  ALT  56  /  AlkPhos  68  09-21        CAPILLARY BLOOD GLUCOSE            LIVER FUNCTIONS - ( 21 Sep 2017 06:09 )  Alb: 2.9 g/dL / Pro: 6.7 g/dL / ALK PHOS: 68 U/L / ALT: 56 U/L / AST: 58 U/L / GGT: x

## 2017-09-21 NOTE — DIETITIAN INITIAL EVALUATION ADULT. - NS AS NUTRI INTERV ED CONTENT
Provided written and verbal Low Fat nutrition therapy education to daughter at bedside including which foods are high in fat, ways to reduce fat in diet, and the benefits of following a low fat diet following cholecystectomy.

## 2017-09-21 NOTE — PROGRESS NOTE ADULT - ASSESSMENT
89yo female with anemia, constipation, diverticulitis, fall, GERD, hypothyroidism, osteoporosis, pelvic fracture presents with 3 day hx of right sided lower back pain, with cholecystitis based on HIDA/MRI, s/p cholecystectomy.    1. Neuro: Maintain dilaudid 0.5 and 0.25 for severe and moderate pain management.     2. CV: Maintain home ASA regimen    3. Pulm: extubated and off ventilation, tolerating room air    4. GI: Advance diet as tolerated, fluid stopped. c/w PPI and zofran    5. Renal/Metabolic: no complaints    6. ID: no complaints    7. Heme: no complaints    8. Endo: Maintain home synthroid     9. Skin: no complaints    10. Dispo: full code, ready for transfer to floor. 89yo female with anemia, constipation, diverticulitis, fall, GERD, hypothyroidism, osteoporosis, pelvic fracture presents with 3 day hx of right sided lower back pain, with cholecystitis based on HIDA/MRI, s/p cholecystectomy.    1. Neuro: Maintain dilaudid 0.5 and 0.25 for severe and moderate pain management.     2. CV: Maintain home ASA regimen    3. Pulm: extubated and off ventilation, tolerating room air    4. GI: Advance diet as tolerated, fluid stopped. c/w PPI and zofran    5. Renal/Metabolic: no complaints    6. ID: no complaints    7. Heme: c/w heparin    8. Endo: Maintain home synthroid     9. Skin: no complaints    10. Dispo: full code, ready for transfer to floor.

## 2017-09-21 NOTE — PROGRESS NOTE ADULT - ASSESSMENT
89yo female with anemia, constipation, diverticulitis, fall, GERD, hypothyroidism, osteoporosis, pelvic fracture presents with 3 day hx of right sided lower back pain, with cholecystitis based on HIDA/MRI, s/p cholecystectomy.  - Remains intubated, but breathing comfortably during CPAP. Plan for extubation today  - Tolerated procedure well.  - there is no evidence of acute ischemia.  - there is no evidence of significant arrhythmia. No events on telemetry  - there is no evidence for meaningful  volume overload.  - exam and EKG do not suggest decompensated heart disease or significant valvular disease  - Continue with Aspirin 81 mg po daily  - DVT prophylaxis  - monitor electrolytes, keep k>4, Mg>2  - will follow

## 2017-09-21 NOTE — PROGRESS NOTE ADULT - ASSESSMENT
A:    89yFemale   # 3    Here for:    1. Acute cholecystitis s/p lap sandoval    P:    Cont diet  PRN analgesia  Daily ASA  PPI  Plan per surgery    Discharge planning

## 2017-09-21 NOTE — PROGRESS NOTE ADULT - SUBJECTIVE AND OBJECTIVE BOX
09-20-17 @ 07:01  -  09-21-17 @ 07:00  --------------------------------------------------------  IN: 900 mL / OUT: 2 mL / NET: 898 mL      T(C): 36.7 (09-21-17 @ 08:01), Max: 37.9 (09-21-17 @ 04:30)  HR: 72 (09-21-17 @ 07:00) (54 - 97)  BP: 140/64 (09-21-17 @ 07:00) (84/52 - 177/93)  RR: 15 (09-21-17 @ 07:00) (14 - 24)  SpO2: 100% (09-21-17 @ 07:00) (94% - 100%)  Post OP Day#1    Extubated  Incision clean dry intact.  Hasn't eaten yet    Lungs-clear  Heart-RRR, no murmurs.    Rec: Regular diet, incentive spirometer, ambulate.                                  11.1   9.5   )-----------( 198      ( 21 Sep 2017 06:09 )             32.9       137  |  105  |  13  ----------------------------<  121<H>  4.0   |  15<L>  |  0.50

## 2017-09-21 NOTE — DIETITIAN INITIAL EVALUATION ADULT. - ADHERENCE
Daughter states pt follows general healthful diet PTA. States pt typically takes B-complex vitamins, Iron, Calcium, and MVI supplements PTA./n/a

## 2017-09-21 NOTE — PROGRESS NOTE ADULT - SUBJECTIVE AND OBJECTIVE BOX
PULMONARY/CRITICAL CARE      INTERVAL HPI/OVERNIGHT EVENTS:  Extubated, doing well. No sob. Mild abdominal pain. No back pain.  89y FemaleHPI:  89 yo white female with few days of positional low bilateral back pain. Seen here yesterday and had ct scan which revealed marled degenerative  changes and stool. Ultram was prescribed but patient then developed worsening nausea and then occasional vomiting. Feels weaker today. Poor oral intake. Mild right sided abdominal pains x few days. No frequency or dysuria.  In ER patient was found to have RUQ tender with + escamilla's sign.  Patient is being admitted for further work up and treatment. (19 Sep 2017 16:45)        PAST MEDICAL & SURGICAL HISTORY:  Diverticulitis  Pelvic fracture  Fall  Anemia  Constipation  Osteoporosis  Diverticulitis  GERD (gastroesophageal reflux disease)  Hypothyroid  Reflux  Bronchitis  S/P small bowel resection  Ovarian cyst: right  S/P appendectomy  Hip fracture requiring operative repair: right        ICU Vital Signs Last 24 Hrs  T(C): 36.7 (21 Sep 2017 08:01), Max: 37.9 (21 Sep 2017 04:30)  T(F): 98.1 (21 Sep 2017 08:01), Max: 100.2 (21 Sep 2017 04:30)  HR: 72 (21 Sep 2017 07:00) (54 - 97)  BP: 140/64 (21 Sep 2017 07:00) (84/52 - 177/93)  BP(mean): 92 (21 Sep 2017 07:00) (76 - 125)  ABP: --  ABP(mean): --  RR: 15 (21 Sep 2017 07:00) (14 - 24)  SpO2: 100% (21 Sep 2017 07:00) (94% - 100%)    Qtts:     I&O's Summary    20 Sep 2017 07:01  -  21 Sep 2017 07:00  --------------------------------------------------------  IN: 900 mL / OUT: 2 mL / NET: 898 mL        Mode: CPAP with PS  FiO2: 40  PEEP: 5  PS: 7  MAP: 7.4      REVIEW OF SYSTEMS:    CONSTITUTIONAL: No fever, weight loss, or fatigue  EYES: No eye pain, visual disturbances, or discharge  ENMT:  No difficulty hearing, tinnitus, vertigo; No sinus or throat pain  NECK: No pain or stiffness  BREASTS: No pain, masses, or nipple discharge  RESPIRATORY: No cough, wheezing, chills or hemoptysis; No shortness of breath  CARDIOVASCULAR: No chest pain, palpitations, dizziness, or leg swelling  GASTROINTESTINAL:mild  abdominal  pain. No nausea, vomiting, or hematemesis; No diarrhea or constipation. No melena or hematochezia.  GENITOURINARY: No dysuria, frequency, hematuria, or incontinence  NEUROLOGICAL: No headaches, memory loss, loss of strength, numbness, or tremors  SKIN: No itching, burning, rashes, or lesions   LYMPH NODES: No enlarged glands  ENDOCRINE: No heat or cold intolerance; No hair loss  MUSCULOSKELETAL: No joint pain or swelling; No muscle, back, or extremity pain, no calf tenderness  PSYCHIATRIC: No depression, anxiety, mood swings, or difficulty sleeping  HEME/LYMPH: No easy bruising, or bleeding gums  ALLERGY AND IMMUNOLOGIC: No hives or eczema      PHYSICAL EXAM:    GENERAL: NAD, well-groomed, well-developed, NAD  HEAD:  Atraumatic, Normocephalic  EYES: EOMI, PERRLA, conjunctiva and sclera clear  ENMT: No tonsillar erythema, exudates, or enlargement; Moist mucous membranes, Good dentition, No lesions  NECK: Supple, No JVD, Normal thyroid  NERVOUS SYSTEM:  Alert & Oriented X3, Good concentration; Motor Strength 5/5 B/L upper and lower extremities  CHEST/LUNG: Clear to percussion bilaterally; No rales, rhonchi, wheezing, or rubs  HEART: Regular rate and rhythm; No murmurs, rubs, or gallops  ABDOMEN: Soft, Mildly tender, Nondistended; Bowel sounds present  EXTREMITIES:  2+ Peripheral Pulses, No clubbing, cyanosis, or edema  LYMPH: No lymphadenopathy noted  SKIN: No rashes or lesions        LABS:                        11.1   9.5   )-----------( 198      ( 21 Sep 2017 06:09 )             32.9     09-21    137  |  105  |  13  ----------------------------<  121<H>  4.0   |  15<L>  |  0.50    Ca    8.6      21 Sep 2017 06:09    TPro  6.7  /  Alb  2.9<L>  /  TBili  0.5  /  DBili  x   /  AST  58<H>  /  ALT  56  /  AlkPhos  68  09-21        ABG - ( 20 Sep 2017 21:38 )  pH: 7.26  /  pCO2: 39    /  pO2: 117   / HCO3: 17    / Base Excess: -9.1  /  SaO2: 97                vanco through     RADIOLOGY & ADDITIONAL STUDIES:      CRITICAL CARE TIME SPENT:

## 2017-09-21 NOTE — DIETITIAN INITIAL EVALUATION ADULT. - PT NOT SOURCE
Pt is documented as alert and oriented however remained asleep at time of interview. Daughter at bedside did not wish to wake pt.

## 2017-09-21 NOTE — PROGRESS NOTE ADULT - SUBJECTIVE AND OBJECTIVE BOX
Patient is a 89y old  Female who presents with a chief complaint of abd pain (19 Sep 2017 16:45)      INTERVAL /OVERNIGHT EVENTS: seen in ICU, abd pain improved    MEDICATIONS  (STANDING):  influenza   Vaccine 0.5 milliLiter(s) IntraMuscular once  aspirin enteric coated 81 milliGRAM(s) Oral daily  heparin  Injectable 5000 Unit(s) SubCutaneous every 12 hours  lidocaine   Patch 1 Patch Transdermal daily  calcium carbonate  625 mG + Vitamin D (OsCal 250 + D) 1 Tablet(s) Oral daily  pantoprazole  Injectable 40 milliGRAM(s) IV Push daily  acetaminophen  IVPB. 1000 milliGRAM(s) IV Intermittent once  levothyroxine 12.5 MICROGram(s) Oral daily    MEDICATIONS  (PRN):  ondansetron Injectable 4 milliGRAM(s) IV Push every 6 hours PRN Nausea and/or Vomiting  HYDROmorphone  Injectable 0.5 milliGRAM(s) IV Push every 6 hours PRN Severe Pain (7 - 10)  HYDROmorphone  Injectable 0.25 milliGRAM(s) IV Push every 6 hours PRN Moderate Pain (4 - 6)      Allergies    No Known Allergies    Intolerances        REVIEW OF SYSTEMS:  CONSTITUTIONAL: No fever, weight loss, or fatigue  EYES: No eye pain, visual disturbances, or discharge  ENMT:  No difficulty hearing, tinnitus, vertigo; No sinus or throat pain  NECK: No pain or stiffness  RESPIRATORY: No cough, wheezing, chills or hemoptysis; No shortness of breath  CARDIOVASCULAR: No chest pain, palpitations, dizziness, or leg swelling  GASTROINTESTINAL: No abdominal or epigastric pain. No nausea, vomiting, or hematemesis; No diarrhea or constipation. No melena or hematochezia.  GENITOURINARY: No dysuria, frequency, hematuria, or incontinence  NEUROLOGICAL: No headaches, memory loss, loss of strength, numbness, or tremors  SKIN: No itching, burning, rashes, or lesions   LYMPH NODES: No enlarged glands  ENDOCRINE: No heat or cold intolerance; No hair loss; No polydipsia or polyuria  MUSCULOSKELETAL: No joint pain or swelling; No muscle, back, or extremity pain  PSYCHIATRIC: No depression, anxiety, mood swings, or difficulty sleeping  HEME/LYMPH: No easy bruising, or bleeding gums  ALLERGY AND IMMUNOLOGIC: No hives or eczema    Vital Signs Last 24 Hrs  T(C): 36.8 (21 Sep 2017 16:00), Max: 37.9 (21 Sep 2017 04:30)  T(F): 98.3 (21 Sep 2017 16:00), Max: 100.2 (21 Sep 2017 04:30)  HR: 87 (21 Sep 2017 15:00) (54 - 97)  BP: 112/59 (21 Sep 2017 15:00) (84/52 - 177/93)  BP(mean): 80 (21 Sep 2017 15:00) (76 - 125)  RR: 14 (21 Sep 2017 15:00) (14 - 26)  SpO2: 100% (21 Sep 2017 15:00) (83% - 100%)    PHYSICAL EXAM:  GENERAL: NAD, well-groomed, well-developed  HEAD:  Atraumatic, Normocephalic  EYES: EOMI, PERRLA, conjunctiva and sclera clear  ENMT: No tonsillar erythema, exudates, or enlargement; Moist mucous membranes, Good dentition, No lesions  NECK: Supple, No JVD, Normal thyroid  NERVOUS SYSTEM:  Alert & Oriented X3, Good concentration; Motor Strength 5/5 B/L upper and lower extremities; DTRs 2+ intact and symmetric  CHEST/LUNG: Clear to auscultation bilaterally; No rales, rhonchi, wheezing, or rubs  HEART: Regular rate and rhythm; No murmurs, rubs, or gallops  ABDOMEN: Soft, Nontender, Nondistended; Bowel sounds present  EXTREMITIES:  2+ Peripheral Pulses, No clubbing, cyanosis, or edema  LYMPH: No lymphadenopathy noted  SKIN: No rashes or lesions    LABS:                        11.1   9.5   )-----------( 198      ( 21 Sep 2017 06:09 )             32.9     21 Sep 2017 06:09    137    |  105    |  13     ----------------------------<  121    4.0     |  15     |  0.50     Ca    8.6        21 Sep 2017 06:09    TPro  6.7    /  Alb  2.9    /  TBili  0.5    /  DBili  x      /  AST  58     /  ALT  56     /  AlkPhos  68     21 Sep 2017 06:09        CAPILLARY BLOOD GLUCOSE          RADIOLOGY & ADDITIONAL TESTS:    Notes Reviewed:  [x ] YES  [ ] NO    Care Discussed with Consultants/Other Providers [x ] YES  [ ] NO

## 2017-09-21 NOTE — DIETITIAN INITIAL EVALUATION ADULT. - ORAL INTAKE PTA
Daughter states pt had good appetite PTA however slightly decreased po intake x 2 days PTA secondary to nausea and abdominal pain./fair

## 2017-09-21 NOTE — PROGRESS NOTE ADULT - SUBJECTIVE AND OBJECTIVE BOX
Patient is a 89y old  Female who presents with a chief complaint of abd pain (19 Sep 2017 16:45)      Patient seen in follow up for hyponatremia. Pt seen in ICU s/p katy nick.     PAST MEDICAL HISTORY:  Diverticulitis  Pelvic fracture  Fall  Anemia  Constipation  Osteoporosis  Diverticulitis  GERD (gastroesophageal reflux disease)  Hypothyroid  Reflux  Bronchitis    MEDICATIONS  (STANDING):  influenza   Vaccine 0.5 milliLiter(s) IntraMuscular once  aspirin enteric coated 81 milliGRAM(s) Oral daily  heparin  Injectable 5000 Unit(s) SubCutaneous every 12 hours  lidocaine   Patch 1 Patch Transdermal daily  calcium carbonate  625 mG + Vitamin D (OsCal 250 + D) 1 Tablet(s) Oral daily  pantoprazole  Injectable 40 milliGRAM(s) IV Push daily  sodium chloride 0.9%. 1000 milliLiter(s) (75 mL/Hr) IV Continuous <Continuous>    MEDICATIONS  (PRN):  HYDROmorphone  Injectable 0.5 milliGRAM(s) IV Push every 6 hours PRN Moderate Pain (4 - 6)  morphine  - Injectable 2 milliGRAM(s) IV Push every 6 hours PRN Severe Pain (7 - 10)  ondansetron Injectable 4 milliGRAM(s) IV Push every 6 hours PRN Nausea and/or Vomiting  traMADol 50 milliGRAM(s) Oral every 4 hours PRN Mild Pain (1 - 3)    T(C): 36.7 (09-21-17 @ 08:01), Max: 37.9 (09-21-17 @ 04:30)  HR: 93 (09-21-17 @ 11:00) (54 - 97)  BP: 119/59 (09-21-17 @ 11:00) (84/52 - 177/93)  RR: 26 (09-21-17 @ 11:00) (14 - 26)  SpO2: 100% (09-21-17 @ 11:00) (93% - 100%)  Wt(kg): --  I&O's Detail    20 Sep 2017 07:01  -  21 Sep 2017 07:00  --------------------------------------------------------  IN:    lactated ringers.: 300 mL    sodium chloride 0.9%.: 600 mL  Total IN: 900 mL    OUT:    Incontinent per Diaper: 2 mL  Total OUT: 2 mL    Total NET: 898 mL      21 Sep 2017 07:01  -  21 Sep 2017 12:02  --------------------------------------------------------  IN:    Oral Fluid: 100 mL    sodium chloride 0.9%.: 375 mL  Total IN: 475 mL    OUT:    Incontinent per Diaper: 1 mL  Total OUT: 1 mL    Total NET: 474 mL          PHYSICAL EXAM:  General: NAD  Respiratory: b/l air entry  Cardiovascular: S1 S2  Gastrointestinal: soft, s/p surgery  Extremities:  no edema                          11.1   9.5   )-----------( 198      ( 21 Sep 2017 06:09 )             32.9     09-21    137  |  105  |  13  ----------------------------<  121<H>  4.0   |  15<L>  |  0.50    Ca    8.6      21 Sep 2017 06:09    TPro  6.7  /  Alb  2.9<L>  /  TBili  0.5  /  DBili  x   /  AST  58<H>  /  ALT  56  /  AlkPhos  68  09-21        LIVER FUNCTIONS - ( 21 Sep 2017 06:09 )  Alb: 2.9 g/dL / Pro: 6.7 g/dL / ALK PHOS: 68 U/L / ALT: 56 U/L / AST: 58 U/L / GGT: x             ABG - ( 20 Sep 2017 21:38 )  pH: 7.26  /  pCO2: 39    /  pO2: 117   / HCO3: 17    / Base Excess: -9.1  /  SaO2: 97                Sodium, Serum: 137 (09-21 @ 06:09)  Sodium, Serum: 138 (09-20 @ 14:20)  Sodium, Serum: 126 (09-19 @ 13:48)    Creatinine, Serum: 0.50 (09-21 @ 06:09)  Creatinine, Serum: 0.64 (09-20 @ 14:20)  Creatinine, Serum: 0.53 (09-19 @ 13:48)    Potassium, Serum: 4.0 (09-21 @ 06:09)  Potassium, Serum: 3.4 (09-20 @ 14:20)  Potassium, Serum: 4.2 (09-19 @ 13:48)    Hemoglobin: 11.1 (09-21 @ 06:09)  Hemoglobin: 11.9 (09-20 @ 14:37)  Hemoglobin: 12.4 (09-19 @ 13:48)

## 2017-09-22 LAB
ANION GAP SERPL CALC-SCNC: 9 MMOL/L — SIGNIFICANT CHANGE UP (ref 5–17)
BUN SERPL-MCNC: 14 MG/DL — SIGNIFICANT CHANGE UP (ref 7–23)
CALCIUM SERPL-MCNC: 9.4 MG/DL — SIGNIFICANT CHANGE UP (ref 8.5–10.1)
CHLORIDE SERPL-SCNC: 105 MMOL/L — SIGNIFICANT CHANGE UP (ref 96–108)
CO2 SERPL-SCNC: 24 MMOL/L — SIGNIFICANT CHANGE UP (ref 22–31)
CREAT SERPL-MCNC: 0.64 MG/DL — SIGNIFICANT CHANGE UP (ref 0.5–1.3)
GLUCOSE SERPL-MCNC: 115 MG/DL — HIGH (ref 70–99)
HCT VFR BLD CALC: 32.8 % — LOW (ref 34.5–45)
HGB BLD-MCNC: 11.2 G/DL — LOW (ref 11.5–15.5)
MAGNESIUM SERPL-MCNC: 1.8 MG/DL — SIGNIFICANT CHANGE UP (ref 1.6–2.6)
MCHC RBC-ENTMCNC: 32.5 PG — SIGNIFICANT CHANGE UP (ref 27–34)
MCHC RBC-ENTMCNC: 34.3 GM/DL — SIGNIFICANT CHANGE UP (ref 32–36)
MCV RBC AUTO: 94.9 FL — SIGNIFICANT CHANGE UP (ref 80–100)
PHOSPHATE SERPL-MCNC: 1.5 MG/DL — LOW (ref 2.5–4.5)
PLATELET # BLD AUTO: 201 K/UL — SIGNIFICANT CHANGE UP (ref 150–400)
POTASSIUM SERPL-MCNC: 3.8 MMOL/L — SIGNIFICANT CHANGE UP (ref 3.5–5.3)
POTASSIUM SERPL-SCNC: 3.8 MMOL/L — SIGNIFICANT CHANGE UP (ref 3.5–5.3)
RBC # BLD: 3.46 M/UL — LOW (ref 3.8–5.2)
RBC # FLD: 12.4 % — SIGNIFICANT CHANGE UP (ref 10.3–14.5)
SODIUM SERPL-SCNC: 138 MMOL/L — SIGNIFICANT CHANGE UP (ref 135–145)
SURGICAL PATHOLOGY FINAL REPORT - CH: SIGNIFICANT CHANGE UP
WBC # BLD: 9 K/UL — SIGNIFICANT CHANGE UP (ref 3.8–10.5)
WBC # FLD AUTO: 9 K/UL — SIGNIFICANT CHANGE UP (ref 3.8–10.5)

## 2017-09-22 PROCEDURE — 99233 SBSQ HOSP IP/OBS HIGH 50: CPT

## 2017-09-22 PROCEDURE — 72148 MRI LUMBAR SPINE W/O DYE: CPT | Mod: 26

## 2017-09-22 RX ORDER — PANTOPRAZOLE SODIUM 20 MG/1
40 TABLET, DELAYED RELEASE ORAL
Qty: 0 | Refills: 0 | Status: DISCONTINUED | OUTPATIENT
Start: 2017-09-22 | End: 2017-09-26

## 2017-09-22 RX ORDER — HEPARIN SODIUM 5000 [USP'U]/ML
5000 INJECTION INTRAVENOUS; SUBCUTANEOUS EVERY 8 HOURS
Qty: 0 | Refills: 0 | Status: DISCONTINUED | OUTPATIENT
Start: 2017-09-22 | End: 2017-09-26

## 2017-09-22 RX ORDER — TRAMADOL HYDROCHLORIDE 50 MG/1
25 TABLET ORAL
Qty: 0 | Refills: 0 | Status: DISCONTINUED | OUTPATIENT
Start: 2017-09-22 | End: 2017-09-26

## 2017-09-22 RX ORDER — KETOROLAC TROMETHAMINE 30 MG/ML
15 SYRINGE (ML) INJECTION EVERY 8 HOURS
Qty: 0 | Refills: 0 | Status: DISCONTINUED | OUTPATIENT
Start: 2017-09-22 | End: 2017-09-23

## 2017-09-22 RX ORDER — DOCUSATE SODIUM 100 MG
100 CAPSULE ORAL
Qty: 0 | Refills: 0 | Status: DISCONTINUED | OUTPATIENT
Start: 2017-09-22 | End: 2017-09-26

## 2017-09-22 RX ORDER — ACETAMINOPHEN 500 MG
650 TABLET ORAL EVERY 6 HOURS
Qty: 0 | Refills: 0 | Status: DISCONTINUED | OUTPATIENT
Start: 2017-09-22 | End: 2017-09-26

## 2017-09-22 RX ORDER — MAGNESIUM HYDROXIDE 400 MG/1
30 TABLET, CHEWABLE ORAL ONCE
Qty: 0 | Refills: 0 | Status: COMPLETED | OUTPATIENT
Start: 2017-09-22 | End: 2017-09-22

## 2017-09-22 RX ADMIN — Medication 1000 MILLIGRAM(S): at 10:42

## 2017-09-22 RX ADMIN — Medication 12.5 MICROGRAM(S): at 07:42

## 2017-09-22 RX ADMIN — Medication 24 MILLIGRAM(S): at 21:12

## 2017-09-22 RX ADMIN — MAGNESIUM HYDROXIDE 30 MILLILITER(S): 400 TABLET, CHEWABLE ORAL at 15:04

## 2017-09-22 RX ADMIN — Medication 15 MILLIGRAM(S): at 11:19

## 2017-09-22 RX ADMIN — HEPARIN SODIUM 5000 UNIT(S): 5000 INJECTION INTRAVENOUS; SUBCUTANEOUS at 22:16

## 2017-09-22 RX ADMIN — LIDOCAINE 1 PATCH: 4 CREAM TOPICAL at 22:32

## 2017-09-22 RX ADMIN — HYDROMORPHONE HYDROCHLORIDE 0.5 MILLIGRAM(S): 2 INJECTION INTRAMUSCULAR; INTRAVENOUS; SUBCUTANEOUS at 01:39

## 2017-09-22 RX ADMIN — LIDOCAINE 1 PATCH: 4 CREAM TOPICAL at 01:00

## 2017-09-22 RX ADMIN — Medication 650 MILLIGRAM(S): at 15:01

## 2017-09-22 RX ADMIN — Medication 650 MILLIGRAM(S): at 16:01

## 2017-09-22 RX ADMIN — HEPARIN SODIUM 5000 UNIT(S): 5000 INJECTION INTRAVENOUS; SUBCUTANEOUS at 15:00

## 2017-09-22 RX ADMIN — Medication 15 MILLIGRAM(S): at 19:19

## 2017-09-22 RX ADMIN — Medication 100 MILLIGRAM(S): at 22:17

## 2017-09-22 RX ADMIN — Medication 81 MILLIGRAM(S): at 15:01

## 2017-09-22 RX ADMIN — Medication 85 MILLIMOLE(S): at 18:45

## 2017-09-22 RX ADMIN — Medication 650 MILLIGRAM(S): at 21:11

## 2017-09-22 RX ADMIN — LIDOCAINE 1 PATCH: 4 CREAM TOPICAL at 10:27

## 2017-09-22 RX ADMIN — Medication 400 MILLIGRAM(S): at 10:27

## 2017-09-22 RX ADMIN — Medication 15 MILLIGRAM(S): at 12:40

## 2017-09-22 RX ADMIN — HEPARIN SODIUM 5000 UNIT(S): 5000 INJECTION INTRAVENOUS; SUBCUTANEOUS at 06:37

## 2017-09-22 RX ADMIN — HYDROMORPHONE HYDROCHLORIDE 0.5 MILLIGRAM(S): 2 INJECTION INTRAMUSCULAR; INTRAVENOUS; SUBCUTANEOUS at 01:54

## 2017-09-22 RX ADMIN — Medication 15 MILLIGRAM(S): at 19:45

## 2017-09-22 RX ADMIN — Medication 1 TABLET(S): at 15:01

## 2017-09-22 RX ADMIN — Medication 650 MILLIGRAM(S): at 21:48

## 2017-09-22 NOTE — PROGRESS NOTE ADULT - ASSESSMENT
·	Hyponatremia: Low solute intake, Increased ADH with nausea/vomiting  ·	Cholecystitis, s/p lap sandoval.   ·	Hypertension  ·	Hypophosphatemia    Improved and stable sodium levels. Surgery follow up. Phosphorous supps. Monitor BP trend. Titrate BP meds as needed. Will follow electrolytes and renal function trend. D/w pt's daughter at bedside.

## 2017-09-22 NOTE — PROGRESS NOTE ADULT - SUBJECTIVE AND OBJECTIVE BOX
Patient is a 89y old  Female who presents with a chief complaint of abd pain (19 Sep 2017 16:45)      INTERVAL /OVERNIGHT EVENTS:    MEDICATIONS  (STANDING):  influenza   Vaccine 0.5 milliLiter(s) IntraMuscular once  aspirin enteric coated 81 milliGRAM(s) Oral daily  lidocaine   Patch 1 Patch Transdermal daily  calcium carbonate  625 mG + Vitamin D (OsCal 250 + D) 1 Tablet(s) Oral daily  levothyroxine 12.5 MICROGram(s) Oral daily  heparin  Injectable 5000 Unit(s) SubCutaneous every 8 hours  pantoprazole    Tablet 40 milliGRAM(s) Oral before breakfast  sodium phosphate IVPB 15 milliMole(s) IV Intermittent once  magnesium hydroxide Suspension 30 milliLiter(s) Oral once    MEDICATIONS  (PRN):  ondansetron Injectable 4 milliGRAM(s) IV Push every 6 hours PRN Nausea and/or Vomiting  HYDROmorphone  Injectable 0.5 milliGRAM(s) IV Push every 6 hours PRN Severe Pain (7 - 10)  HYDROmorphone  Injectable 0.25 milliGRAM(s) IV Push every 6 hours PRN Moderate Pain (4 - 6)  ketorolac   Injectable 15 milliGRAM(s) IV Push every 8 hours PRN pain  acetaminophen   Tablet. 650 milliGRAM(s) Oral every 6 hours PRN Mild Pain (1 - 3)  traMADol 25 milliGRAM(s) Oral four times a day PRN Moderate Pain (4 - 6)      Allergies    No Known Allergies    Intolerances        REVIEW OF SYSTEMS:  CONSTITUTIONAL: No fever, weight loss, or fatigue  EYES: No eye pain, visual disturbances, or discharge  ENMT:  No difficulty hearing, tinnitus, vertigo; No sinus or throat pain  NECK: No pain or stiffness  RESPIRATORY: No cough, wheezing, chills or hemoptysis; No shortness of breath  CARDIOVASCULAR: No chest pain, palpitations, dizziness, or leg swelling  GASTROINTESTINAL: No abdominal or epigastric pain. No nausea, vomiting, or hematemesis; No diarrhea or constipation. No melena or hematochezia.  GENITOURINARY: No dysuria, frequency, hematuria, or incontinence  NEUROLOGICAL: No headaches, memory loss, loss of strength, numbness, or tremors  SKIN: No itching, burning, rashes, or lesions   LYMPH NODES: No enlarged glands  ENDOCRINE: No heat or cold intolerance; No hair loss; No polydipsia or polyuria  MUSCULOSKELETAL: No joint pain or swelling; No muscle, back, or extremity pain  PSYCHIATRIC: No depression, anxiety, mood swings, or difficulty sleeping  HEME/LYMPH: No easy bruising, or bleeding gums  ALLERGY AND IMMUNOLOGIC: No hives or eczema    Vital Signs Last 24 Hrs  T(C): 36.9 (22 Sep 2017 07:49), Max: 37.1 (22 Sep 2017 00:02)  T(F): 98.5 (22 Sep 2017 07:49), Max: 98.8 (22 Sep 2017 00:02)  HR: 54 (22 Sep 2017 07:49) (54 - 104)  BP: 119/76 (22 Sep 2017 07:49) (93/78 - 153/76)  BP(mean): 99 (21 Sep 2017 23:00) (67 - 105)  RR: 15 (22 Sep 2017 07:49) (12 - 39)  SpO2: 96% (22 Sep 2017 07:49) (83% - 100%)    PHYSICAL EXAM:  GENERAL: NAD, well-groomed, well-developed  HEAD:  Atraumatic, Normocephalic  EYES: EOMI, PERRLA, conjunctiva and sclera clear  ENMT: No tonsillar erythema, exudates, or enlargement; Moist mucous membranes, Good dentition, No lesions  NECK: Supple, No JVD, Normal thyroid  NERVOUS SYSTEM:  Alert & Oriented X3, Good concentration; Motor Strength 5/5 B/L upper and lower extremities; DTRs 2+ intact and symmetric  CHEST/LUNG: Clear to auscultation bilaterally; No rales, rhonchi, wheezing, or rubs  HEART: Regular rate and rhythm; No murmurs, rubs, or gallops  ABDOMEN: Soft, Nontender, Nondistended; Bowel sounds present  EXTREMITIES:  2+ Peripheral Pulses, No clubbing, cyanosis, or edema  LYMPH: No lymphadenopathy noted  SKIN: No rashes or lesions    LABS:                        11.2   9.0   )-----------( 201      ( 22 Sep 2017 07:14 )             32.8     22 Sep 2017 07:14    138    |  105    |  14     ----------------------------<  115    3.8     |  24     |  0.64     Ca    9.4        22 Sep 2017 07:14  Phos  1.5       22 Sep 2017 07:14  Mg     1.8       22 Sep 2017 07:14          CAPILLARY BLOOD GLUCOSE          RADIOLOGY & ADDITIONAL TESTS:    Notes Reviewed:  [x ] YES  [ ] NO    Care Discussed with Consultants/Other Providers [x ] YES  [ ] NO

## 2017-09-22 NOTE — PROGRESS NOTE ADULT - SUBJECTIVE AND OBJECTIVE BOX
Patient is a 89y old  Female who presents with a chief complaint of abd pain (19 Sep 2017 16:45)      Patient seen in follow up for hyponatremia. Improved and stable sodium levels. Family at bedside.     PAST MEDICAL HISTORY:  Diverticulitis  Pelvic fracture  Fall  Anemia  Constipation  Osteoporosis  Diverticulitis  GERD (gastroesophageal reflux disease)  Hypothyroid  Reflux  Bronchitis    MEDICATIONS  (STANDING):  influenza   Vaccine 0.5 milliLiter(s) IntraMuscular once  aspirin enteric coated 81 milliGRAM(s) Oral daily  lidocaine   Patch 1 Patch Transdermal daily  calcium carbonate  625 mG + Vitamin D (OsCal 250 + D) 1 Tablet(s) Oral daily  levothyroxine 12.5 MICROGram(s) Oral daily  heparin  Injectable 5000 Unit(s) SubCutaneous every 8 hours  pantoprazole    Tablet 40 milliGRAM(s) Oral before breakfast  sodium phosphate IVPB 15 milliMole(s) IV Intermittent once  magnesium hydroxide Suspension 30 milliLiter(s) Oral once    MEDICATIONS  (PRN):  ondansetron Injectable 4 milliGRAM(s) IV Push every 6 hours PRN Nausea and/or Vomiting  HYDROmorphone  Injectable 0.5 milliGRAM(s) IV Push every 6 hours PRN Severe Pain (7 - 10)  HYDROmorphone  Injectable 0.25 milliGRAM(s) IV Push every 6 hours PRN Moderate Pain (4 - 6)  ketorolac   Injectable 15 milliGRAM(s) IV Push every 8 hours PRN pain  acetaminophen   Tablet. 650 milliGRAM(s) Oral every 6 hours PRN Mild Pain (1 - 3)  traMADol 25 milliGRAM(s) Oral four times a day PRN Moderate Pain (4 - 6)    T(C): 36.9 (09-22-17 @ 07:49), Max: 37.9 (09-21-17 @ 04:30)  HR: 54 (09-22-17 @ 07:49) (54 - 104)  BP: 119/76 (09-22-17 @ 07:49) (84/52 - 177/93)  RR: 15 (09-22-17 @ 07:49)  SpO2: 96% (09-22-17 @ 07:49)  Wt(kg): --  I&O's Detail    21 Sep 2017 07:01  -  22 Sep 2017 07:00  --------------------------------------------------------  IN:    Oral Fluid: 660 mL    sodium chloride 0.9%: 375 mL  Total IN: 1035 mL    OUT:    Incontinent per Diaper: 1 mL    Voided: 200 mL  Total OUT: 201 mL    Total NET: 834 mL            PHYSICAL EXAM:  General: NAD  Respiratory: b/l air entry  Cardiovascular: S1 S2  Gastrointestinal: soft, s/p surgery  Extremities:  no edema       LABORATORY:                        11.2   9.0   )-----------( 201      ( 22 Sep 2017 07:14 )             32.8     09-22    138  |  105  |  14  ----------------------------<  115<H>  3.8   |  24  |  0.64    Ca    9.4      22 Sep 2017 07:14  Phos  1.5     09-22  Mg     1.8     09-22    TPro  6.7  /  Alb  2.9<L>  /  TBili  0.5  /  DBili  x   /  AST  58<H>  /  ALT  56  /  AlkPhos  68  09-21    Sodium, Serum: 138 mmol/L (09-22 @ 07:14)  Sodium, Serum: 137 mmol/L (09-21 @ 06:09)  Sodium, Serum: 138 mmol/L (09-20 @ 14:20)    Potassium, Serum: 3.8 mmol/L (09-22 @ 07:14)  Potassium, Serum: 4.0 mmol/L (09-21 @ 06:09)  Potassium, Serum: 3.4 mmol/L (09-20 @ 14:20)    Hemoglobin: 11.2 g/dL (09-22 @ 07:14)  Hemoglobin: 11.1 g/dL (09-21 @ 06:09)  Hemoglobin: 11.9 g/dL (09-20 @ 14:37)  Hemoglobin: 12.4 g/dL (09-19 @ 13:48)    Creatinine, Serum 0.64 (09-22 @ 07:14)  Creatinine, Serum 0.50 (09-21 @ 06:09)  Creatinine, Serum 0.64 (09-20 @ 14:20)  Creatinine, Serum 0.53 (09-19 @ 13:48)        LIVER FUNCTIONS - ( 21 Sep 2017 06:09 )  Alb: 2.9 g/dL / Pro: 6.7 g/dL / ALK PHOS: 68 U/L / ALT: 56 U/L / AST: 58 U/L / GGT: x             ABG - ( 20 Sep 2017 21:38 )  pH: 7.26  /  pCO2: 39    /  pO2: 117   / HCO3: 17    / Base Excess: -9.1  /  SaO2: 97        Phosphorus Level, Serum in AM (09.22.17 @ 07:14)    Phosphorus Level, Serum: 1.5 mg/dL

## 2017-09-22 NOTE — PROGRESS NOTE ADULT - ASSESSMENT
Surgically stable.    Needs to follow up in the office.    Will sign off case for now if reevaluation needed please notify.    MOM ordered for constipation.

## 2017-09-22 NOTE — PHYSICAL THERAPY INITIAL EVALUATION ADULT - PERTINENT HX OF CURRENT PROBLEM, REHAB EVAL
89 yo white female with few days of positional low bilateral back pain. Seen here yesterday and had ct scan which revealed marled degenerative  changes and stool. Ultram was prescribed but patient then developed worsening nausea and then occasional vomiting. Feels weaker today.

## 2017-09-22 NOTE — PROGRESS NOTE ADULT - SUBJECTIVE AND OBJECTIVE BOX
PULMONARY/CRITICAL CARE      INTERVAL HPI/OVERNIGHT EVENTS: Pt. stable, some back pain. Mild abdominal pain.     89y FemaleHPI:  89 yo white female with few days of positional low bilateral back pain. Seen here yesterday and had ct scan which revealed marled degenerative  changes and stool. Ultram was prescribed but patient then developed worsening nausea and then occasional vomiting. Feels weaker today. Poor oral intake. Mild right sided abdominal pains x few days. No frequency or dysuria.  In ER patient was found to have RUQ tender with + escamilla's sign.  Patient is being admitted for further work up and treatment. (19 Sep 2017 16:45)        PAST MEDICAL & SURGICAL HISTORY:  Diverticulitis  Pelvic fracture  Fall  Anemia  Constipation  Osteoporosis  Diverticulitis  GERD (gastroesophageal reflux disease)  Hypothyroid  Reflux  Bronchitis  S/P small bowel resection  Ovarian cyst: right  S/P appendectomy  Hip fracture requiring operative repair: right        ICU Vital Signs Last 24 Hrs  T(C): 36.9 (22 Sep 2017 07:49), Max: 37.4 (21 Sep 2017 12:01)  T(F): 98.5 (22 Sep 2017 07:49), Max: 99.4 (21 Sep 2017 12:01)  HR: 54 (22 Sep 2017 07:49) (54 - 104)  BP: 119/76 (22 Sep 2017 07:49) (93/78 - 153/76)  BP(mean): 99 (21 Sep 2017 23:00) (67 - 105)  ABP: --  ABP(mean): --  RR: 15 (22 Sep 2017 07:49) (12 - 39)  SpO2: 96% (22 Sep 2017 07:49) (83% - 100%)    Qtts:     I&O's Summary    21 Sep 2017 07:01  -  22 Sep 2017 07:00  --------------------------------------------------------  IN: 1035 mL / OUT: 201 mL / NET: 834 mL            REVIEW OF SYSTEMS:    CONSTITUTIONAL: No fever, weight loss, or fatigue  EYES: No eye pain, visual disturbances, or discharge  ENMT:  No difficulty hearing, tinnitus, vertigo; No sinus or throat pain  NECK: No pain or stiffness  BREASTS: No pain, masses, or nipple discharge  RESPIRATORY: No cough, wheezing, chills or hemoptysis; No shortness of breath  CARDIOVASCULAR: No chest pain, palpitations, dizziness, or leg swelling  GASTROINTESTINAL: Mild  abdominal  pain. No nausea, vomiting, or hematemesis; No diarrhea or constipation. No melena or hematochezia.  GENITOURINARY: No dysuria, frequency, hematuria, or incontinence  NEUROLOGICAL: No headaches, memory loss, loss of strength, numbness, or tremors  SKIN: No itching, burning, rashes, or lesions   LYMPH NODES: No enlarged glands  ENDOCRINE: No heat or cold intolerance; No hair loss  MUSCULOSKELETAL: No joint pain or swelling; No muscle,or extremity pain, no calf tenderness  Has low back pain.  PSYCHIATRIC: No depression, anxiety, mood swings, or difficulty sleeping  HEME/LYMPH: No easy bruising, or bleeding gums  ALLERGY AND IMMUNOLOGIC: No hives or eczema      PHYSICAL EXAM:    GENERAL: NAD, well-groomed, well-developed, NAD  HEAD:  Atraumatic, Normocephalic  EYES: EOMI, PERRLA, conjunctiva and sclera clear  ENMT: No tonsillar erythema, exudates, or enlargement; Moist mucous membranes, Good dentition, No lesions  NECK: Supple, No JVD, Normal thyroid  NERVOUS SYSTEM:  Alert & Oriented X3, Good concentration; Motor Strength 5/5 B/L upper and lower extremities  CHEST/LUNG: Clear to percussion bilaterally; No rales, rhonchi, wheezing, or rubs  HEART: Regular rate and rhythm; No murmurs, rubs, or gallops  ABDOMEN: Soft, Mildly tender, Nondistended; Bowel sounds present  EXTREMITIES:  2+ Peripheral Pulses, No clubbing, cyanosis, or edema  LYMPH: No lymphadenopathy noted  SKIN: No rashes or lesions        LABS:                        11.2   9.0   )-----------( 201      ( 22 Sep 2017 07:14 )             32.8     09-22    138  |  105  |  14  ----------------------------<  115<H>  3.8   |  24  |  0.64    Ca    9.4      22 Sep 2017 07:14  Phos  1.5     09-22  Mg     1.8     09-22    TPro  6.7  /  Alb  2.9<L>  /  TBili  0.5  /  DBili  x   /  AST  58<H>  /  ALT  56  /  AlkPhos  68  09-21        ABG - ( 20 Sep 2017 21:38 )  pH: 7.26  /  pCO2: 39    /  pO2: 117   / HCO3: 17    / Base Excess: -9.1  /  SaO2: 97                    RADIOLOGY & ADDITIONAL STUDIES:      CRITICAL CARE TIME SPENT:

## 2017-09-22 NOTE — PHYSICAL THERAPY INITIAL EVALUATION ADULT - ADDITIONAL COMMENTS
Patient lives in a home with 4 steps to enter and 6 steps to bedroom. Patient would use a rolling walker at night, as needed. Ambulates around the house with no AD. Lives in a split level home with 4 levels.  Patient uses the first 2 levels of the home. bathroom and bedroom is on the 2nd level. + rails.

## 2017-09-22 NOTE — PROGRESS NOTE ADULT - SUBJECTIVE AND OBJECTIVE BOX
[facility] - Deja Shipley, Lindsey, Jessica, Ankur, Brittani Lyle  Office Number:  [phone]    patient seen and examined at bedside, sitting comfortably, breathing on room air. complains of constipation and mild abdominal pain.    ROS: negative unless otherwise mentioned.    MEDICATIONS  (STANDING):  influenza   Vaccine 0.5 milliLiter(s) IntraMuscular once  aspirin enteric coated 81 milliGRAM(s) Oral daily  lidocaine   Patch 1 Patch Transdermal daily  calcium carbonate  625 mG + Vitamin D (OsCal 250 + D) 1 Tablet(s) Oral daily  levothyroxine 12.5 MICROGram(s) Oral daily  heparin  Injectable 5000 Unit(s) SubCutaneous every 8 hours  pantoprazole    Tablet 40 milliGRAM(s) Oral before breakfast  magnesium hydroxide Suspension 30 milliLiter(s) Oral once    MEDICATIONS  (PRN):  ondansetron Injectable 4 milliGRAM(s) IV Push every 6 hours PRN Nausea and/or Vomiting  HYDROmorphone  Injectable 0.5 milliGRAM(s) IV Push every 6 hours PRN Severe Pain (7 - 10)  HYDROmorphone  Injectable 0.25 milliGRAM(s) IV Push every 6 hours PRN Moderate Pain (4 - 6)  ketorolac   Injectable 15 milliGRAM(s) IV Push every 8 hours PRN pain      Allergies    No Known Allergies    Intolerances    Vital Signs Last 24 Hrs  T(C): 36.9 (22 Sep 2017 07:49), Max: 37.4 (21 Sep 2017 12:01)  T(F): 98.5 (22 Sep 2017 07:49), Max: 99.4 (21 Sep 2017 12:01)  HR: 54 (22 Sep 2017 07:49) (54 - 104)  BP: 119/76 (22 Sep 2017 07:49) (93/78 - 153/76)  BP(mean): 99 (21 Sep 2017 23:00) (67 - 105)  RR: 15 (22 Sep 2017 07:49) (12 - 39)  SpO2: 96% (22 Sep 2017 07:49) (83% - 100%)      I&O's Summary    21 Sep 2017 07:01  -  22 Sep 2017 07:00  --------------------------------------------------------  IN: 1035 mL / OUT: 201 mL / NET: 834 mL        20 Sep 2017 07:01  -  21 Sep 2017 07:00  --------------------------------------------------------  IN: 900 mL / OUT: 2 mL / NET: 898 mL      ON EXAM:    Constitutional: NAD, awake and alert, well-developed, comfortable  Eyes:  EOMI, no oral cyanosis, conjunctivae clear, anicteric.  Pulmonary: Non-labored, breath sounds are clear bilaterally, no wheezing, rales or rhonchi  Cardiovascular:  regular S1 and S2. 1/6 anteroapical murmur.  No rubs, gallops or clicks  Gastrointestinal: Bowel Sounds present, soft, surgical site dry and intact  Lymph: No peripheral edema.   Neurological: Alert, strength and sensitivity are grossly intact  Skin: No obvious lesions/rashes.   Psych:  Mood & affect appropriate .    LABS: All Labs Reviewed:                        11.2   9.0   )-----------( 201      ( 22 Sep 2017 07:14 )             32.8                           11.1   9.5   )-----------( 198      ( 21 Sep 2017 06:09 )             32.9                         11.9   7.1   )-----------( 197      ( 20 Sep 2017 14:37 )             34.9                         12.4   10.5  )-----------( 213      ( 19 Sep 2017 13:48 )             36.2     09-22    138  |  105  |  14  ----------------------------<  115<H>  3.8   |  24  |  0.64      21 Sep 2017 06:09    137    |  105    |  13     ----------------------------<  121    4.0     |  15     |  0.50   20 Sep 2017 14:20    138    |  102    |  12     ----------------------------<  67     3.4     |  22     |  0.64   19 Sep 2017 13:48    126    |  93     |  15     ----------------------------<  96     4.2     |  25     |  0.53     Ca    9.4      22 Sep 2017 07:14  Ca    8.6        21 Sep 2017 06:09  Ca    9.2        20 Sep 2017 14:20  Ca    9.2        19 Sep 2017 13:48    TPro  6.7    /  Alb  2.9    /  TBili  0.5    /  DBili  x      /  AST  58     /  ALT  56     /  AlkPhos  68     21 Sep 2017 06:09  TPro  7.3    /  Alb  3.6    /  TBili  1.1    /  DBili  x      /  AST  21     /  ALT  20     /  AlkPhos  76     19 Sep 2017 13:48

## 2017-09-22 NOTE — GOALS OF CARE CONVERSATION - PERSONAL ADVANCE DIRECTIVE - CONVERSATION DETAILS
met pt, family. reviewed hcp, form on chart, pt has spoken of her directives. pt remains a full code at present. contact # given.

## 2017-09-22 NOTE — PROGRESS NOTE ADULT - ASSESSMENT
89yo female with anemia, constipation, diverticulitis, fall, GERD, hypothyroidism, osteoporosis, pelvic fracture presents with 3 day hx of right sided lower back pain, with cholecystitis based on HIDA/MRI, s/p cholecystectomy.  - extubated, breathing well on room air  - Tolerated procedure well.  - there is no evidence of acute ischemia.  - there is no evidence of significant arrhythmia. No events on telemetry  - there is no evidence for meaningful  volume overload.  - exam and EKG do not suggest decompensated heart disease or significant valvular disease  - Continue with Aspirin 81 mg po daily  - DVT prophylaxis  - monitor electrolytes, keep k>4, Mg>2  - will follow 89yo female with anemia, constipation, diverticulitis, fall, GERD, hypothyroidism, osteoporosis, pelvic fracture presents with 3 day hx of right sided lower back pain, with cholecystitis based on HIDA/MRI, s/p cholecystectomy.    - Breathing well on room air  - Tolerated procedure well.  - there is no evidence of acute ischemia.  - there is no evidence of significant arrhythmia  - there is no evidence for meaningful  volume overload.  - exam and EKG do not suggest decompensated heart disease or significant valvular disease  - Continue with Aspirin 81 mg po daily  - DVT prophylaxis  - monitor electrolytes, keep k>4, Mg>2  - will follow

## 2017-09-22 NOTE — PROGRESS NOTE ADULT - SUBJECTIVE AND OBJECTIVE BOX
09-21-17 @ 07:01  -  09-22-17 @ 07:00  --------------------------------------------------------  IN: 1035 mL / OUT: 201 mL / NET: 834 mL      T(C): 36.9 (09-22-17 @ 07:49), Max: 37.4 (09-21-17 @ 12:01)  HR: 54 (09-22-17 @ 07:49) (54 - 104)  BP: 119/76 (09-22-17 @ 07:49) (93/78 - 153/76)  RR: 15 (09-22-17 @ 07:49) (12 - 39)  SpO2: 96% (09-22-17 @ 07:49) (83% - 100%)  Post OP Day#2      Incision clean dry intact.  Tolerating diet.  No BM.     Lungs-clear  Heart-RRR, no murmurs.                              11.2   9.0   )-----------( 201      ( 22 Sep 2017 07:14 )             32.8       138  |  105  |  14  ----------------------------<  115<H>  3.8   |  24  |  0.64

## 2017-09-23 PROCEDURE — 99232 SBSQ HOSP IP/OBS MODERATE 35: CPT

## 2017-09-23 RX ORDER — OXYCODONE HYDROCHLORIDE 5 MG/1
5 TABLET ORAL
Qty: 0 | Refills: 0 | Status: DISCONTINUED | OUTPATIENT
Start: 2017-09-23 | End: 2017-09-26

## 2017-09-23 RX ADMIN — Medication 650 MILLIGRAM(S): at 04:00

## 2017-09-23 RX ADMIN — OXYCODONE HYDROCHLORIDE 5 MILLIGRAM(S): 5 TABLET ORAL at 17:50

## 2017-09-23 RX ADMIN — Medication 4 MILLIGRAM(S): at 17:50

## 2017-09-23 RX ADMIN — LIDOCAINE 1 PATCH: 4 CREAM TOPICAL at 12:19

## 2017-09-23 RX ADMIN — Medication 4 MILLIGRAM(S): at 13:01

## 2017-09-23 RX ADMIN — Medication 15 MILLIGRAM(S): at 03:55

## 2017-09-23 RX ADMIN — Medication 12.5 MICROGRAM(S): at 05:21

## 2017-09-23 RX ADMIN — HEPARIN SODIUM 5000 UNIT(S): 5000 INJECTION INTRAVENOUS; SUBCUTANEOUS at 05:20

## 2017-09-23 RX ADMIN — Medication 100 MILLIGRAM(S): at 17:50

## 2017-09-23 RX ADMIN — Medication 650 MILLIGRAM(S): at 04:29

## 2017-09-23 RX ADMIN — HEPARIN SODIUM 5000 UNIT(S): 5000 INJECTION INTRAVENOUS; SUBCUTANEOUS at 22:11

## 2017-09-23 RX ADMIN — HEPARIN SODIUM 5000 UNIT(S): 5000 INJECTION INTRAVENOUS; SUBCUTANEOUS at 15:26

## 2017-09-23 RX ADMIN — Medication 4 MILLIGRAM(S): at 05:22

## 2017-09-23 RX ADMIN — Medication 1 TABLET(S): at 12:19

## 2017-09-23 RX ADMIN — PANTOPRAZOLE SODIUM 40 MILLIGRAM(S): 20 TABLET, DELAYED RELEASE ORAL at 05:21

## 2017-09-23 RX ADMIN — Medication 8 MILLIGRAM(S): at 22:11

## 2017-09-23 RX ADMIN — Medication 15 MILLIGRAM(S): at 04:28

## 2017-09-23 RX ADMIN — HYDROMORPHONE HYDROCHLORIDE 0.25 MILLIGRAM(S): 2 INJECTION INTRAMUSCULAR; INTRAVENOUS; SUBCUTANEOUS at 10:41

## 2017-09-23 RX ADMIN — Medication 81 MILLIGRAM(S): at 12:19

## 2017-09-23 RX ADMIN — HYDROMORPHONE HYDROCHLORIDE 0.25 MILLIGRAM(S): 2 INJECTION INTRAMUSCULAR; INTRAVENOUS; SUBCUTANEOUS at 10:59

## 2017-09-23 RX ADMIN — Medication 100 MILLIGRAM(S): at 05:21

## 2017-09-23 NOTE — PROGRESS NOTE ADULT - SUBJECTIVE AND OBJECTIVE BOX
Patient is a 89y old  Female who presents with a chief complaint of abd pain (19 Sep 2017 16:45)      INTERVAL /OVERNIGHT EVENTS: still c/o back pain    MEDICATIONS  (STANDING):  influenza   Vaccine 0.5 milliLiter(s) IntraMuscular once  aspirin enteric coated 81 milliGRAM(s) Oral daily  lidocaine   Patch 1 Patch Transdermal daily  calcium carbonate  625 mG + Vitamin D (OsCal 250 + D) 1 Tablet(s) Oral daily  levothyroxine 12.5 MICROGram(s) Oral daily  heparin  Injectable 5000 Unit(s) SubCutaneous every 8 hours  pantoprazole    Tablet 40 milliGRAM(s) Oral before breakfast  methylPREDNISolone   Oral   methylPREDNISolone 4 milliGRAM(s) Oral before breakfast  methylPREDNISolone 4 milliGRAM(s) Oral after lunch  methylPREDNISolone 4 milliGRAM(s) Oral after dinner  methylPREDNISolone 8 milliGRAM(s) Oral at bedtime  docusate sodium 100 milliGRAM(s) Oral two times a day    MEDICATIONS  (PRN):  acetaminophen   Tablet. 650 milliGRAM(s) Oral every 6 hours PRN Mild Pain (1 - 3)  traMADol 25 milliGRAM(s) Oral four times a day PRN Moderate Pain (4 - 6)  oxyCODONE    IR 5 milliGRAM(s) Oral four times a day PRN Severe Pain (7 - 10)      Allergies    No Known Allergies    Intolerances        REVIEW OF SYSTEMS:  CONSTITUTIONAL: No fever, weight loss, or fatigue  EYES: No eye pain, visual disturbances, or discharge  ENMT:  No difficulty hearing, tinnitus, vertigo; No sinus or throat pain  NECK: No pain or stiffness  RESPIRATORY: No cough, wheezing, chills or hemoptysis; No shortness of breath  CARDIOVASCULAR: No chest pain, palpitations, dizziness, or leg swelling  GASTROINTESTINAL: No abdominal or epigastric pain. No nausea, vomiting, or hematemesis; No diarrhea or constipation. No melena or hematochezia.  GENITOURINARY: No dysuria, frequency, hematuria, or incontinence  NEUROLOGICAL: No headaches, memory loss, loss of strength, numbness, or tremors  SKIN: No itching, burning, rashes, or lesions   LYMPH NODES: No enlarged glands  ENDOCRINE: No heat or cold intolerance; No hair loss; No polydipsia or polyuria  MUSCULOSKELETAL: No joint pain or swelling; No muscle, back, or extremity pain  PSYCHIATRIC: No depression, anxiety, mood swings, or difficulty sleeping  HEME/LYMPH: No easy bruising, or bleeding gums  ALLERGY AND IMMUNOLOGIC: No hives or eczema    Vital Signs Last 24 Hrs  T(C): 36.4 (23 Sep 2017 07:48), Max: 37.3 (22 Sep 2017 17:20)  T(F): 97.6 (23 Sep 2017 07:48), Max: 99.1 (22 Sep 2017 17:20)  HR: 74 (23 Sep 2017 07:48) (74 - 89)  BP: 146/93 (23 Sep 2017 07:48) (134/83 - 159/87)  BP(mean): --  RR: 17 (23 Sep 2017 07:48) (16 - 17)  SpO2: 93% (23 Sep 2017 07:48) (93% - 98%)    PHYSICAL EXAM:  GENERAL: NAD, well-groomed, well-developed  HEAD:  Atraumatic, Normocephalic  EYES: EOMI, PERRLA, conjunctiva and sclera clear  ENMT: No tonsillar erythema, exudates, or enlargement; Moist mucous membranes, Good dentition, No lesions  NECK: Supple, No JVD, Normal thyroid  NERVOUS SYSTEM:  Alert & Oriented X3, Good concentration; Motor Strength 5/5 B/L upper and lower extremities; DTRs 2+ intact and symmetric  CHEST/LUNG: Clear to auscultation bilaterally; No rales, rhonchi, wheezing, or rubs  HEART: Regular rate and rhythm; No murmurs, rubs, or gallops  ABDOMEN: Soft, Nontender, Nondistended; Bowel sounds present  EXTREMITIES:  2+ Peripheral Pulses, No clubbing, cyanosis, or edema  LYMPH: No lymphadenopathy noted  SKIN: No rashes or lesions    LABS:      Ca    9.4        22 Sep 2017 07:14          CAPILLARY BLOOD GLUCOSE          RADIOLOGY & ADDITIONAL TESTS:    Notes Reviewed:  [x ] YES  [ ] NO    Care Discussed with Consultants/Other Providers [x ] YES  [ ] NO

## 2017-09-23 NOTE — PROGRESS NOTE ADULT - SUBJECTIVE AND OBJECTIVE BOX
[facility]    Deja Shipley, Lindsey, Jessica, Ankur, Valdo, Brittani      [phone]    CHIEF COMPLAINT: Patient is a 89y old  Female who presents with a chief complaint of abd pain (19 Sep 2017 16:45)      Follow Up: acute sandoval, now post op, back pain    Interim history: back pain no change    MEDICATIONS  (STANDING):  influenza   Vaccine 0.5 milliLiter(s) IntraMuscular once  aspirin enteric coated 81 milliGRAM(s) Oral daily  lidocaine   Patch 1 Patch Transdermal daily  calcium carbonate  625 mG + Vitamin D (OsCal 250 + D) 1 Tablet(s) Oral daily  levothyroxine 12.5 MICROGram(s) Oral daily  heparin  Injectable 5000 Unit(s) SubCutaneous every 8 hours  pantoprazole    Tablet 40 milliGRAM(s) Oral before breakfast  methylPREDNISolone   Oral   methylPREDNISolone 4 milliGRAM(s) Oral before breakfast  methylPREDNISolone 4 milliGRAM(s) Oral after lunch  methylPREDNISolone 4 milliGRAM(s) Oral after dinner  methylPREDNISolone 8 milliGRAM(s) Oral at bedtime  docusate sodium 100 milliGRAM(s) Oral two times a day    MEDICATIONS  (PRN):  HYDROmorphone  Injectable 0.5 milliGRAM(s) IV Push every 6 hours PRN Severe Pain (7 - 10)  HYDROmorphone  Injectable 0.25 milliGRAM(s) IV Push every 6 hours PRN Moderate Pain (4 - 6)  ketorolac   Injectable 15 milliGRAM(s) IV Push every 8 hours PRN pain  acetaminophen   Tablet. 650 milliGRAM(s) Oral every 6 hours PRN Mild Pain (1 - 3)  traMADol 25 milliGRAM(s) Oral four times a day PRN Moderate Pain (4 - 6)      REVIEW OF SYSTEMS:  eye, ent, GI, , allergic, dermatologic, musculoskeletal and neurologic are negative except as described above    Vital Signs Last 24 Hrs  T(C): 36.4 (23 Sep 2017 07:48), Max: 37.3 (22 Sep 2017 17:20)  T(F): 97.6 (23 Sep 2017 07:48), Max: 99.1 (22 Sep 2017 17:20)  HR: 74 (23 Sep 2017 07:48) (74 - 89)  BP: 146/93 (23 Sep 2017 07:48) (134/83 - 159/87)  BP(mean): --  RR: 17 (23 Sep 2017 07:48) (16 - 17)  SpO2: 93% (23 Sep 2017 07:48) (93% - 98%)    I&O's Summary    22 Sep 2017 07:01  -  23 Sep 2017 07:00  --------------------------------------------------------  IN: 120 mL / OUT: 0 mL / NET: 120 mL        Telemetry past 24h: off    PHYSICAL EXAM:    Constitutional: well-nourished, well-developed, NAD   HEENT:  MMM, sclerae anicteric, conjunctivae clear, no oral cyanosis.  Pulmonary: Non-labored, breath sounds are clear bilaterally, No wheezing, rales or rhonchi  Cardiovascular: Regular, S1 and S2.  No murmur.  No rubs, gallops or clicks  Gastrointestinal: Bowel Sounds present, soft, nontender.   Lymph: No peripheral edema.   Neurological: Alert, no focal deficits  Skin: No rashes.  Psych:  Mood & affect appropriate    LABS: All Labs Reviewed:                        11.2   9.0   )-----------( 201      ( 22 Sep 2017 07:14 )             32.8                         11.1   9.5   )-----------( 198      ( 21 Sep 2017 06:09 )             32.9                         11.9   7.1   )-----------( 197      ( 20 Sep 2017 14:37 )             34.9     22 Sep 2017 07:14    138    |  105    |  14     ----------------------------<  115    3.8     |  24     |  0.64   21 Sep 2017 06:09    137    |  105    |  13     ----------------------------<  121    4.0     |  15     |  0.50   20 Sep 2017 14:20    138    |  102    |  12     ----------------------------<  67     3.4     |  22     |  0.64     Ca    9.4        22 Sep 2017 07:14  Ca    8.6        21 Sep 2017 06:09  Ca    9.2        20 Sep 2017 14:20  Phos  1.5       22 Sep 2017 07:14  Mg     1.8       22 Sep 2017 07:14    TPro  6.7    /  Alb  2.9    /  TBili  0.5    /  DBili  x      /  AST  58     /  ALT  56     /  AlkPhos  68     21 Sep 2017 06:09          Blood Culture:         RADIOLOGY:    EKG:    Echo:

## 2017-09-23 NOTE — PROGRESS NOTE ADULT - SUBJECTIVE AND OBJECTIVE BOX
PULMONARY/CRITICAL CARE      INTERVAL HPI/OVERNIGHT EVENTS:    89y FemaleHPI: Still c/o back pain. No sob. Less abdominal pain. Moved bowels.  89 yo white female with few days of positional low bilateral back pain. Seen here yesterday and had ct scan which revealed marled degenerative  changes and stool. Ultram was prescribed but patient then developed worsening nausea and then occasional vomiting. Feels weaker today. Poor oral intake. Mild right sided abdominal pains x few days. No frequency or dysuria.  In ER patient was found to have RUQ tender with + escamilla's sign.  Patient is being admitted for further work up and treatment. (19 Sep 2017 16:45)        PAST MEDICAL & SURGICAL HISTORY:  Diverticulitis  Pelvic fracture  Fall  Anemia  Constipation  Osteoporosis  Diverticulitis  GERD (gastroesophageal reflux disease)  Hypothyroid  Reflux  Bronchitis  S/P small bowel resection  Ovarian cyst: right  S/P appendectomy  Hip fracture requiring operative repair: right        ICU Vital Signs Last 24 Hrs  T(C): 36.4 (23 Sep 2017 07:48), Max: 37.3 (22 Sep 2017 17:20)  T(F): 97.6 (23 Sep 2017 07:48), Max: 99.1 (22 Sep 2017 17:20)  HR: 74 (23 Sep 2017 07:48) (74 - 89)  BP: 146/93 (23 Sep 2017 07:48) (134/83 - 159/87)  BP(mean): --  ABP: --  ABP(mean): --  RR: 17 (23 Sep 2017 07:48) (16 - 17)  SpO2: 93% (23 Sep 2017 07:48) (93% - 98%)    Qtts:     I&O's Summary    22 Sep 2017 07:01  -  23 Sep 2017 07:00  --------------------------------------------------------  IN: 120 mL / OUT: 0 mL / NET: 120 mL      REVIEW OF SYSTEMS:    CONSTITUTIONAL: No fever, weight loss, or fatigue  EYES: No eye pain, visual disturbances, or discharge  ENMT:  No difficulty hearing, tinnitus, vertigo; No sinus or throat pain  NECK: No pain or stiffness  BREASTS: No pain, masses, or nipple discharge  RESPIRATORY: No cough, wheezing, chills or hemoptysis; No shortness of breath  CARDIOVASCULAR: No chest pain, palpitations, dizziness, or leg swelling  GASTROINTESTINAL: Mild  abdominal  pain. No nausea, vomiting, or hematemesis; No diarrhea or constipation. No melena or hematochezia.  GENITOURINARY: No dysuria, frequency, hematuria, or incontinence  NEUROLOGICAL: No headaches, memory loss, loss of strength, numbness, or tremors  SKIN: No itching, burning, rashes, or lesions   LYMPH NODES: No enlarged glands  ENDOCRINE: No heat or cold intolerance; No hair loss  MUSCULOSKELETAL: No joint pain or swelling; No muscle,or extremity pain, no calf tenderness  Has low back pain.  PSYCHIATRIC: No depression, anxiety, mood swings, or difficulty sleeping  HEME/LYMPH: No easy bruising, or bleeding gums  ALLERGY AND IMMUNOLOGIC: No hives or eczema      PHYSICAL EXAM:    GENERAL: NAD, well-groomed, well-developed, NAD  HEAD:  Atraumatic, Normocephalic  EYES: EOMI, PERRLA, conjunctiva and sclera clear  ENMT: No tonsillar erythema, exudates, or enlargement; Moist mucous membranes, Good dentition, No lesions  NECK: Supple, No JVD, Normal thyroid  NERVOUS SYSTEM:  Alert & Oriented X3, Good concentration; Motor Strength 5/5 B/L upper and lower extremities  CHEST/LUNG: Clear to percussion bilaterally; No rales, rhonchi, wheezing, or rubs  HEART: Regular rate and rhythm; No murmurs, rubs, or gallops  ABDOMEN: Soft, Mildly tender, Nondistended; Bowel sounds present  EXTREMITIES:  2+ Peripheral Pulses, No clubbing, cyanosis, or edema  LYMPH: No lymphadenopathy noted  SKIN: No rashes or lesions              LABS:                        11.2   9.0   )-----------( 201      ( 22 Sep 2017 07:14 )             32.8     09-22    138  |  105  |  14  ----------------------------<  115<H>  3.8   |  24  |  0.64    Ca    9.4      22 Sep 2017 07:14  Phos  1.5     09-22  Mg     1.8     09-22            vanco through     RADIOLOGY & ADDITIONAL STUDIES:Patient ID: [id number] Patient Name: LYNN ANDRADE   YOB: 1922 Sex: F        EXAM: MRI LUMBAR SPINE W O CONTRAST      PROCEDURE DATE: 09/22/2017        INTERPRETATION:    REASON FOR EXAM: 89-year-old woman back pain assess for stenosis.    TECHNIQUE: Standardized multiplanar fat and water weighted pulse sequences  were obtained without intravenous contrast.    COMPARISON: None    FINDINGS:  There is heterogeneous nonspecific marrow signal. There is mild acute to  early subacute compression deformity of the upper endplate of L1 (etiology  indeterminate) with mild posterior dorsal retropulsion deforming ventral  thecal sac and resulting in mild stenosis. In addition, there are chronic  compression deformity of the upper and lower endplates of L3 and mild  deformity of the upper endplate of T12 with a mid body juxtacortical  endplate Schmorl's node. There is scattered venous malformation/hemangioma  throughout the lumbar vertebrae.    There is mild lumbar scoliosis.    There is normal signal and position of the conus medullaris that terminates  at the level of L1.    T12 1: Compression deformity of the upper endplate of L1 with posterior  superior endplate retropulsion deforming ventral thecal sac and resulting in  mild stenosis.    L1-2: There is disc desiccation and decrease in height with mild bulge and  endplate spur, but no focal herniation. There are degenerative changes of  the facet joints. There is normal central canal and neural foramina.    L2-3: There is this desiccation and decrease in height. There is mild bulge  and endplate spur. There are discogenic degenerative changes of the  vertebral endplates. There are degenerative changes of the facet joints.  There is mild right foraminal stenosis.    L3-4: There is disc desiccation and normal height. There is broad-based  right foraminal protrusion and disc bulge deforming ventral thecal sac on  the right and extending into the right neural foramina. There are  degenerative changes of the facet joints with facet joint effusion. There is  mild canal and moderate right foraminal stenosis with impingement of the  exiting roots.    L4-5: There is disc desiccation and decrease in disc height. There are  degenerative changes of the facet joints. There is mild bulge and endplate  spur without focal herniation. There is mild canal and right foraminal  stenosis.    L5-S1: There is disc desiccation and decrease in disc height. There are  degenerative changes of the facet joints with minimal anterior  spondylolisthesis and broad-based bulge. There is mild canal and bilateral  foraminal stenosis.    Diffuse sacral osteopenia. There is mild degenerative changes of the  visualized sacroiliac joints.    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 There is atrophy  and fatty replacement of the lower paraspinal musculature right greater than  left and right psoas muscle. There are small cystic lesions in the kidneys  incompletely imaged.    IMPRESSION:    Acute to early subacute compression deformity of upper endplate of L1 with  mild stenosis convexity of the posterior superior endplate deforming ventral  thecal sac and about 5% reduction of height without significant stenosis.    Nonspecific heterogeneous marrow signal vertebral body hemangioma/venous  malformation. If additional evaluation is needed, correlate with CT or bone  scan.    Broad-based right foraminal protrusion/herniation and endplate spur L3-4  with moderate canal and right foraminal stenosis with impingement of the  exiting roots.    Mild right foraminal stenosis L2-3.    Mild canal and bilateral foraminal stenosis L5 1                CATHY BLANCHARD M.D., ATTENDING RADIOLOGIST  This document has been electronically signed. Sep 22 2017 2:25PM        CRITICAL CARE TIME SPENT:

## 2017-09-23 NOTE — PROGRESS NOTE ADULT - SUBJECTIVE AND OBJECTIVE BOX
LYNN ANDRADE  89y  Female    Patient is a 89y old  Female who presents with a chief complaint of abd pain (19 Sep 2017 16:45)    comfortable.     PAST MEDICAL & SURGICAL HISTORY:  Diverticulitis  Pelvic fracture  Fall  Anemia  Constipation  Osteoporosis  Diverticulitis  GERD (gastroesophageal reflux disease)  Hypothyroid  Reflux  Bronchitis  S/P small bowel resection  Ovarian cyst: right  S/P appendectomy  Hip fracture requiring operative repair: right          PHYSICAL EXAM:    T(C): 36.4 (09-23-17 @ 07:48), Max: 37.3 (09-22-17 @ 17:20)  HR: 74 (09-23-17 @ 07:48) (74 - 89)  BP: 146/93 (09-23-17 @ 07:48) (134/83 - 159/87)  RR: 17 (09-23-17 @ 07:48) (16 - 17)  SpO2: 93% (09-23-17 @ 07:48) (93% - 98%)  Wt(kg): --    I&O's Detail    22 Sep 2017 07:01  -  23 Sep 2017 07:00  --------------------------------------------------------  IN:    Oral Fluid: 120 mL  Total IN: 120 mL    OUT:  Total OUT: 0 mL    Total NET: 120 mL          Respiratory: clear anteriorly, decreased BS at bases  Cardiovascular: S1 S2  Gastrointestinal: soft NT ND +BS  Extremities: edema   Neuro: Awake and alert    MEDICATIONS  (STANDING):  influenza   Vaccine 0.5 milliLiter(s) IntraMuscular once  aspirin enteric coated 81 milliGRAM(s) Oral daily  lidocaine   Patch 1 Patch Transdermal daily  calcium carbonate  625 mG + Vitamin D (OsCal 250 + D) 1 Tablet(s) Oral daily  levothyroxine 12.5 MICROGram(s) Oral daily  heparin  Injectable 5000 Unit(s) SubCutaneous every 8 hours  pantoprazole    Tablet 40 milliGRAM(s) Oral before breakfast  methylPREDNISolone   Oral   methylPREDNISolone 4 milliGRAM(s) Oral before breakfast  methylPREDNISolone 4 milliGRAM(s) Oral after lunch  methylPREDNISolone 4 milliGRAM(s) Oral after dinner  methylPREDNISolone 8 milliGRAM(s) Oral at bedtime  docusate sodium 100 milliGRAM(s) Oral two times a day    MEDICATIONS  (PRN):  acetaminophen   Tablet. 650 milliGRAM(s) Oral every 6 hours PRN Mild Pain (1 - 3)  traMADol 25 milliGRAM(s) Oral four times a day PRN Moderate Pain (4 - 6)  oxyCODONE    IR 5 milliGRAM(s) Oral four times a day PRN Severe Pain (7 - 10)                            11.2   9.0   )-----------( 201      ( 22 Sep 2017 07:14 )             32.8       09-22    138  |  105  |  14  ----------------------------<  115<H>  3.8   |  24  |  0.64    Ca    9.4      22 Sep 2017 07:14  Phos  1.5     09-22  Mg     1.8     09-22

## 2017-09-23 NOTE — PROGRESS NOTE ADULT - SUBJECTIVE AND OBJECTIVE BOX
Neurology Follow up note    LYNN [id number]    HPI:  89 yo white female with few days of positional low bilateral back pain. Seen here yesterday and had ct scan which revealed marled degenerative  changes and stool. Ultram was prescribed but patient then developed worsening nausea and then occasional vomiting. Feels weaker today. Poor oral intake. Mild right sided abdominal pains x few days. No frequency or dysuria.  In ER patient was found to have RUQ tender with + escamilla's sign.  Patient is being admitted for further work up and treatment. (19 Sep 2017 16:45)      Interval History - less back pain today.    Patient is seen, chart was reviewed and case was discussed with the treatment team.  Pt is not in any distress.   Lying on bed comfortably.   No events reported overnight.   No clinical seizure was reported.  Sitting on chair bed comfortably.    is at bedside.    Vital Signs Last 24 Hrs  T(C): 36.7 (23 Sep 2017 16:59), Max: 36.7 (22 Sep 2017 23:42)  T(F): 98.1 (23 Sep 2017 16:59), Max: 98.1 (23 Sep 2017 16:59)  HR: 90 (23 Sep 2017 16:59) (74 - 90)  BP: 109/81 (23 Sep 2017 16:59) (109/81 - 159/87)  BP(mean): --  RR: 16 (23 Sep 2017 16:59) (16 - 17)  SpO2: 94% (23 Sep 2017 16:59) (93% - 94%)        REVIEW OF SYSTEMS:    Constitutional: No fever, weight loss or fatigue  Eyes: No eye pain, visual disturbances, or discharge  ENT:  No difficulty hearing, tinnitus, vertigo; No sinus or throat pain  Neck: No pain or stiffness  Respiratory: No cough, wheezing, chills or hemoptysis  Cardiovascular: No chest pain, palpitations, shortness of breath, dizziness or leg swelling  Gastrointestinal: No abdominal or epigastric pain. No nausea, vomiting or hematemesis; No diarrhea or constipation. No melena or hematochezia.  Genitourinary: No dysuria, frequency, hematuria or incontinence  Neurological: No headaches, memory loss, loss of strength, numbness or tremors  Psychiatric: No depression, anxiety, mood swings or difficulty sleeping  Musculoskeletal:   Skin: No itching, burning, rashes or lesions   Lymph Nodes: No enlarged glands  Endocrine: No heat or cold intolerance; No hair loss, No h/o diabetes or thyroid dysfunction  Allergy and Immunologic: No hives or eczema    On Neurological Examination:    Mental Status - Pt is alert, awake, oriented X3.. Follows commands well and able to answer questions appropriatel.Mood and affect  normal    Speech -  Normal.     Cranial Nerves - Pupils 3 mm equal and reactive to light, extraocular eye movements intact. Pt has no visual field deficit.  Pt has no facial asymmetry. Facial sensation is intact.Tongue - is in midline.    Muscle tone - atrophy.    Motor Exam - 4+/5 of UE.  LE; 4- 4/5 LEFT WEAKER THAN RIGHT        coordination:    Finger to nose: normal.    SLR POSITIVE ON LEFT.    Deep tendon Reflexes - 2 plus all over.      Neck Supple -  Yes.     MEDICATIONS    influenza   Vaccine 0.5 milliLiter(s) IntraMuscular once  aspirin enteric coated 81 milliGRAM(s) Oral daily  lidocaine   Patch 1 Patch Transdermal daily  calcium carbonate  625 mG + Vitamin D (OsCal 250 + D) 1 Tablet(s) Oral daily  levothyroxine 12.5 MICROGram(s) Oral daily  heparin  Injectable 5000 Unit(s) SubCutaneous every 8 hours  pantoprazole    Tablet 40 milliGRAM(s) Oral before breakfast  acetaminophen   Tablet. 650 milliGRAM(s) Oral every 6 hours PRN  traMADol 25 milliGRAM(s) Oral four times a day PRN  methylPREDNISolone   Oral   methylPREDNISolone 4 milliGRAM(s) Oral before breakfast  methylPREDNISolone 4 milliGRAM(s) Oral after lunch  methylPREDNISolone 4 milliGRAM(s) Oral after dinner  methylPREDNISolone 8 milliGRAM(s) Oral at bedtime  docusate sodium 100 milliGRAM(s) Oral two times a day  oxyCODONE    IR 5 milliGRAM(s) Oral four times a day PRN      Allergies    No Known Allergies    Intolerances        LABS:  CBC Full  -  ( 22 Sep 2017 07:14 )  WBC Count : 9.0 K/uL  Hemoglobin : 11.2 g/dL  Hematocrit : 32.8 %  Platelet Count - Automated : 201 K/uL  Mean Cell Volume : 94.9 fl  Mean Cell Hemoglobin : 32.5 pg  Mean Cell Hemoglobin Concentration : 34.3 gm/dL  Auto Neutrophil # : x  Auto Lymphocyte # : x  Auto Monocyte # : x  Auto Eosinophil # : x  Auto Basophil # : x  Auto Neutrophil % : x  Auto Lymphocyte % : x  Auto Monocyte % : x  Auto Eosinophil % : x  Auto Basophil % : x      09-22    138  |  105  |  14  ----------------------------<  115<H>  3.8   |  24  |  0.64    Ca    9.4      22 Sep 2017 07:14  Phos  1.5     09-22  Mg     1.8     09-22      Hemoglobin A1C:     Vitamin B12     RADIOLOGY;  < from: MRI Lumbar Spine w/o Cont (09.22.17 @ 13:31) >    EXAM:  MRI LUMBAR SPINE W O CONTRAST                            PROCEDURE DATE:  09/22/2017          INTERPRETATION:      REASON FOR EXAM: 89-year-old woman back pain assess for stenosis.         TECHNIQUE:   Standardized multiplanar fat and water weighted pulse   sequences were obtained without intravenous contrast.    COMPARISON:  None    FINDINGS:  There is heterogeneous nonspecific marrow signal. There is mild acute to   early subacute compression deformity of the upper endplate of L1   (etiology indeterminate) with mild posterior dorsal retropulsion   deforming ventral thecal sac and resulting in mild stenosis. In addition,   there are chronic compression deformity of the upper and lower endplates   of L3 and mild deformity of the upper endplate of T12 with a mid body   juxtacortical endplate Schmorl's node. There is scattered venous   malformation/hemangioma throughout the lumbar vertebrae.    There is mild lumbar scoliosis.      There is normal signal and position of the conus medullaris that   terminates at the level of L1.       T12 1: Compression deformity of the upper endplate of L1 with posterior   superior endplate retropulsion deforming ventral thecal sac and resulting   in mild stenosis.    L1-2:  There is disc desiccation and decrease in height with mild bulge   and endplate spur, but no focal herniation. There are degenerative   changes of the facet joints. There is normal central canal and neural   foramina.    L2-3:  There is this desiccation and decrease in height. There is mild   bulge and endplate spur. There are discogenic degenerative changes of the   vertebral endplates. There are degenerative changes of the facet joints.   There is mild right foraminal stenosis.    L3-4: There is disc desiccation and normal height. There is broad-based   right foraminal protrusion and disc bulge deforming ventral thecal sac on   the right and extending into the right neural foramina. There are   degenerative changes of the facet joints with facet joint effusion.There   is mild canal and moderate right foraminal stenosis with impingement of   the exiting roots.    L4-5: There is disc desiccation and decrease in disc height. There are   degenerative changes of the facet joints. There is mild bulge and   endplate spur without focal herniation. There is mild canal and right   foraminal stenosis.    L5-S1:  There is disc desiccation and decrease in disc height. There are   degenerative changes of the facet joints with minimal anterior   spondylolisthesis andbroad-based bulge.  There is mild canal and   bilateral foraminal stenosis.     Diffuse sacral osteopenia. There is mild degenerative changes of the   visualized sacroiliac joints.    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 There is  atrophy and fatty replacement of the lower paraspinal musculature right   greater than left and right psoas muscle. There are small cystic lesions   in the kidneys incompletely imaged.    IMPRESSION:    Acute to early subacute compression deformityof upper endplate of L1   with mild stenosis convexity of the posterior superior endplate deforming   ventral thecal sac and about 5% reduction of height without significant   stenosis.    Nonspecific heterogeneous marrow signal vertebral body hemangioma/venous   malformation. If additional evaluation is needed, correlate with CT or   bone scan.    Broad-based right foraminal protrusion/herniation and endplate spur L3-4   with moderate canal and right foraminal stenosis with impingement of the   exiting roots.    Mild right foraminal stenosis L2-3.    Mild canal and bilateral foraminal stenosis L5 1                 CATHY BLANCHARD M.D., ATTENDING RADIOLOGIST  This document has been electronically signed. Sep 22 2017  2:25PM              ASSESSMENT AND PLAN:      SUBACUTE LUMBAR FRACTURE WITH RADICULOPATHY.       CONTINUE STEROID AND LIDODERM.  OPIATES PRN.  Physical therapy evaluation.  OOB to chair/ambulation with assistance only.  BONE SCAN..  Plan of care was discussed with family. Questions answered.  Would continue to follow.

## 2017-09-23 NOTE — CHART NOTE - NSCHARTNOTEFT_GEN_A_CORE

## 2017-09-23 NOTE — PROGRESS NOTE ADULT - ASSESSMENT
89yo female with anemia, constipation, diverticulitis, fall, GERD, hypothyroidism, osteoporosis, pelvic fracture presents with 3 day hx of right sided lower back pain, with cholecystitis based on HIDA/MRI, s/p cholecystectomy.    - there is no evidence of acute ischemia.  - there is no evidence of significant arrhythmia  - there is no evidence for meaningful  volume overload.  - exam and EKG do not suggest decompensated heart disease or significant valvular disease  - Continue with Aspirin 81 mg po daily  - DVT prophylaxis  - monitor electrolytes, keep k>4, Mg>2  - conservative measures for compression fx  - dc planning per surgery and med  - will follow

## 2017-09-23 NOTE — PROGRESS NOTE ADULT - ASSESSMENT
Pt. well known to me with Acute cholecystitis, back pain following fall. Hx pancreatic lesion.  Stable postop Cholecystectomy.  Has compression fx L1, Disc herniation L3-4

## 2017-09-24 PROCEDURE — 99233 SBSQ HOSP IP/OBS HIGH 50: CPT

## 2017-09-24 RX ORDER — KETOROLAC TROMETHAMINE 30 MG/ML
15 SYRINGE (ML) INJECTION EVERY 8 HOURS
Qty: 0 | Refills: 0 | Status: DISCONTINUED | OUTPATIENT
Start: 2017-09-24 | End: 2017-09-26

## 2017-09-24 RX ADMIN — Medication 4 MILLIGRAM(S): at 06:20

## 2017-09-24 RX ADMIN — Medication 4 MILLIGRAM(S): at 21:51

## 2017-09-24 RX ADMIN — HEPARIN SODIUM 5000 UNIT(S): 5000 INJECTION INTRAVENOUS; SUBCUTANEOUS at 06:21

## 2017-09-24 RX ADMIN — OXYCODONE HYDROCHLORIDE 5 MILLIGRAM(S): 5 TABLET ORAL at 07:44

## 2017-09-24 RX ADMIN — OXYCODONE HYDROCHLORIDE 5 MILLIGRAM(S): 5 TABLET ORAL at 06:44

## 2017-09-24 RX ADMIN — Medication 1 TABLET(S): at 12:32

## 2017-09-24 RX ADMIN — HEPARIN SODIUM 5000 UNIT(S): 5000 INJECTION INTRAVENOUS; SUBCUTANEOUS at 14:51

## 2017-09-24 RX ADMIN — Medication 15 MILLIGRAM(S): at 21:51

## 2017-09-24 RX ADMIN — Medication 81 MILLIGRAM(S): at 12:32

## 2017-09-24 RX ADMIN — OXYCODONE HYDROCHLORIDE 5 MILLIGRAM(S): 5 TABLET ORAL at 00:33

## 2017-09-24 RX ADMIN — TRAMADOL HYDROCHLORIDE 25 MILLIGRAM(S): 50 TABLET ORAL at 13:33

## 2017-09-24 RX ADMIN — Medication 100 MILLIGRAM(S): at 06:20

## 2017-09-24 RX ADMIN — LIDOCAINE 1 PATCH: 4 CREAM TOPICAL at 12:32

## 2017-09-24 RX ADMIN — OXYCODONE HYDROCHLORIDE 5 MILLIGRAM(S): 5 TABLET ORAL at 01:33

## 2017-09-24 RX ADMIN — Medication 4 MILLIGRAM(S): at 13:33

## 2017-09-24 RX ADMIN — HEPARIN SODIUM 5000 UNIT(S): 5000 INJECTION INTRAVENOUS; SUBCUTANEOUS at 21:51

## 2017-09-24 RX ADMIN — PANTOPRAZOLE SODIUM 40 MILLIGRAM(S): 20 TABLET, DELAYED RELEASE ORAL at 06:20

## 2017-09-24 RX ADMIN — Medication 15 MILLIGRAM(S): at 22:07

## 2017-09-24 RX ADMIN — Medication 12.5 MICROGRAM(S): at 06:20

## 2017-09-24 RX ADMIN — LIDOCAINE 1 PATCH: 4 CREAM TOPICAL at 00:28

## 2017-09-24 RX ADMIN — TRAMADOL HYDROCHLORIDE 25 MILLIGRAM(S): 50 TABLET ORAL at 14:30

## 2017-09-24 RX ADMIN — Medication 4 MILLIGRAM(S): at 17:56

## 2017-09-24 RX ADMIN — Medication 100 MILLIGRAM(S): at 17:59

## 2017-09-24 NOTE — PROGRESS NOTE ADULT - SUBJECTIVE AND OBJECTIVE BOX
[facility]    Deja Shipley, Lindsey, Jessica, Ankur, Valdo, Brittani      [phone]    CHIEF COMPLAINT: Patient is a 89y old  Female who presents with a chief complaint of abd pain (19 Sep 2017 16:45)      Follow Up: elev bp, post op chol    Interim history: back pain significant, bp has been elevated    MEDICATIONS  (STANDING):  influenza   Vaccine 0.5 milliLiter(s) IntraMuscular once  aspirin enteric coated 81 milliGRAM(s) Oral daily  lidocaine   Patch 1 Patch Transdermal daily  calcium carbonate  625 mG + Vitamin D (OsCal 250 + D) 1 Tablet(s) Oral daily  levothyroxine 12.5 MICROGram(s) Oral daily  heparin  Injectable 5000 Unit(s) SubCutaneous every 8 hours  pantoprazole    Tablet 40 milliGRAM(s) Oral before breakfast  methylPREDNISolone   Oral   methylPREDNISolone 4 milliGRAM(s) Oral before breakfast  methylPREDNISolone 4 milliGRAM(s) Oral after lunch  methylPREDNISolone 4 milliGRAM(s) Oral after dinner  methylPREDNISolone 4 milliGRAM(s) Oral at bedtime  docusate sodium 100 milliGRAM(s) Oral two times a day    MEDICATIONS  (PRN):  acetaminophen   Tablet. 650 milliGRAM(s) Oral every 6 hours PRN Mild Pain (1 - 3)  traMADol 25 milliGRAM(s) Oral four times a day PRN Moderate Pain (4 - 6)  oxyCODONE    IR 5 milliGRAM(s) Oral four times a day PRN Severe Pain (7 - 10)      REVIEW OF SYSTEMS:  eye, ent, GI, , allergic, dermatologic, musculoskeletal and neurologic are negative except as described above    Vital Signs Last 24 Hrs  T(C): 36.8 (24 Sep 2017 08:25), Max: 37.1 (24 Sep 2017 00:29)  T(F): 98.2 (24 Sep 2017 08:25), Max: 98.7 (24 Sep 2017 00:29)  HR: 96 (24 Sep 2017 08:25) (80 - 96)  BP: 147/98 (24 Sep 2017 08:25) (109/81 - 162/97)  BP(mean): --  RR: 16 (24 Sep 2017 08:25) (16 - 16)  SpO2: 92% (24 Sep 2017 08:25) (92% - 96%)    I&O's Summary    23 Sep 2017 07:01  -  24 Sep 2017 07:00  --------------------------------------------------------  IN: 0 mL / OUT: 1 mL / NET: -1 mL        Telemetry past 24h:    PHYSICAL EXAM:    Constitutional: well-nourished, well-developed, NAD   HEENT:  MMM, sclerae anicteric, conjunctivae clear, no oral cyanosis.  Pulmonary: Non-labored, breath sounds are clear bilaterally, No wheezing, rales or rhonchi  Cardiovascular: Regular, S1 and S2.  No murmur.  No rubs, gallops or clicks  Gastrointestinal: Bowel Sounds present, soft, nontender.   Lymph: No peripheral edema.   Neurological: Alert, no focal deficits  Skin: No rashes.  Psych:  Mood & affect appropriate    LABS: All Labs Reviewed:                        11.2   9.0   )-----------( 201      ( 22 Sep 2017 07:14 )             32.8     22 Sep 2017 07:14    138    |  105    |  14     ----------------------------<  115    3.8     |  24     |  0.64     Ca    9.4        22 Sep 2017 07:14  Phos  1.5       22 Sep 2017 07:14  Mg     1.8       22 Sep 2017 07:14            Blood Culture:         RADIOLOGY:    EKG:    Echo:

## 2017-09-24 NOTE — PROGRESS NOTE ADULT - SUBJECTIVE AND OBJECTIVE BOX
Neurology Follow up note    LYNN [id number]    HPI:  89 yo white female with few days of positional low bilateral back pain. Seen here yesterday and had ct scan which revealed marled degenerative  changes and stool. Ultram was prescribed but patient then developed worsening nausea and then occasional vomiting. Feels weaker today. Poor oral intake. Mild right sided abdominal pains x few days. No frequency or dysuria.  In ER patient was found to have RUQ tender with + escamilla's sign.  Patient is being admitted for further work up and treatment. (19 Sep 2017 16:45)      Interval History - still c/o lbp.    Patient is seen, chart was reviewed and case was discussed with the treatment team.  Pt is not in any distress.   Lying on bed comfortably.   No events reported overnight.   No clinical seizure was reported.  Sitting on chair bed comfortably.    is at bedside.    Vital Signs Last 24 Hrs  T(C): 36.8 (24 Sep 2017 08:25), Max: 37.1 (24 Sep 2017 00:29)  T(F): 98.2 (24 Sep 2017 08:25), Max: 98.7 (24 Sep 2017 00:29)  HR: 96 (24 Sep 2017 08:25) (80 - 96)  BP: 147/98 (24 Sep 2017 08:25) (109/81 - 162/97)  BP(mean): --  RR: 16 (24 Sep 2017 08:25) (16 - 16)  SpO2: 92% (24 Sep 2017 08:25) (92% - 96%)        REVIEW OF SYSTEMS:    Constitutional: No fever, weight loss or fatigue  Eyes: No eye pain, visual disturbances, or discharge  ENT:  No difficulty hearing, tinnitus, vertigo; No sinus or throat pain  Neck: No pain or stiffness  Respiratory: No cough, wheezing, chills or hemoptysis  Cardiovascular: No chest pain, palpitations, shortness of breath, dizziness or leg swelling  Gastrointestinal: No abdominal or epigastric pain. No nausea, vomiting or hematemesis; No diarrhea or constipation. No melena or hematochezia.  Genitourinary: No dysuria, frequency, hematuria or incontinence  Neurological: No headaches, memory loss, loss of strength, numbness or tremors  Psychiatric: No depression, anxiety, mood swings or difficulty sleep  Skin: No itching, burning, rashes or lesions   Lymph Nodes: No enlarged glands  Endocrine: No heat or cold intolerance; No hair loss, No h/o diabetes or thyroid dysfunction  Allergy and Immunologic: No hives or eczema    On Neurological Examination:    Mental Status - Pt is alert, awake, oriented X3. Follows commands well and able to answer questions appropriately .Mood and affect  normal    Speech -  Normal.     Cranial Nerves - Pupils 3 mm equal and reactive to light, extraocular eye movements intact. Pt has no visual field deficit.  Pt has no facial asymmetry. Facial sensation is intact .Tongue - is in midline.        Motor Exam - 5/5 OF UE.  LE 3-4/5.    coordination:    Finger to nose: normal.      Deep tendon Reflexes - 2 plus all over.        Romberg - Negative.    Neck Supple -  Yes.     MEDICATIONS    influenza   Vaccine 0.5 milliLiter(s) IntraMuscular once  aspirin enteric coated 81 milliGRAM(s) Oral daily  lidocaine   Patch 1 Patch Transdermal daily  calcium carbonate  625 mG + Vitamin D (OsCal 250 + D) 1 Tablet(s) Oral daily  levothyroxine 12.5 MICROGram(s) Oral daily  heparin  Injectable 5000 Unit(s) SubCutaneous every 8 hours  pantoprazole    Tablet 40 milliGRAM(s) Oral before breakfast  acetaminophen   Tablet. 650 milliGRAM(s) Oral every 6 hours PRN  traMADol 25 milliGRAM(s) Oral four times a day PRN  methylPREDNISolone   Oral   methylPREDNISolone 4 milliGRAM(s) Oral before breakfast  methylPREDNISolone 4 milliGRAM(s) Oral after lunch  methylPREDNISolone 4 milliGRAM(s) Oral after dinner  methylPREDNISolone 4 milliGRAM(s) Oral at bedtime  docusate sodium 100 milliGRAM(s) Oral two times a day  oxyCODONE    IR 5 milliGRAM(s) Oral four times a day PRN  ketorolac   Injectable 15 milliGRAM(s) IV Push every 8 hours PRN      Allergies    No Known Allergies    Intolerances        LABS:            Hemoglobin A1C:     Vitamin B12     RADIOLOGY.  < from: MRI Lumbar Spine w/o Cont (09.22.17 @ 13:31) >    EXAM:  MRI LUMBAR SPINE W O CONTRAST                            PROCEDURE DATE:  09/22/2017          INTERPRETATION:      REASON FOR EXAM: 89-year-old woman back pain assess for stenosis.         TECHNIQUE:   Standardized multiplanar fat and water weighted pulse   sequences were obtained without intravenous contrast.    COMPARISON:  None    FINDINGS:  There is heterogeneous nonspecific marrow signal. There is mild acute to   early subacute compression deformity of the upper endplate of L1   (etiology indeterminate) with mild posterior dorsal retropulsion   deforming ventral thecal sac and resulting in mild stenosis. In addition,   there are chronic compression deformity of the upper and lower endplates   of L3 and mild deformity of the upper endplate of T12 with a mid body   juxtacortical endplate Schmorl's node. There is scattered venous   malformation/hemangioma throughout the lumbar vertebrae.    There is mild lumbar scoliosis.      There is normal signal and position of the conus medullaris that   terminates at the level of L1.       T12 1: Compression deformity of the upper endplate of L1 with posterior   superior endplate retropulsion deforming ventral thecal sac and resulting   in mild stenosis.    L1-2:  There is disc desiccation and decrease in height with mild bulge   and endplate spur, but no focal herniation. There are degenerative   changes of the facet joints. There is normal central canal and neural   foramina.    L2-3:  There is this desiccation and decrease in height. There is mild   bulge and endplate spur. There are discogenic degenerative changes of the   vertebral endplates. There are degenerative changes of the facet joints.   There is mild right foraminal stenosis.    L3-4: There is disc desiccation and normal height. There is broad-based   right foraminal protrusion and disc bulge deforming ventral thecal sac on   the right and extending into the right neural foramina. There are   degenerative changes of the facet joints with facet joint effusion.There   is mild canal and moderate right foraminal stenosis with impingement of   the exiting roots.    L4-5: There is disc desiccation and decrease in disc height. There are   degenerative changes of the facet joints. There is mild bulge and   endplate spur without focal herniation. There is mild canal and right   foraminal stenosis.    L5-S1:  There is disc desiccation and decrease in disc height. There are   degenerative changes of the facet joints with minimal anterior   spondylolisthesis andbroad-based bulge.  There is mild canal and   bilateral foraminal stenosis.     Diffuse sacral osteopenia. There is mild degenerative changes of the   visualized sacroiliac joints.    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 There is  atrophy and fatty replacement of the lower paraspinal musculature right   greater than left and right psoas muscle. There are small cystic lesions   in the kidneys incompletely imaged.    IMPRESSION:    Acute to early subacute compression deformityof upper endplate of L1   with mild stenosis convexity of the posterior superior endplate deforming   ventral thecal sac and about 5% reduction of height without significant   stenosis.    Nonspecific heterogeneous marrow signal vertebral body hemangioma/venous   malformation. If additional evaluation is needed, correlate with CT or   bone scan.    Broad-based right foraminal protrusion/herniation and endplate spur L3-4   with moderate canal and right foraminal stenosis with impingement of the   exiting roots.    Mild right foraminal stenosis L2-3.    Mild canal and bilateral foraminal stenosis L5 1                 CATHY BLANCHARD M.D., ATTENDING RADIOLOGIST  This document has been electronically signed. Sep 22 2017  2:25PM                < end of copied text >      ASSESSMENT AND PLAN:        LUMBAGO DUE TO LUMBAR FRACTURE AND SPINAL STENOSIS.    CONTINUE STEROID.  FOR BONE SCAN.  NARCOTIC PRN FOR PAIN.  Physical therapy evaluation.  OOB to chair/ambulation with assistance only.  Plan of care was discussed with family. Questions answered.  Would continue to follow.

## 2017-09-24 NOTE — PROGRESS NOTE ADULT - SUBJECTIVE AND OBJECTIVE BOX
Patient is a 89y old  Female who presents with a chief complaint of abd pain (19 Sep 2017 16:45)      INTERVAL HPI/OVERNIGHT EVENTS: Patient seen and examined. NAD. No complaints x back pain      Vital Signs Last 24 Hrs  T(C): 36.8 (24 Sep 2017 08:25), Max: 37.1 (24 Sep 2017 00:29)  T(F): 98.2 (24 Sep 2017 08:25), Max: 98.7 (24 Sep 2017 00:29)  HR: 96 (24 Sep 2017 08:25) (80 - 96)  BP: 147/98 (24 Sep 2017 08:25) (109/81 - 162/97)  BP(mean): --  RR: 16 (24 Sep 2017 08:25) (16 - 16)  SpO2: 92% (24 Sep 2017 08:25) (92% - 96%)I&O's Summary    23 Sep 2017 07:01  -  24 Sep 2017 07:00  --------------------------------------------------------  IN: 0 mL / OUT: 1 mL / NET: -1 mL        LABS:              CAPILLARY BLOOD GLUCOSE              influenza   Vaccine 0.5 milliLiter(s) IntraMuscular once  aspirin enteric coated 81 milliGRAM(s) Oral daily  lidocaine   Patch 1 Patch Transdermal daily  calcium carbonate  625 mG + Vitamin D (OsCal 250 + D) 1 Tablet(s) Oral daily  levothyroxine 12.5 MICROGram(s) Oral daily  heparin  Injectable 5000 Unit(s) SubCutaneous every 8 hours  pantoprazole    Tablet 40 milliGRAM(s) Oral before breakfast  acetaminophen   Tablet. 650 milliGRAM(s) Oral every 6 hours PRN  traMADol 25 milliGRAM(s) Oral four times a day PRN  methylPREDNISolone   Oral   methylPREDNISolone 4 milliGRAM(s) Oral before breakfast  methylPREDNISolone 4 milliGRAM(s) Oral after lunch  methylPREDNISolone 4 milliGRAM(s) Oral after dinner  methylPREDNISolone 4 milliGRAM(s) Oral at bedtime  docusate sodium 100 milliGRAM(s) Oral two times a day  oxyCODONE    IR 5 milliGRAM(s) Oral four times a day PRN  ketorolac   Injectable 15 milliGRAM(s) IV Push every 8 hours PRN      REVIEW OF SYSTEMS:  CONSTITUTIONAL: No fever, weight loss, or fatigue  NECK: No pain or stiffness  RESPIRATORY: No cough, wheezing, chills or hemoptysis; No shortness of breath  CARDIOVASCULAR: No chest pain, palpitations, dizziness, or leg swelling  GASTROINTESTINAL: No abdominal or epigastric pain. No nausea, vomiting, or hematemesis; No diarrhea or constipation. No melena or hematochezia.  GENITOURINARY: No dysuria, frequency, hematuria, or incontinence  NEUROLOGICAL: No headaches, loss of strength, numbness, or tremors  SKIN: No itching, burning  MUSCULOSKELETAL: No joint pain or swelling; No muscle, + back pain but no extremity pain  PSYCHIATRIC: No depression, mood swings, HEME/LYMPH: No easy bruising, or bleeding gums  ALLERY AND IMMUNOLOGIC: No hives       Consultant(s) Notes Reviewed:  [x ] YES  [ ] NO    PHYSICAL EXAM:  GENERAL: NAD, well-groomed, well-developed  HEAD:  Atraumatic, Normocephalic  EYES: EOMI, PERRLA, conjunctiva and sclera clear  ENMT: No tonsillar erythema, exudates, or enlargement; Moist mucous membranes  NECK: Supple, No JVD  NERVOUS SYSTEM:  Awake & alert  CHEST/LUNG: Clear to auscultation bilaterally; No rales, rhonchi, wheezing,  HEART: Regular rate and rhythm  ABDOMEN: Soft, Nontender, Nondistended; Bowel sounds present  EXTREMITIES:  No clubbing, cyanosis, or edema  LYMPH: No lymphadenopathy noted  SKIN: No rashes      Advanced care planning discussed with patient/family [x ] YES   [ ] NO

## 2017-09-24 NOTE — PROGRESS NOTE ADULT - ASSESSMENT
89yo female with anemia, constipation, diverticulitis, fall, GERD, hypothyroidism, osteoporosis, pelvic fracture presents with 3 day hx of right sided lower back pain, with cholecystitis based on HIDA/MRI, s/p cholecystectomy.    - there is no evidence of acute ischemia.  - there is no evidence of significant arrhythmia  - there is no evidence for meaningful  volume overload.  - exam and EKG do not suggest decompensated heart disease or significant valvular disease  - Continue with Aspirin 81 mg po daily  - bp has been elevated possibly from a combination of pain and use of steroids.  Will try to avoid adding medication for bp, though if bp is persistently high, this may be needed  - DVT prophylaxis  - monitor electrolytes, keep k>4, Mg>2  - conservative measures for compression fx  - d/w daughter at bedside  - will follow

## 2017-09-24 NOTE — PROGRESS NOTE ADULT - SUBJECTIVE AND OBJECTIVE BOX
PULMONARY/CRITICAL CARE      INTERVAL HPI/OVERNIGHT EVENTS:  Still c/o back pain. Abdomen much better. No sob.    89y FemaleHPI:  89 yo white female with few days of positional low bilateral back pain. Seen here yesterday and had ct scan which revealed marled degenerative  changes and stool. Ultram was prescribed but patient then developed worsening nausea and then occasional vomiting. Feels weaker today. Poor oral intake. Mild right sided abdominal pains x few days. No frequency or dysuria.  In ER patient was found to have RUQ tender with + escamilla's sign.  Patient is being admitted for further work up and treatment. (19 Sep 2017 16:45)        PAST MEDICAL & SURGICAL HISTORY:  Diverticulitis  Pelvic fracture  Fall  Anemia  Constipation  Osteoporosis  Diverticulitis  GERD (gastroesophageal reflux disease)  Hypothyroid  Reflux  Bronchitis  S/P small bowel resection  Ovarian cyst: right  S/P appendectomy  Hip fracture requiring operative repair: right        ICU Vital Signs Last 24 Hrs  T(C): 36.8 (24 Sep 2017 08:25), Max: 37.1 (24 Sep 2017 00:29)  T(F): 98.2 (24 Sep 2017 08:25), Max: 98.7 (24 Sep 2017 00:29)  HR: 96 (24 Sep 2017 08:25) (80 - 96)  BP: 147/98 (24 Sep 2017 08:25) (109/81 - 162/97)  BP(mean): --  ABP: --  ABP(mean): --  RR: 16 (24 Sep 2017 08:25) (16 - 16)  SpO2: 92% (24 Sep 2017 08:25) (92% - 96%)    Qtts:     I&O's Summary    23 Sep 2017 07:01  -  24 Sep 2017 07:00  --------------------------------------------------------  IN: 0 mL / OUT: 1 mL / NET: -1 mL          REVIEW OF SYSTEMS:    CONSTITUTIONAL: No fever, weight loss, or fatigue  EYES: No eye pain, visual disturbances, or discharge  ENMT:  No difficulty hearing, tinnitus, vertigo; No sinus or throat pain  NECK: No pain or stiffness  BREASTS: No pain, masses, or nipple discharge  RESPIRATORY: No cough, wheezing, chills or hemoptysis; No shortness of breath  CARDIOVASCULAR: No chest pain, palpitations, dizziness, or leg swelling  GASTROINTESTINAL: Mild  abdominal  pain. No nausea, vomiting, or hematemesis; No diarrhea or constipation. No melena or hematochezia.  GENITOURINARY: No dysuria, frequency, hematuria, or incontinence  NEUROLOGICAL: No headaches, memory loss, loss of strength, numbness, or tremors  SKIN: No itching, burning, rashes, or lesions   LYMPH NODES: No enlarged glands  ENDOCRINE: No heat or cold intolerance; No hair loss  MUSCULOSKELETAL: No joint pain or swelling; No muscle,or extremity pain, no calf tenderness  Has low back pain.  PSYCHIATRIC: No depression, anxiety, mood swings, or difficulty sleeping  HEME/LYMPH: No easy bruising, or bleeding gums  ALLERGY AND IMMUNOLOGIC: No hives or eczema      PHYSICAL EXAM:    GENERAL: NAD, well-groomed, well-developed, NAD  HEAD:  Atraumatic, Normocephalic  EYES: EOMI, PERRLA, conjunctiva and sclera clear  ENMT: No tonsillar erythema, exudates, or enlargement; Moist mucous membranes, Good dentition, No lesions  NECK: Supple, No JVD, Normal thyroid  NERVOUS SYSTEM:  Alert & Oriented X3, Good concentration; Motor Strength 5/5 B/L upper and lower extremities  CHEST/LUNG: Clear to percussion bilaterally; No rales, rhonchi, wheezing, or rubs  HEART: Regular rate and rhythm; No murmurs, rubs, or gallops  ABDOMEN: Soft, Mildly tender, Nondistended; Bowel sounds present  EXTREMITIES:  2+ Peripheral Pulses, No clubbing, cyanosis, or edema  LYMPH: No lymphadenopathy noted  SKIN: No rashes or lesions        LABS:                vanco through     RADIOLOGY & ADDITIONAL STUDIES:      CRITICAL CARE TIME SPENT:

## 2017-09-25 PROCEDURE — 78306 BONE IMAGING WHOLE BODY: CPT | Mod: 26

## 2017-09-25 PROCEDURE — 99233 SBSQ HOSP IP/OBS HIGH 50: CPT

## 2017-09-25 PROCEDURE — 78320: CPT | Mod: 26

## 2017-09-25 RX ADMIN — LIDOCAINE 1 PATCH: 4 CREAM TOPICAL at 11:33

## 2017-09-25 RX ADMIN — OXYCODONE HYDROCHLORIDE 5 MILLIGRAM(S): 5 TABLET ORAL at 13:11

## 2017-09-25 RX ADMIN — LIDOCAINE 1 PATCH: 4 CREAM TOPICAL at 23:00

## 2017-09-25 RX ADMIN — OXYCODONE HYDROCHLORIDE 5 MILLIGRAM(S): 5 TABLET ORAL at 13:45

## 2017-09-25 RX ADMIN — TRAMADOL HYDROCHLORIDE 25 MILLIGRAM(S): 50 TABLET ORAL at 20:54

## 2017-09-25 RX ADMIN — Medication 1 TABLET(S): at 11:33

## 2017-09-25 RX ADMIN — LIDOCAINE 1 PATCH: 4 CREAM TOPICAL at 00:49

## 2017-09-25 RX ADMIN — Medication 4 MILLIGRAM(S): at 13:11

## 2017-09-25 RX ADMIN — Medication 4 MILLIGRAM(S): at 22:35

## 2017-09-25 RX ADMIN — TRAMADOL HYDROCHLORIDE 25 MILLIGRAM(S): 50 TABLET ORAL at 08:10

## 2017-09-25 RX ADMIN — PANTOPRAZOLE SODIUM 40 MILLIGRAM(S): 20 TABLET, DELAYED RELEASE ORAL at 07:00

## 2017-09-25 RX ADMIN — Medication 4 MILLIGRAM(S): at 07:00

## 2017-09-25 RX ADMIN — Medication 100 MILLIGRAM(S): at 17:13

## 2017-09-25 RX ADMIN — TRAMADOL HYDROCHLORIDE 25 MILLIGRAM(S): 50 TABLET ORAL at 21:38

## 2017-09-25 RX ADMIN — Medication 81 MILLIGRAM(S): at 11:33

## 2017-09-25 RX ADMIN — Medication 100 MILLIGRAM(S): at 07:00

## 2017-09-25 RX ADMIN — Medication 12.5 MICROGRAM(S): at 06:59

## 2017-09-25 RX ADMIN — HEPARIN SODIUM 5000 UNIT(S): 5000 INJECTION INTRAVENOUS; SUBCUTANEOUS at 06:59

## 2017-09-25 RX ADMIN — HEPARIN SODIUM 5000 UNIT(S): 5000 INJECTION INTRAVENOUS; SUBCUTANEOUS at 22:35

## 2017-09-25 RX ADMIN — TRAMADOL HYDROCHLORIDE 25 MILLIGRAM(S): 50 TABLET ORAL at 07:08

## 2017-09-25 RX ADMIN — HEPARIN SODIUM 5000 UNIT(S): 5000 INJECTION INTRAVENOUS; SUBCUTANEOUS at 17:13

## 2017-09-25 NOTE — CONSULT NOTE ADULT - SUBJECTIVE AND OBJECTIVE BOX
Patient is a 89y Female who presents c/o low back pain radiating to the left leg s/p fall from standing to sitting onto the toilet approximately 3 weeks ago. She reports some weakness in her left leg secondary to pain. Patient normally is ambulatory in the community with a walker/cane used at times. Denies HS/LOC. Denies pain/injury elsewhere. Denies numbness/tingling/paresthesias/weakness. Denies bowel/bladder incontinence. Denies fevers/chills. Patient was admitted for back and abdominal pain found to have an acute cholecystitis s/p cholecystectomy POD 5. The patient reports she was treated for a hip fracture in 2013 by Dr. Tsang and has seen Dr. Pacheco for shoulder issues in the past.       PAST MEDICAL & SURGICAL HISTORY:  Diverticulitis  Pelvic fracture  Fall  Anemia  Constipation  Osteoporosis  Diverticulitis  GERD (gastroesophageal reflux disease)  Hypothyroid  Reflux  Bronchitis  S/P small bowel resection  Ovarian cyst: right  S/P appendectomy  Hip fracture requiring operative repair: right    MEDICATIONS  (STANDING):  influenza   Vaccine 0.5 milliLiter(s) IntraMuscular once  aspirin enteric coated 81 milliGRAM(s) Oral daily  lidocaine   Patch 1 Patch Transdermal daily  calcium carbonate  625 mG + Vitamin D (OsCal 250 + D) 1 Tablet(s) Oral daily  levothyroxine 12.5 MICROGram(s) Oral daily  heparin  Injectable 5000 Unit(s) SubCutaneous every 8 hours  pantoprazole    Tablet 40 milliGRAM(s) Oral before breakfast  methylPREDNISolone   Oral   methylPREDNISolone 4 milliGRAM(s) Oral before breakfast  methylPREDNISolone 4 milliGRAM(s) Oral after lunch  methylPREDNISolone 4 milliGRAM(s) Oral at bedtime  docusate sodium 100 milliGRAM(s) Oral two times a day      Allergies    No Known Allergies    Intolerances    Vital Signs Last 24 Hrs  T(C): 36.6 (09-25-17 @ 08:16), Max: 36.6 (09-24-17 @ 23:48)  T(F): 97.8 (09-25-17 @ 08:16), Max: 97.9 (09-24-17 @ 23:48)  HR: 67 (09-25-17 @ 08:16) (67 - 85)  BP: 152/92 (09-25-17 @ 08:16) (145/84 - 152/92)  RR: 16 (09-25-17 @ 08:16) (16 - 16)  SpO2: 92% (09-25-17 @ 08:16) (92% - 94%)    Gen: NAD    Spine PE:  Skin intact  No gross deformity  No midline TTP C/T/L/S spine  No bony step offs  No paraspinal muscle ttp/hypertonicity   Negative clonus  Negative babinski  Negative silva    Motor:            ElbowFlex    WristExt   ElbowExt   FingerFlex   FingerAbd     R            5/5                5/5            5/5             5/5               5/5  L             5/5               5/5             5/5             5/5               5/5                    C5                C6              C7               C8           T1   R            5/5                5/5            5/5             5/5          5/5  L             5/5               5/5             5/5             5/5          5/5          HipFlex   HipExt   KneeFlex   KneeExt   AnkleDorsi   AnklePlantar   HaluxDorsi   HaluxPlantar  R        5/5        5/5            5/5            5/5             5/5                 5/5                    5/5                5/5  L         4/5        4/5            4/5            4/5             5/5                 5/5                    5/5                5/5   (weakness appears to be secondary to pain)                L2             L3             L4               L5            S1  R         5/5           5/5          5/5             5/5           5/5  L          5/5          5/5           5/5             5/5           5/5    Sensory:            C5         C6         C7      C8       T1        (0=absent, 1=impaired, 2=normal, NT=not testable)  R        2          2              2        2         2  L         2          2              2        2         2               L2          L3         L4      L5       S1         (0=absent, 1=impaired, 2=normal, NT=not testable)  R          2          2              2        2         2  L          2          2              2        2         2    MRI Lumbar Spine: L1 Vertebral compression fracture, L3-4 Herniated Nucleus pulposus, L5-S1 Canal Stenosis, L2-L3 Right Foraminal Stenosis.

## 2017-09-25 NOTE — PROGRESS NOTE ADULT - SUBJECTIVE AND OBJECTIVE BOX
Patient is a 89y old  Female who presents with a chief complaint of abd pain (19 Sep 2017 16:45)      Patient seen in follow up for hyponatremia. Improved and stable sodium levels. Family at bedside. + pain.     PAST MEDICAL HISTORY:  Diverticulitis  Pelvic fracture  Fall  Anemia  Constipation  Osteoporosis  Diverticulitis  GERD (gastroesophageal reflux disease)  Hypothyroid  Reflux  Bronchitis    MEDICATIONS  (STANDING):  influenza   Vaccine 0.5 milliLiter(s) IntraMuscular once  aspirin enteric coated 81 milliGRAM(s) Oral daily  lidocaine   Patch 1 Patch Transdermal daily  calcium carbonate  625 mG + Vitamin D (OsCal 250 + D) 1 Tablet(s) Oral daily  levothyroxine 12.5 MICROGram(s) Oral daily  heparin  Injectable 5000 Unit(s) SubCutaneous every 8 hours  pantoprazole    Tablet 40 milliGRAM(s) Oral before breakfast  methylPREDNISolone   Oral   methylPREDNISolone 4 milliGRAM(s) Oral before breakfast  methylPREDNISolone 4 milliGRAM(s) Oral at bedtime  docusate sodium 100 milliGRAM(s) Oral two times a day    MEDICATIONS  (PRN):  acetaminophen   Tablet. 650 milliGRAM(s) Oral every 6 hours PRN Mild Pain (1 - 3)  traMADol 25 milliGRAM(s) Oral four times a day PRN Moderate Pain (4 - 6)  oxyCODONE    IR 5 milliGRAM(s) Oral four times a day PRN Severe Pain (7 - 10)  ketorolac   Injectable 15 milliGRAM(s) IV Push every 8 hours PRN back pain    T(C): 36.6 (09-25-17 @ 08:16), Max: 37.1 (09-24-17 @ 00:29)  HR: 67 (09-25-17 @ 08:16) (67 - 96)  BP: 152/92 (09-25-17 @ 08:16) (109/81 - 162/97)  RR: 16 (09-25-17 @ 08:16)  SpO2: 92% (09-25-17 @ 08:16)  Wt(kg): --  I&O's Detail    25 Sep 2017 07:01  -  25 Sep 2017 14:52  --------------------------------------------------------  IN:    Oral Fluid: 240 mL  Total IN: 240 mL    OUT:  Total OUT: 0 mL    Total NET: 240 mL              PHYSICAL EXAM:  General: NAD  Respiratory: b/l air entry  Cardiovascular: S1 S2  Gastrointestinal: soft, s/p surgery  Extremities:  no edema      LABORATORY:  No recent labs

## 2017-09-25 NOTE — PROGRESS NOTE ADULT - PROBLEM SELECTOR PLAN 5
s/p lap cholecystectomy  d/w dtr
needs cholecystectomy  d/w dtr
s/p cholecystectomy  d/w dtr
s/p cholecystectomy  d/w dtr
s/p lap cholecystectomy  d/w dtr
s/p lap cholecystectomy  d/w dtr

## 2017-09-25 NOTE — PROGRESS NOTE ADULT - PROBLEM SELECTOR PLAN 3
add zofran  HIDA + for cholecystitis  from cholecystitis  improved
add zofran  HIDA + for cholecystitis  from cholecystitis
add zofran  HIDA + for cholecystitis  from cholecystitis  improved
add zofran  HIDA + for cholecystitis  from cholecystitis  improved
add zofran  check HIDA
add zofran  check HIDA  from cholecystitis

## 2017-09-25 NOTE — PROGRESS NOTE ADULT - SUBJECTIVE AND OBJECTIVE BOX
Patient is a 89y old  Female who presents with a chief complaint of abd pain (19 Sep 2017 16:45)      INTERVAL HPI/OVERNIGHT EVENTS: Patient seen and examined. NAD. No complaints x back pain.      Vital Signs Last 24 Hrs  T(C): 36.6 (25 Sep 2017 17:03), Max: 36.6 (24 Sep 2017 23:48)  T(F): 97.8 (25 Sep 2017 17:03), Max: 97.9 (24 Sep 2017 23:48)  HR: 82 (25 Sep 2017 17:03) (67 - 85)  BP: 152/95 (25 Sep 2017 17:03) (151/85 - 152/95)  BP(mean): --  RR: 16 (25 Sep 2017 17:03) (16 - 16)  SpO2: 91% (25 Sep 2017 17:03) (91% - 94%)I&O's Summary    25 Sep 2017 07:01  -  25 Sep 2017 18:16  --------------------------------------------------------  IN: 240 mL / OUT: 0 mL / NET: 240 mL        LABS:              CAPILLARY BLOOD GLUCOSE              influenza   Vaccine 0.5 milliLiter(s) IntraMuscular once  aspirin enteric coated 81 milliGRAM(s) Oral daily  lidocaine   Patch 1 Patch Transdermal daily  calcium carbonate  625 mG + Vitamin D (OsCal 250 + D) 1 Tablet(s) Oral daily  levothyroxine 12.5 MICROGram(s) Oral daily  heparin  Injectable 5000 Unit(s) SubCutaneous every 8 hours  pantoprazole    Tablet 40 milliGRAM(s) Oral before breakfast  acetaminophen   Tablet. 650 milliGRAM(s) Oral every 6 hours PRN  traMADol 25 milliGRAM(s) Oral four times a day PRN  methylPREDNISolone   Oral   methylPREDNISolone 4 milliGRAM(s) Oral before breakfast  methylPREDNISolone 4 milliGRAM(s) Oral at bedtime  docusate sodium 100 milliGRAM(s) Oral two times a day  oxyCODONE    IR 5 milliGRAM(s) Oral four times a day PRN  ketorolac   Injectable 15 milliGRAM(s) IV Push every 8 hours PRN      REVIEW OF SYSTEMS:  CONSTITUTIONAL: No fever, weight loss, or fatigue  NECK: No pain or stiffness  RESPIRATORY: No cough, wheezing, chills or hemoptysis; No shortness of breath  CARDIOVASCULAR: No chest pain, palpitations, dizziness, or leg swelling  GASTROINTESTINAL: No abdominal or epigastric pain. No nausea, vomiting, or hematemesis; No diarrhea or constipation. No melena or hematochezia.  GENITOURINARY: No dysuria, frequency, hematuria, or incontinence  NEUROLOGICAL: No headaches, loss of strength, numbness, or tremors  SKIN: No itching, burning  MUSCULOSKELETAL: No joint pain or swelling; No muscle, back, or extremity pain  PSYCHIATRIC: No depression, mood swings, HEME/LYMPH: No easy bruising, or bleeding gums  ALLERY AND IMMUNOLOGIC: No hives       Consultant(s) Notes Reviewed:  [ ] YES  [ ] NO    PHYSICAL EXAM:  GENERAL: NAD, well-groomed, well-developed  HEAD:  Atraumatic, Normocephalic  EYES: EOMI, PERRLA, conjunctiva and sclera clear  ENMT: No tonsillar erythema, exudates, or enlargement; Moist mucous membranes  NECK: Supple, No JVD  NERVOUS SYSTEM:  Awake & alert  CHEST/LUNG: Clear to auscultation bilaterally; No rales, rhonchi, wheezing,  HEART: Regular rate and rhythm  ABDOMEN: Soft, Nontender, Nondistended; Bowel sounds present  EXTREMITIES:  No clubbing, cyanosis, or edema  LYMPH: No lymphadenopathy noted  SKIN: No rashes      Advanced care planning discussed with patient/family [ ] YES   [ ] NO

## 2017-09-25 NOTE — PROGRESS NOTE ADULT - SUBJECTIVE AND OBJECTIVE BOX
HPI:  89 yo white female with few days of positional low bilateral back pain. Seen here yesterday and had ct scan which revealed marled degenerative  changes and stool. Ultram was prescribed but patient then developed worsening nausea and then occasional vomiting. Feels weaker today. Poor oral intake. Mild right sided abdominal pains x few days. No frequency or dysuria.  In ER patient was found to have RUQ tender with + escamilla's sign.  Patient is being admitted for further work up and treatment. (19 Sep 2017 16:45)    SUBJECTIVE:  Patient is a 89y old  Female who presents with a chief complaint of abd pain (19 Sep 2017 16:45)    Still c/o back pain exacerbated by activity.  denies CP, SOB, or palpitation.  Denies abdominal pain, nausea, or vomiting.    OBJECTIVE:  Review Of Systems:  Constitutional: [ ] Fever [ ] Chills [ ] Fatigue [ ] Weight change   HEENT: [ ] Blurred vision [ ] Eye Pain [ ] Headache [ ] Runny nose [ ] Sore Throat   Respiratory: [ ] Cough [ ] Wheezing [ ] Shortness of breath  Cardiovascular: [ ] Chest Pain [ ] Palpitations [ ] BENNETT [ ] PND [ ] Orthopnea  Gastrointestinal: [ ] Abdominal Pain [ ] Diarrhea [ ] Constipation [ ] Hemorrhoids [ ] Nausea [ ] Vomiting  Genitourinary: [ ] Nocturia [ ] Dysuria [ ] Incontinence  Extremities: [ ] Swelling [ ] Joint Pain  Neurologic: [ ] Focal deficit [ ] Paresthesias [ ] Syncope  Lymphatic: [ ] Swelling [ ] Lymphadenopathy   Skin: [ ] Rash [ ] Ecchymoses [ ] Wounds [ ] Lesions  Psychiatry: [ ] Depression [ ] Suicidal/Homicidal Ideation [ ] Anxiety [ ] Sleep Disturbances  [x ] 10 point review of systems is otherwise negative except as mentioned above            [ ]Unable to obtain    Allergy:  Allergies    No Known Allergies    Intolerances    Medications:  MEDICATIONS  (STANDING):  influenza   Vaccine 0.5 milliLiter(s) IntraMuscular once  aspirin enteric coated 81 milliGRAM(s) Oral daily  lidocaine   Patch 1 Patch Transdermal daily  calcium carbonate  625 mG + Vitamin D (OsCal 250 + D) 1 Tablet(s) Oral daily  levothyroxine 12.5 MICROGram(s) Oral daily  heparin  Injectable 5000 Unit(s) SubCutaneous every 8 hours  pantoprazole    Tablet 40 milliGRAM(s) Oral before breakfast  methylPREDNISolone   Oral   methylPREDNISolone 4 milliGRAM(s) Oral before breakfast  methylPREDNISolone 4 milliGRAM(s) Oral after lunch  methylPREDNISolone 4 milliGRAM(s) Oral at bedtime  docusate sodium 100 milliGRAM(s) Oral two times a day    MEDICATIONS  (PRN):  acetaminophen   Tablet. 650 milliGRAM(s) Oral every 6 hours PRN Mild Pain (1 - 3)  traMADol 25 milliGRAM(s) Oral four times a day PRN Moderate Pain (4 - 6)  oxyCODONE    IR 5 milliGRAM(s) Oral four times a day PRN Severe Pain (7 - 10)  ketorolac   Injectable 15 milliGRAM(s) IV Push every 8 hours PRN back pain    PMH/PSH/FH/SH: [ ] Unchanged    Vitals:  T(C): 36.6 (09-25-17 @ 08:16), Max: 36.6 (09-24-17 @ 23:48)  HR: 67 (09-25-17 @ 08:16) (67 - 85)  BP: 152/92 (09-25-17 @ 08:16) (145/84 - 152/92)  BP(mean): --  RR: 16 (09-25-17 @ 08:16) (16 - 16)  SpO2: 92% (09-25-17 @ 08:16) (92% - 94%)  Wt(kg): --  Daily     Daily   I&O's Summary    Labs:    ECG:  < from: 12 Lead ECG (09.20.17 @ 08:14) >    Ventricular Rate 73 BPM    Atrial Rate 73 BPM    P-R Interval 140 ms    QRS Duration 130 ms    Q-T Interval 428 ms    QTC Calculation(Bezet) 471 ms    P Axis 39 degrees    R Axis -26 degrees    T Axis 29 degrees    Diagnosis Line Normal sinus rhythm  Right bundle branch block  Abnormal ECG    Confirmed by Nam Barajas ([id number]) on 9/20/2017 10:12:14 AM    < end of copied text >    Stress Testing:     Cath:    Imaging:    Interpretation of Telemetry: Not on tele    Physical Exam:  Appearance: [x ] Normal  [ ] abnormal [ ] NAD   Eyes: [ x] PERRL [ ] EOMI  HENT: [ x] Normal [ ] Abnormal oral muscosa [ ]NC/AT  Cardiovascular: [x ] S1 [x ] S2 [x ] RRR [ ] m/r/g [ ]edema [ ] JVP  Procedural Access Site: [ ]  hematoma [ ] tender to palpation [ ] 2+ pulse [ ] bruit [ ] Ecchymosis  Respiratory: [x ] Clear to auscultation bilaterally  Gastrointestinal: [x ] Soft [ ] tenderness[ ] distension [x ] BS  Musculoskeletal: [ ] clubbing [ ] joint deformity   Neurologic: [x ] Non-focal  Lymphatic: [ ] lymphadenopathy  Psychiatry: [x ] AAOx3  [ ] confused [ ] disoriented [ x] Mood & affect appropriate  Skin: [ ]  rashes [ ] ecchymoses [ ] cyanosis

## 2017-09-25 NOTE — CONSULT NOTE ADULT - ASSESSMENT
A/P: 89y Female with L1 vertebral compression fracture and Lumbar Degenerative Disc Disease  Pain control  PT, out of bed encourage ambulation  TLSO brace for pain control  WBAT with assistive devices as needed  No further orthopedic intervention  FU with Dr. Yee as outpatient following discharge  Orthopedic stable for DC

## 2017-09-25 NOTE — PROGRESS NOTE ADULT - ASSESSMENT
Pt. well known to me with Acute cholecystitis, back pain following fall. Hx pancreatic lesion.  Stable postop Cholecystectomy.  Has compression fx L1, Disc herniation L3-4  Still some pain. Will need outpt. epidural.

## 2017-09-25 NOTE — PROGRESS NOTE ADULT - ASSESSMENT
89yo female with anemia, constipation, diverticulitis, fall, GERD, hypothyroidism, osteoporosis, pelvic fracture presents with 3 day hx of right sided lower back pain, with cholecystitis based on HIDA/MRI, s/p cholecystectomy.    - Remained stable post cholecystectomy  - There is no evidence of acute ischemia.  - There is no evidence of significant arrhythmia  - There is no evidence for meaningful  volume overload.  - Exam and EKG do not suggest decompensated heart disease or significant valvular disease  - Continue with Aspirin 81 mg po daily  - BP has been elevated possibly from a combination of pain and use of steroids.  slightly better now at 140-150  - Pain control  - For bone scan today  - DVT prophylaxis  - Activity as tolerated  - Monitor electrolytes, keep k>4, Mg>2  - Conservative measures for compression fx  - Further cardiac workup as case unfolds  - Will continue to follow    Kina Wesley NP  Cardiology

## 2017-09-25 NOTE — PROGRESS NOTE ADULT - PROBLEM SELECTOR PLAN 1
Antibiotics.  Incentive spirometry.  SCD's  PT  Ambulate  MRI Incentive spirometry.  SCD's  PT  Ambulate  MRI

## 2017-09-25 NOTE — PROGRESS NOTE ADULT - SUBJECTIVE AND OBJECTIVE BOX
PULMONARY/CRITICAL CARE      INTERVAL HPI/OVERNIGHT EVENTS:  Pt. stable. Less abd, back pain. Ambulated.  89y FemaleHPI:  89 yo white female with few days of positional low bilateral back pain. Seen here yesterday and had ct scan which revealed marled degenerative  changes and stool. Ultram was prescribed but patient then developed worsening nausea and then occasional vomiting. Feels weaker today. Poor oral intake. Mild right sided abdominal pains x few days. No frequency or dysuria.  In ER patient was found to have RUQ tender with + escamilla's sign.  Patient is being admitted for further work up and treatment. (19 Sep 2017 16:45)        PAST MEDICAL & SURGICAL HISTORY:  Diverticulitis  Pelvic fracture  Fall  Anemia  Constipation  Osteoporosis  Diverticulitis  GERD (gastroesophageal reflux disease)  Hypothyroid  Reflux  Bronchitis  S/P small bowel resection  Ovarian cyst: right  S/P appendectomy  Hip fracture requiring operative repair: right        ICU Vital Signs Last 24 Hrs  T(C): 36.6 (25 Sep 2017 08:16), Max: 36.6 (24 Sep 2017 23:48)  T(F): 97.8 (25 Sep 2017 08:16), Max: 97.9 (24 Sep 2017 23:48)  HR: 67 (25 Sep 2017 08:16) (67 - 85)  BP: 152/92 (25 Sep 2017 08:16) (145/84 - 152/92)  BP(mean): --  ABP: --  ABP(mean): --  RR: 16 (25 Sep 2017 08:16) (16 - 16)  SpO2: 92% (25 Sep 2017 08:16) (92% - 94%)    Qtts:     I&O's Summary      REVIEW OF SYSTEMS:    CONSTITUTIONAL: No fever, weight loss, or fatigue  EYES: No eye pain, visual disturbances, or discharge  ENMT:  No difficulty hearing, tinnitus, vertigo; No sinus or throat pain  NECK: No pain or stiffness  BREASTS: No pain, masses, or nipple discharge  RESPIRATORY: No cough, wheezing, chills or hemoptysis; No shortness of breath  CARDIOVASCULAR: No chest pain, palpitations, dizziness, or leg swelling  GASTROINTESTINAL: Mild  abdominal  pain. No nausea, vomiting, or hematemesis; No diarrhea or constipation. No melena or hematochezia.  GENITOURINARY: No dysuria, frequency, hematuria, or incontinence  NEUROLOGICAL: No headaches, memory loss, loss of strength, numbness, or tremors  SKIN: No itching, burning, rashes, or lesions   LYMPH NODES: No enlarged glands  ENDOCRINE: No heat or cold intolerance; No hair loss  MUSCULOSKELETAL: No joint pain or swelling; No muscle,or extremity pain, no calf tenderness  Has low back pain.  PSYCHIATRIC: No depression, anxiety, mood swings, or difficulty sleeping  HEME/LYMPH: No easy bruising, or bleeding gums  ALLERGY AND IMMUNOLOGIC: No hives or eczema      PHYSICAL EXAM:    GENERAL: NAD, well-groomed, well-developed, NAD  HEAD:  Atraumatic, Normocephalic  EYES: EOMI, PERRLA, conjunctiva and sclera clear  ENMT: No tonsillar erythema, exudates, or enlargement; Moist mucous membranes, Good dentition, No lesions  NECK: Supple, No JVD, Normal thyroid  NERVOUS SYSTEM:  Alert & Oriented X3, Good concentration; Motor Strength 5/5 B/L upper and lower extremities  CHEST/LUNG: Clear to percussion bilaterally; No rales, rhonchi, wheezing, or rubs  HEART: Regular rate and rhythm; No murmurs, rubs, or gallops  ABDOMEN: Soft, Mildly tender, Nondistended; Bowel sounds present  EXTREMITIES:  2+ Peripheral Pulses, No clubbing, cyanosis, or edema  LYMPH: No lymphadenopathy noted  SKIN: No rashes or lesions            LABS:                vanco through     RADIOLOGY & ADDITIONAL STUDIES:      CRITICAL CARE TIME SPENT:

## 2017-09-25 NOTE — PROGRESS NOTE ADULT - ATTENDING COMMENTS
Patient seen and examined. Agree with above with following changes:   89yo female with anemia, constipation, diverticulitis, fall, GERD, hypothyroidism, osteoporosis, pelvic fracture presents with 3 day hx of right sided lower back pain, with cholecystitis based on HIDA/MRI, s/p cholecystectomy.    - Remained stable post cholecystectomy  - There is no evidence of acute ischemia.  - There is no evidence of significant arrhythmia  - There is no evidence for meaningful  volume overload.  - Exam and EKG do not suggest decompensated heart disease or significant valvular disease  - Continue with Aspirin 81 mg po daily  - BP has been elevated possibly from a combination of pain and use of steroids.  slightly better now at 140-150  - Pain control  - For bone scan today  - DVT prophylaxis  - Activity as tolerated  - Monitor electrolytes, keep k>4, Mg>2  - Conservative measures for compression fx  - Further cardiac workup as case unfolds  - Will continue to follow
89 yo female with anemia, constipation, diverticulitis, GERD, hypothyroidism, osteoporosis, POD 1 s/p lap cholecystectomy with prolonged sedation and mechanical ventilation.    --pain control with dilaudid  --continue ASA for primary prevention  --successfully extubated this am after weaning trial  --advance diet per surgery, d/c IVF  --OOB, PT eval  --stable for floor  --discussed with pt, daughter and son at bedside

## 2017-09-25 NOTE — PROGRESS NOTE ADULT - PROBLEM SELECTOR PLAN 4
SCDs for DVT prevention

## 2017-09-25 NOTE — PROGRESS NOTE ADULT - SUBJECTIVE AND OBJECTIVE BOX
Neurology Follow up note    LYNN [id number]    HPI:  89 yo white female with few days of positional low bilateral back pain. Seen here yesterday and had ct scan which revealed marled degenerative  changes and stool. Ultram was prescribed but patient then developed worsening nausea and then occasional vomiting. Feels weaker today. Poor oral intake. Mild right sided abdominal pains x few days. No frequency or dysuria.  In ER patient was found to have RUQ tender with + escamilla's sign.  Patient is being admitted for further work up and treatment. (19 Sep 2017 16:45)      Interval History - still c/o lbp.    Patient is seen, chart was reviewed and case was discussed with the treatment team.  Pt is not in any distress.   Lying on bed comfortably.   No events reported overnight.   No clinical seizure was reported.  Sitting on chair bed comfortably.    is at bedside.    Vital Signs Last 24 Hrs  T(C): 36.6 (25 Sep 2017 17:03), Max: 36.6 (24 Sep 2017 23:48)  T(F): 97.8 (25 Sep 2017 17:03), Max: 97.9 (24 Sep 2017 23:48)  HR: 82 (25 Sep 2017 17:03) (67 - 85)  BP: 152/95 (25 Sep 2017 17:03) (151/85 - 152/95)  BP(mean): --  RR: 16 (25 Sep 2017 17:03) (16 - 16)  SpO2: 91% (25 Sep 2017 17:03) (91% - 94%)      REVIEW OF SYSTEMS:    Constitutional: No fever, weight loss or fatigue  Eyes: No eye pain, visual disturbances, or discharge  ENT:  No difficulty hearing, tinnitus, vertigo; No sinus or throat pain  Neck: No pain or stiffness  Respiratory: No cough, wheezing, chills or hemoptysis  Cardiovascular: No chest pain, palpitations, shortness of breath, dizziness or leg swelling  Gastrointestinal: No abdominal or epigastric pain. No nausea, vomiting or hematemesis; No diarrhea or constipation. No melena or hematochezia.  Genitourinary: No dysuria, frequency, hematuria or incontinence  Neurological: No headaches, memory loss, loss of strength, numbness or tremors  Psychiatric: No depression, anxiety, mood swings or difficulty sleep  Skin: No itching, burning, rashes or lesions   Lymph Nodes: No enlarged glands  Endocrine: No heat or cold intolerance; No hair loss, No h/o diabetes or thyroid dysfunction  Allergy and Immunologic: No hives or eczema    On Neurological Examination:    Mental Status - Pt is alert, awake, oriented X3. Follows commands well and able to answer questions appropriately .Mood and affect  normal    Speech -  Normal.     Cranial Nerves - Pupils 3 mm equal and reactive to light, extraocular eye movements intact. Pt has no visual field deficit.  Pt has no facial asymmetry. Facial sensation is intact .Tongue - is in midline.        Motor Exam - 5/5 OF UE.  LE 3-4/5.    coordination:    Finger to nose: normal.      Deep tendon Reflexes - 2 plus all over.        Romberg - Negative.    Neck Supple -  Yes.     MEDICATIONS    influenza   Vaccine 0.5 milliLiter(s) IntraMuscular once  aspirin enteric coated 81 milliGRAM(s) Oral daily  lidocaine   Patch 1 Patch Transdermal daily  calcium carbonate  625 mG + Vitamin D (OsCal 250 + D) 1 Tablet(s) Oral daily  levothyroxine 12.5 MICROGram(s) Oral daily  heparin  Injectable 5000 Unit(s) SubCutaneous every 8 hours  pantoprazole    Tablet 40 milliGRAM(s) Oral before breakfast  acetaminophen   Tablet. 650 milliGRAM(s) Oral every 6 hours PRN  traMADol 25 milliGRAM(s) Oral four times a day PRN  methylPREDNISolone   Oral   methylPREDNISolone 4 milliGRAM(s) Oral before breakfast  methylPREDNISolone 4 milliGRAM(s) Oral after lunch  methylPREDNISolone 4 milliGRAM(s) Oral after dinner  methylPREDNISolone 4 milliGRAM(s) Oral at bedtime  docusate sodium 100 milliGRAM(s) Oral two times a day  oxyCODONE    IR 5 milliGRAM(s) Oral four times a day PRN  ketorolac   Injectable 15 milliGRAM(s) IV Push every 8 hours PRN      Allergies    No Known Allergies    Intolerances        LABS:            Hemoglobin A1C:     Vitamin B12     RADIOLOGY.  < from: MRI Lumbar Spine w/o Cont (09.22.17 @ 13:31) >    EXAM:  MRI LUMBAR SPINE W O CONTRAST                            PROCEDURE DATE:  09/22/2017          INTERPRETATION:      REASON FOR EXAM: 89-year-old woman back pain assess for stenosis.         TECHNIQUE:   Standardized multiplanar fat and water weighted pulse   sequences were obtained without intravenous contrast.    COMPARISON:  None    FINDINGS:  There is heterogeneous nonspecific marrow signal. There is mild acute to   early subacute compression deformity of the upper endplate of L1   (etiology indeterminate) with mild posterior dorsal retropulsion   deforming ventral thecal sac and resulting in mild stenosis. In addition,   there are chronic compression deformity of the upper and lower endplates   of L3 and mild deformity of the upper endplate of T12 with a mid body   juxtacortical endplate Schmorl's node. There is scattered venous   malformation/hemangioma throughout the lumbar vertebrae.    There is mild lumbar scoliosis.      There is normal signal and position of the conus medullaris that   terminates at the level of L1.       T12 1: Compression deformity of the upper endplate of L1 with posterior   superior endplate retropulsion deforming ventral thecal sac and resulting   in mild stenosis.    L1-2:  There is disc desiccation and decrease in height with mild bulge   and endplate spur, but no focal herniation. There are degenerative   changes of the facet joints. There is normal central canal and neural   foramina.    L2-3:  There is this desiccation and decrease in height. There is mild   bulge and endplate spur. There are discogenic degenerative changes of the   vertebral endplates. There are degenerative changes of the facet joints.   There is mild right foraminal stenosis.    L3-4: There is disc desiccation and normal height. There is broad-based   right foraminal protrusion and disc bulge deforming ventral thecal sac on   the right and extending into the right neural foramina. There are   degenerative changes of the facet joints with facet joint effusion.There   is mild canal and moderate right foraminal stenosis with impingement of   the exiting roots.    L4-5: There is disc desiccation and decrease in disc height. There are   degenerative changes of the facet joints. There is mild bulge and   endplate spur without focal herniation. There is mild canal and right   foraminal stenosis.    L5-S1:  There is disc desiccation and decrease in disc height. There are   degenerative changes of the facet joints with minimal anterior   spondylolisthesis andbroad-based bulge.  There is mild canal and   bilateral foraminal stenosis.     Diffuse sacral osteopenia. There is mild degenerative changes of the   visualized sacroiliac joints.    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 There is  atrophy and fatty replacement of the lower paraspinal musculature right   greater than left and right psoas muscle. There are small cystic lesions   in the kidneys incompletely imaged.    IMPRESSION:    Acute to early subacute compression deformityof upper endplate of L1   with mild stenosis convexity of the posterior superior endplate deforming   ventral thecal sac and about 5% reduction of height without significant   stenosis.    Nonspecific heterogeneous marrow signal vertebral body hemangioma/venous   malformation. If additional evaluation is needed, correlate with CT or   bone scan.    Broad-based right foraminal protrusion/herniation and endplate spur L3-4   with moderate canal and right foraminal stenosis with impingement of the   exiting roots.    Mild right foraminal stenosis L2-3.    Mild canal and bilateral foraminal stenosis L5 1                 CATHY BLANCHARD M.D., ATTENDING RADIOLOGIST  This document has been electronically signed. Sep 22 2017  2:25PM          < from: NM Bone Imaging Total (09.25.17 @ 16:03) >    EXAM:  NM BONE SPECT                          EXAM:  NM BONE IMG WHOLE BODY                            PROCEDURE DATE:  09/25/2017          INTERPRETATION:  RADIOPHARMACEUTICAL: 18.0 mCi Tc-99m HDP, I.V.     CLINICAL STATEMENT: 89-year-old female with low back pain.    TECHNIQUE:  Anterior and posterior whole body images were obtained 2-3   hours following administration of radiotracer. SPECT of the thoracolumbar   spine was performed.    COMPARISON: No prior bone scan. CT of the abdomen and pelvis from   9/18/2017 and MRI of the lumbar spine from 9/22/2017 were reviewed.    FINDINGS: There is mild thoracolumbar scoliosis with associated   heterogeneous tracer distribution in the spine compatible with   degenerative changes. Degenerative changes are also seen in the major   joints. No abnormal activity associated with the right hip internal   fixation hardware.    Both kidneys are visualized and are symmetric in appearance.    IMPRESSION: Essentially normal bone scan.    Mild thoracolumbar scoliosis with associated degenerative changes in the   spine.    Degenerative changes in the major joints.                APOLINAR BARDALES M.D., NUCLEAR MEDICINE ATTENDING  This document has been electronically signed. Sep 25 2017  4:12PM                       ASSESSMENT AND PLAN:        LUMBAGO DUE TO LUMBAR FRACTURE AND SPINAL STENOSIS.    ortho evaluation called.   BONE SCAN, revealed no mets.  NARCOTIC PRN FOR PAIN.  Physical therapy evaluation.  OOB to chair/ambulation with assistance only.  Plan of care was discussed with family. Questions answered.  Would continue to follow.

## 2017-09-25 NOTE — CONSULT NOTE ADULT - CONSULT REASON
.
ABDOMINAL PAIN, CHOLECYSTITIS
Back Pain, Compression Fracture
failure to wean/ lethargy
Hyponatremia
clearance

## 2017-09-25 NOTE — CONSULT NOTE ADULT - CONSULT REQUESTED DATE/TIME
20-Sep-2017 06:31
20-Sep-2017 09:59
20-Sep-2017 22:50
25-Sep-2017 11:41
20-Sep-2017 13:22
20-Sep-2017 08:16

## 2017-09-25 NOTE — PROGRESS NOTE ADULT - ASSESSMENT
·	Hyponatremia: Low solute intake, Increased ADH with nausea/vomiting  ·	Cholecystitis, s/p lap sandoval.   ·	Hypertension  ·	Hypophosphatemia  ·	L 1 Compression fracture / Back pain    No recent labs. Will order labs for am. Pain control. Surgery follow up. Titrate BP meds as needed. Will follow electrolytes and renal function trend. D/w pt's daughter at bedside.

## 2017-09-26 ENCOUNTER — TRANSCRIPTION ENCOUNTER (OUTPATIENT)
Age: 82
End: 2017-09-26

## 2017-09-26 VITALS
OXYGEN SATURATION: 95 % | DIASTOLIC BLOOD PRESSURE: 84 MMHG | TEMPERATURE: 98 F | HEART RATE: 82 BPM | SYSTOLIC BLOOD PRESSURE: 119 MMHG | RESPIRATION RATE: 16 BRPM

## 2017-09-26 LAB
ALBUMIN SERPL ELPH-MCNC: 2.5 G/DL — LOW (ref 3.3–5)
ALP SERPL-CCNC: 58 U/L — SIGNIFICANT CHANGE UP (ref 40–120)
ALT FLD-CCNC: 59 U/L — SIGNIFICANT CHANGE UP (ref 12–78)
ANION GAP SERPL CALC-SCNC: 8 MMOL/L — SIGNIFICANT CHANGE UP (ref 5–17)
AST SERPL-CCNC: 45 U/L — HIGH (ref 15–37)
BILIRUB SERPL-MCNC: 0.5 MG/DL — SIGNIFICANT CHANGE UP (ref 0.2–1.2)
BUN SERPL-MCNC: 17 MG/DL — SIGNIFICANT CHANGE UP (ref 7–23)
CALCIUM SERPL-MCNC: 9 MG/DL — SIGNIFICANT CHANGE UP (ref 8.5–10.1)
CHLORIDE SERPL-SCNC: 100 MMOL/L — SIGNIFICANT CHANGE UP (ref 96–108)
CO2 SERPL-SCNC: 31 MMOL/L — SIGNIFICANT CHANGE UP (ref 22–31)
CREAT SERPL-MCNC: 0.64 MG/DL — SIGNIFICANT CHANGE UP (ref 0.5–1.3)
GLUCOSE SERPL-MCNC: 91 MG/DL — SIGNIFICANT CHANGE UP (ref 70–99)
HCT VFR BLD CALC: 30.1 % — LOW (ref 34.5–45)
HGB BLD-MCNC: 10.4 G/DL — LOW (ref 11.5–15.5)
MAGNESIUM SERPL-MCNC: 1.8 MG/DL — SIGNIFICANT CHANGE UP (ref 1.6–2.6)
MCHC RBC-ENTMCNC: 32.6 PG — SIGNIFICANT CHANGE UP (ref 27–34)
MCHC RBC-ENTMCNC: 34.4 GM/DL — SIGNIFICANT CHANGE UP (ref 32–36)
MCV RBC AUTO: 94.9 FL — SIGNIFICANT CHANGE UP (ref 80–100)
PHOSPHATE SERPL-MCNC: 3.7 MG/DL — SIGNIFICANT CHANGE UP (ref 2.5–4.5)
PLATELET # BLD AUTO: 264 K/UL — SIGNIFICANT CHANGE UP (ref 150–400)
POTASSIUM SERPL-MCNC: 3.6 MMOL/L — SIGNIFICANT CHANGE UP (ref 3.5–5.3)
POTASSIUM SERPL-SCNC: 3.6 MMOL/L — SIGNIFICANT CHANGE UP (ref 3.5–5.3)
PROT SERPL-MCNC: 5.5 G/DL — LOW (ref 6–8.3)
RBC # BLD: 3.17 M/UL — LOW (ref 3.8–5.2)
RBC # FLD: 12.9 % — SIGNIFICANT CHANGE UP (ref 10.3–14.5)
SODIUM SERPL-SCNC: 139 MMOL/L — SIGNIFICANT CHANGE UP (ref 135–145)
WBC # BLD: 9 K/UL — SIGNIFICANT CHANGE UP (ref 3.8–10.5)
WBC # FLD AUTO: 9 K/UL — SIGNIFICANT CHANGE UP (ref 3.8–10.5)

## 2017-09-26 PROCEDURE — 83690 ASSAY OF LIPASE: CPT

## 2017-09-26 PROCEDURE — A9537: CPT

## 2017-09-26 PROCEDURE — 76705 ECHO EXAM OF ABDOMEN: CPT

## 2017-09-26 PROCEDURE — 81001 URINALYSIS AUTO W/SCOPE: CPT

## 2017-09-26 PROCEDURE — 99284 EMERGENCY DEPT VISIT MOD MDM: CPT | Mod: 25

## 2017-09-26 PROCEDURE — 87086 URINE CULTURE/COLONY COUNT: CPT

## 2017-09-26 PROCEDURE — 36415 COLL VENOUS BLD VENIPUNCTURE: CPT

## 2017-09-26 PROCEDURE — 78226 HEPATOBILIARY SYSTEM IMAGING: CPT

## 2017-09-26 PROCEDURE — 72100 X-RAY EXAM L-S SPINE 2/3 VWS: CPT

## 2017-09-26 PROCEDURE — 86850 RBC ANTIBODY SCREEN: CPT

## 2017-09-26 PROCEDURE — 96372 THER/PROPH/DIAG INJ SC/IM: CPT | Mod: 59

## 2017-09-26 PROCEDURE — 84550 ASSAY OF BLOOD/URIC ACID: CPT

## 2017-09-26 PROCEDURE — 97116 GAIT TRAINING THERAPY: CPT

## 2017-09-26 PROCEDURE — 82803 BLOOD GASES ANY COMBINATION: CPT

## 2017-09-26 PROCEDURE — A9561: CPT

## 2017-09-26 PROCEDURE — 78306 BONE IMAGING WHOLE BODY: CPT

## 2017-09-26 PROCEDURE — 99285 EMERGENCY DEPT VISIT HI MDM: CPT | Mod: 25

## 2017-09-26 PROCEDURE — 96374 THER/PROPH/DIAG INJ IV PUSH: CPT

## 2017-09-26 PROCEDURE — 82150 ASSAY OF AMYLASE: CPT

## 2017-09-26 PROCEDURE — 71045 X-RAY EXAM CHEST 1 VIEW: CPT

## 2017-09-26 PROCEDURE — 97530 THERAPEUTIC ACTIVITIES: CPT

## 2017-09-26 PROCEDURE — 93005 ELECTROCARDIOGRAM TRACING: CPT

## 2017-09-26 PROCEDURE — 86901 BLOOD TYPING SEROLOGIC RH(D): CPT

## 2017-09-26 PROCEDURE — 96376 TX/PRO/DX INJ SAME DRUG ADON: CPT

## 2017-09-26 PROCEDURE — 80048 BASIC METABOLIC PNL TOTAL CA: CPT

## 2017-09-26 PROCEDURE — 78803 RP LOCLZJ TUM SPECT 1 AREA: CPT

## 2017-09-26 PROCEDURE — 72148 MRI LUMBAR SPINE W/O DYE: CPT

## 2017-09-26 PROCEDURE — 74181 MRI ABDOMEN W/O CONTRAST: CPT

## 2017-09-26 PROCEDURE — 97163 PT EVAL HIGH COMPLEX 45 MIN: CPT

## 2017-09-26 PROCEDURE — 94002 VENT MGMT INPAT INIT DAY: CPT

## 2017-09-26 PROCEDURE — 86900 BLOOD TYPING SEROLOGIC ABO: CPT

## 2017-09-26 PROCEDURE — 99233 SBSQ HOSP IP/OBS HIGH 50: CPT

## 2017-09-26 PROCEDURE — 96375 TX/PRO/DX INJ NEW DRUG ADDON: CPT

## 2017-09-26 PROCEDURE — 74176 CT ABD & PELVIS W/O CONTRAST: CPT

## 2017-09-26 PROCEDURE — 84100 ASSAY OF PHOSPHORUS: CPT

## 2017-09-26 PROCEDURE — 85027 COMPLETE CBC AUTOMATED: CPT

## 2017-09-26 PROCEDURE — C1889: CPT

## 2017-09-26 PROCEDURE — 83735 ASSAY OF MAGNESIUM: CPT

## 2017-09-26 PROCEDURE — 88304 TISSUE EXAM BY PATHOLOGIST: CPT

## 2017-09-26 PROCEDURE — 94003 VENT MGMT INPAT SUBQ DAY: CPT

## 2017-09-26 PROCEDURE — 80053 COMPREHEN METABOLIC PANEL: CPT

## 2017-09-26 PROCEDURE — 36600 WITHDRAWAL OF ARTERIAL BLOOD: CPT

## 2017-09-26 RX ORDER — OXYCODONE HYDROCHLORIDE 5 MG/1
1 TABLET ORAL
Qty: 0 | Refills: 0 | COMMUNITY
Start: 2017-09-26

## 2017-09-26 RX ORDER — MAGNESIUM OXIDE 400 MG ORAL TABLET 241.3 MG
1 TABLET ORAL
Qty: 0 | Refills: 0 | COMMUNITY
Start: 2017-09-26

## 2017-09-26 RX ORDER — LIDOCAINE 4 G/100G
1 CREAM TOPICAL
Qty: 0 | Refills: 0 | COMMUNITY
Start: 2017-09-26

## 2017-09-26 RX ORDER — MAGNESIUM OXIDE 400 MG ORAL TABLET 241.3 MG
400 TABLET ORAL
Qty: 0 | Refills: 0 | Status: DISCONTINUED | OUTPATIENT
Start: 2017-09-26 | End: 2017-09-26

## 2017-09-26 RX ORDER — POTASSIUM CHLORIDE 20 MEQ
40 PACKET (EA) ORAL ONCE
Qty: 0 | Refills: 0 | Status: COMPLETED | OUTPATIENT
Start: 2017-09-26 | End: 2017-09-26

## 2017-09-26 RX ORDER — HEPARIN SODIUM 5000 [USP'U]/ML
5000 INJECTION INTRAVENOUS; SUBCUTANEOUS
Qty: 0 | Refills: 0 | COMMUNITY
Start: 2017-09-26

## 2017-09-26 RX ORDER — OXYCODONE HYDROCHLORIDE 5 MG/1
10 TABLET ORAL EVERY 12 HOURS
Qty: 0 | Refills: 0 | Status: DISCONTINUED | OUTPATIENT
Start: 2017-09-26 | End: 2017-09-26

## 2017-09-26 RX ORDER — ACETAMINOPHEN 500 MG
2 TABLET ORAL
Qty: 0 | Refills: 0 | COMMUNITY
Start: 2017-09-26

## 2017-09-26 RX ADMIN — Medication 4 MILLIGRAM(S): at 05:39

## 2017-09-26 RX ADMIN — Medication 100 MILLIGRAM(S): at 05:38

## 2017-09-26 RX ADMIN — Medication 40 MILLIEQUIVALENT(S): at 12:25

## 2017-09-26 RX ADMIN — MAGNESIUM OXIDE 400 MG ORAL TABLET 400 MILLIGRAM(S): 241.3 TABLET ORAL at 12:25

## 2017-09-26 RX ADMIN — Medication 12.5 MICROGRAM(S): at 05:38

## 2017-09-26 RX ADMIN — LIDOCAINE 1 PATCH: 4 CREAM TOPICAL at 12:25

## 2017-09-26 RX ADMIN — PANTOPRAZOLE SODIUM 40 MILLIGRAM(S): 20 TABLET, DELAYED RELEASE ORAL at 05:40

## 2017-09-26 RX ADMIN — Medication 1 TABLET(S): at 12:25

## 2017-09-26 RX ADMIN — HEPARIN SODIUM 5000 UNIT(S): 5000 INJECTION INTRAVENOUS; SUBCUTANEOUS at 05:38

## 2017-09-26 RX ADMIN — HEPARIN SODIUM 5000 UNIT(S): 5000 INJECTION INTRAVENOUS; SUBCUTANEOUS at 15:09

## 2017-09-26 RX ADMIN — TRAMADOL HYDROCHLORIDE 25 MILLIGRAM(S): 50 TABLET ORAL at 12:15

## 2017-09-26 RX ADMIN — Medication 81 MILLIGRAM(S): at 12:25

## 2017-09-26 RX ADMIN — TRAMADOL HYDROCHLORIDE 25 MILLIGRAM(S): 50 TABLET ORAL at 04:25

## 2017-09-26 RX ADMIN — TRAMADOL HYDROCHLORIDE 25 MILLIGRAM(S): 50 TABLET ORAL at 09:54

## 2017-09-26 RX ADMIN — TRAMADOL HYDROCHLORIDE 25 MILLIGRAM(S): 50 TABLET ORAL at 03:52

## 2017-09-26 RX ADMIN — TRAMADOL HYDROCHLORIDE 25 MILLIGRAM(S): 50 TABLET ORAL at 16:17

## 2017-09-26 NOTE — DISCHARGE NOTE ADULT - SECONDARY DIAGNOSIS.
Acute bilateral low back pain without sciatica Constipation Diverticulitis Fall GERD (gastroesophageal reflux disease) Hypothyroid

## 2017-09-26 NOTE — DISCHARGE NOTE ADULT - NSTOBACCOHOTLINE_GEN_A_CS
[facility] Smokers Quitline ([phone]) [facility] Smokers Quitline ([phone])

## 2017-09-26 NOTE — PROGRESS NOTE ADULT - ASSESSMENT
Pt. well known to me with Acute cholecystitis, back pain following fall. Hx pancreatic lesion.  Stable postop Cholecystectomy.  Has compression fx L1, Disc herniation L3-4  Still some pain. Will need outpt. epidural.  Will be fitted for back brace. Should be ready for D/c

## 2017-09-26 NOTE — PROGRESS NOTE ADULT - ASSESSMENT
89yo female with anemia, constipation, diverticulitis, fall, GERD, hypothyroidism, osteoporosis, pelvic fracture presents with 3 day hx of right sided lower back pain, with cholecystitis based on HIDA/MRI, s/p cholecystectomy.    - Remained stable post cholecystectomy  - There is no evidence of acute ischemia.  - There is no evidence of significant arrhythmia  - There is no evidence for meaningful  volume overload.  - Exam and EKG do not suggest decompensated heart disease or significant valvular disease  - Continue with Aspirin 81 mg po daily  - BP has been elevated possibly from a combination of pain and use of steroids.  slightly better now at 140-150  - Pain control   - Bone scan reveals mild thoracolumbar scoliosis with associated degenerative joint changes in the spine and major joints.  - DVT prophylaxis  - Activity as tolerated  - Monitor electrolytes, keep k>4, Mg>2 repleted magnesium = ( 1.8 ) and potassium = (3.6)  - Conservative measures for compression fx - Ortho to fit pt for back brace.   - Her d/c plan is to rehab.  - Further cardiac workup as case unfolds  - Will continue to follow .    Bea Godfrey NP-C  Cardiology 89yo female with anemia, constipation, diverticulitis, fall, GERD, hypothyroidism, osteoporosis, pelvic fracture presents with 3 day hx of right sided lower back pain, with cholecystitis based on HIDA/MRI, s/p cholecystectomy.    - Remained stable post cholecystectomy  - There is no evidence of acute ischemia.  - There is no evidence of significant arrhythmia  - There is no evidence for meaningful  volume overload.  - Exam and EKG do not suggest decompensated heart disease or significant valvular disease  - Continue with Aspirin 81 mg po daily  - BP is now controlled  - Pain control   - Activity as tolerated  - Monitor electrolytes, keep k>4, Mg>2 repleted magnesium = ( 1.8 ) and potassium = (3.6)  - Will continue to follow .

## 2017-09-26 NOTE — DISCHARGE NOTE ADULT - PATIENT PORTAL LINK FT
“You can access the [employer] Patient Portal, offered by [facility], by registering with the following website: [email or web address]”

## 2017-09-26 NOTE — PROGRESS NOTE ADULT - PROBLEM SELECTOR PROBLEM 4
Preventive measure
Abdominal pain
Preventive measure

## 2017-09-26 NOTE — PROGRESS NOTE ADULT - SUBJECTIVE AND OBJECTIVE BOX
PULMONARY/CRITICAL CARE      INTERVAL HPI/OVERNIGHT EVENTS:  Ambulated. Less back, abd pain. Had BM  89y FemaleHPI:  89 yo white female with few days of positional low bilateral back pain. Seen here yesterday and had ct scan which revealed marled degenerative  changes and stool. Ultram was prescribed but patient then developed worsening nausea and then occasional vomiting. Feels weaker today. Poor oral intake. Mild right sided abdominal pains x few days. No frequency or dysuria.  In ER patient was found to have RUQ tender with + escamilla's sign.  Patient is being admitted for further work up and treatment. (19 Sep 2017 16:45)        PAST MEDICAL & SURGICAL HISTORY:  Diverticulitis  Pelvic fracture  Fall  Anemia  Constipation  Osteoporosis  Diverticulitis  GERD (gastroesophageal reflux disease)  Hypothyroid  Reflux  Bronchitis  S/P small bowel resection  Ovarian cyst: right  S/P appendectomy  Hip fracture requiring operative repair: right        ICU Vital Signs Last 24 Hrs  T(C): 36.5 (26 Sep 2017 07:56), Max: 36.6 (25 Sep 2017 17:03)  T(F): 97.7 (26 Sep 2017 07:56), Max: 97.8 (25 Sep 2017 17:03)  HR: 75 (26 Sep 2017 07:56) (74 - 82)  BP: 117/74 (26 Sep 2017 07:56) (117/74 - 152/95)  BP(mean): --  ABP: --  ABP(mean): --  RR: 16 (26 Sep 2017 07:56) (16 - 16)  SpO2: 91% (26 Sep 2017 07:56) (91% - 92%)    Qtts:     I&O's Summary    25 Sep 2017 07:01  -  26 Sep 2017 07:00  --------------------------------------------------------  IN: 240 mL / OUT: 0 mL / NET: 240 mL    REVIEW OF SYSTEMS:    CONSTITUTIONAL: No fever, weight loss, or fatigue  EYES: No eye pain, visual disturbances, or discharge  ENMT:  No difficulty hearing, tinnitus, vertigo; No sinus or throat pain  NECK: No pain or stiffness  BREASTS: No pain, masses, or nipple discharge  RESPIRATORY: No cough, wheezing, chills or hemoptysis; No shortness of breath  CARDIOVASCULAR: No chest pain, palpitations, dizziness, or leg swelling  GASTROINTESTINAL: Mild  abdominal  pain. No nausea, vomiting, or hematemesis; No diarrhea or constipation. No melena or hematochezia.  GENITOURINARY: No dysuria, frequency, hematuria, or incontinence  NEUROLOGICAL: No headaches, memory loss, loss of strength, numbness, or tremors  SKIN: No itching, burning, rashes, or lesions   LYMPH NODES: No enlarged glands  ENDOCRINE: No heat or cold intolerance; No hair loss  MUSCULOSKELETAL: No joint pain or swelling; No muscle,or extremity pain, no calf tenderness  Has low back pain.  PSYCHIATRIC: No depression, anxiety, mood swings, or difficulty sleeping  HEME/LYMPH: No easy bruising, or bleeding gums  ALLERGY AND IMMUNOLOGIC: No hives or eczema      PHYSICAL EXAM:    GENERAL: NAD, well-groomed, well-developed, NAD  HEAD:  Atraumatic, Normocephalic  EYES: EOMI, PERRLA, conjunctiva and sclera clear  ENMT: No tonsillar erythema, exudates, or enlargement; Moist mucous membranes, Good dentition, No lesions  NECK: Supple, No JVD, Normal thyroid  NERVOUS SYSTEM:  Alert & Oriented X3, Good concentration; Motor Strength 5/5 B/L upper and lower extremities  CHEST/LUNG: Clear to percussion bilaterally; No rales, rhonchi, wheezing, or rubs  HEART: Regular rate and rhythm; No murmurs, rubs, or gallops  ABDOMEN: Soft, Mildly tender, Nondistended; Bowel sounds present  EXTREMITIES:  2+ Peripheral Pulses, No clubbing, cyanosis, or edema  LYMPH: No lymphadenopathy noted  SKIN: No rashes or lesions                LABS:                        10.4   9.0   )-----------( 264      ( 26 Sep 2017 06:42 )             30.1     09-26    139  |  100  |  17  ----------------------------<  91  3.6   |  31  |  0.64    Ca    9.0      26 Sep 2017 06:42  Phos  3.7     09-26  Mg     1.8     09-26    TPro  5.5<L>  /  Alb  2.5<L>  /  TBili  0.5  /  DBili  x   /  AST  45<H>  /  ALT  59  /  AlkPhos  58  09-26          vanco through     RADIOLOGY & ADDITIONAL STUDIES:  Bone scan neg.      CRITICAL CARE TIME SPENT:

## 2017-09-26 NOTE — PROGRESS NOTE ADULT - PROVIDER SPECIALTY LIST ADULT
Anesthesia
Anesthesia
Cardiology
Critical Care
Hospitalist
Internal Medicine
Nephrology
Neurology
Pulmonology
Pulmonology
Surgery
Internal Medicine
Pulmonology

## 2017-09-26 NOTE — PROGRESS NOTE ADULT - SUBJECTIVE AND OBJECTIVE BOX
HPI:  89 yo white female with few days of positional low bilateral back pain. Seen here yesterday and had ct scan which revealed marled degenerative  changes and stool. Ultram was prescribed but patient then developed worsening nausea and then occasional vomiting. Feels weaker today. Poor oral intake. Mild right sided abdominal pains x few days. No frequency or dysuria.  In ER patient was found to have RUQ tender with + escamilal's sign.  Patient is being admitted for further work up and treatment. (19 Sep 2017 16:45)      SUBJECTIVE:  Patient is a 89y old  Female who presents with a chief complaint of abd pain (19 Sep 2017 16:45)          OBJECTIVE:  Review Of Systems:  Constitutional: [ ] Fever [ ] Chills [ ] Fatigue [ ] Weight change   HEENT: [ ] Blurred vision [ ] Eye Pain [ ] Headache [ ] Runny nose [ ] Sore Throat   Respiratory: [ ] Cough [ ] Wheezing [ ] Shortness of breath  Cardiovascular: [ ] Chest Pain [ ] Palpitations [ ] BENNETT [ ] PND [ ] Orthopnea  Gastrointestinal: [x ] Abdominal Incisional discomfort [ ] Diarrhea [ ] Constipation [ ] Hemorrhoids [ ] Nausea [ ] Vomiting  Genitourinary: [ ] Nocturia [ ] Dysuria [ ] Incontinence  Extremities: [ ] Swelling [x ] back/Joint Pain  Neurologic: [ ] Focal deficit [ ] Paresthesias [ ] Syncope  Lymphatic: [ ] Swelling [ ] Lymphadenopathy   Skin: [ ] Rash [ ] Ecchymoses [x ] incisional scope sites healing [ ] Lesions  Psychiatry: [ ] Depression [ ] Suicidal/Homicidal Ideation [ ] Anxiety [ ] Sleep Disturbances  [x ] 10 point review of systems is otherwise negative except as mentioned above            [ ]Unable to obtain    Allergy:  Allergies    No Known Allergies    Intolerances        Medications:  MEDICATIONS  (STANDING):  influenza   Vaccine 0.5 milliLiter(s) IntraMuscular once  aspirin enteric coated 81 milliGRAM(s) Oral daily  lidocaine   Patch 1 Patch Transdermal daily  calcium carbonate  625 mG + Vitamin D (OsCal 250 + D) 1 Tablet(s) Oral daily  levothyroxine 12.5 MICROGram(s) Oral daily  heparin  Injectable 5000 Unit(s) SubCutaneous every 8 hours  pantoprazole    Tablet 40 milliGRAM(s) Oral before breakfast  methylPREDNISolone   Oral   methylPREDNISolone 4 milliGRAM(s) Oral before breakfast  methylPREDNISolone 4 milliGRAM(s) Oral at bedtime  docusate sodium 100 milliGRAM(s) Oral two times a day    MEDICATIONS  (PRN):  acetaminophen   Tablet. 650 milliGRAM(s) Oral every 6 hours PRN Mild Pain (1 - 3)  traMADol 25 milliGRAM(s) Oral four times a day PRN Moderate Pain (4 - 6)  oxyCODONE    IR 5 milliGRAM(s) Oral four times a day PRN Severe Pain (7 - 10)  ketorolac   Injectable 15 milliGRAM(s) IV Push every 8 hours PRN back pain      PMH/PSH/FH/SH: [ ] Unchanged    Vitals:  T(C): 36.5 (09-26-17 @ 07:56), Max: 36.6 (09-25-17 @ 17:03)  HR: 75 (09-26-17 @ 07:56) (74 - 82)  BP: 117/74 (09-26-17 @ 07:56) (117/74 - 152/95)  BP(mean): --  RR: 16 (09-26-17 @ 07:56) (16 - 16)  SpO2: 91% (09-26-17 @ 07:56) (91% - 92%)  Wt(kg): --  Daily     Daily   I&O's Summary    25 Sep 2017 07:01  -  26 Sep 2017 07:00  --------------------------------------------------------  IN: 240 mL / OUT: 0 mL / NET: 240 mL        Labs:                        10.4   9.0   )-----------( 264      ( 26 Sep 2017 06:42 )             30.1     09-26    139  |  100  |  17  ----------------------------<  91  3.6   |  31  |  0.64    Ca    9.0      26 Sep 2017 06:42  Phos  3.7     09-26  Mg     1.8     09-26    TPro  5.5<L>  /  Alb  2.5<L>  /  TBili  0.5  /  DBili  x   /  AST  45<H>  /  ALT  59  /  AlkPhos  58  09-26          Magnesium, Serum: 1.8 mg/dL (09-26 @ 06:42)  Phosphorus Level, Serum: 3.7 mg/dL (09-26 @ 06:42)              ECG:   < from: 12 Lead ECG (09.20.17 @ 08:14) >  Diagnosis Line Normal sinus rhythm  Right bundle branch block  Abnormal ECG    < end of copied text >      Echo:        Stress Testing:     Cath:    Imaging:  < from: NM SPECT Bone Scan (09.25.17 @ 16:03) >  IMPRESSION: Essentially normal bone scan.    Mild thoracolumbar scoliosis with associated degenerative changes in the   spine.    Degenerative changes in the major joints.      < end of copied text >      Interpretation of Telemetry:      Physical Exam:  Appearance: [ ] Normal  [ ] abnormal [ x] NAD lying in bed awake alert  Eyes: [x ] PERRL [ ] EOMI  HENT: [x ] Normal [ ] Abnormal oral mus[place]a [ ]NC/AT  Cardiovascular: [x ] S1 [x ] S2 [x ] RRR [ ]  m/r/g [ ]edema [ ] JVP  Respiratory: [ ] Clear to auscultation bilaterally  Gastrointestinal: [x] Soft [x ] slight tenderness to touch [x ] slightly distended [x ] BS [x ] Steri strips intact  Musculoskeletal: [ ] clubbing [ ] joint deformity   Neurologic: [x ] Non-focal  Lymphatic: [ ] lymphadenopathy  Psychiatry: [x ] AAOx3  [ ] confused [ ] disoriented [ x] Mood & affect appropriate  Skin: [ ]  rashes [ ] old ecchymoses around scope sites on abdomen [ ] cyanosis

## 2017-09-26 NOTE — PROGRESS NOTE ADULT - PROBLEM SELECTOR PROBLEM 1
Abdominal pain
Acute cholecystitis
Abdominal pain

## 2017-09-26 NOTE — PROGRESS NOTE ADULT - PROBLEM SELECTOR PROBLEM 2
Acute bilateral low back pain without sciatica
Back pain
Acute bilateral low back pain without sciatica
Back pain

## 2017-09-26 NOTE — DISCHARGE NOTE ADULT - MEDICATION SUMMARY - MEDICATIONS TO TAKE
I will START or STAY ON the medications listed below when I get home from the hospital:    TLSO  -- L1 VCF  ICD: S32.010B  [place] 99  -- Indication: For =    Medrol Dosepak 4 mg oral tablet  -- 1 packet(s) by mouth once  dispense 1 packet  take as directed  -- Indication: For =    acetaminophen 325 mg oral tablet  -- 2 tab(s) by mouth every 6 hours, As needed, Mild Pain (1 - 3)  -- Indication: For =    oxyCODONE 5 mg oral tablet  -- 1 tab(s) by mouth 4 times a day, As needed, Severe Pain (7 - 10)  -- Indication: For =    oxyCODONE 10 mg oral tablet, extended release  -- 1 tab(s) by mouth every 12 hours  -- Indication: For =    Aspirin Enteric Coated 81 mg oral delayed release tablet  -- 1 tab(s) by mouth once a day  -- Indication: For =    Ultram 50 mg oral tablet  -- 1 tab(s) by mouth every 6 hours, As Needed MDD:6  -- May cause drowsiness.  Alcohol may intensify this effect.  Use care when operating dangerous machinery.  Obtain medical advice before taking any non-prescription drugs as some may affect the action of this medication.      -- Indication: For =    heparin  -- 5000 unit(s) subcutaneous 2 times a day  -- Indication: For =    lidocaine 5% topical film  -- Apply on skin to affected area once a day  -- Indication: For =    Slow Fe (as elemental iron) 45 mg oral tablet, extended release  -- 1 tab(s) by mouth once a day  -- Indication: For =    Colace 100 mg oral capsule  -- 1 cap(s) by mouth 3 times a day  -- Indication: For =    MiraLax - oral powder for reconstitution  -- 17 milligram(s) by mouth once a day, As Needed  -- Indication: For =    magnesium oxide 400 mg (241.3 mg elemental magnesium) oral tablet  -- 1 tab(s) by mouth 3 times a day (with meals)  -- Indication: For =    omeprazole 20 mg oral delayed release capsule  -- 1 cap(s) by mouth 2 times a day, one in the morning and one at 1 PM  -- Indication: For =    levothyroxine  -- 12.5 microgram(s) by mouth once a day  -- Indication: For =    Calcium 600+D  -- 1 tab(s) by mouth once a day  -- Indication: For =    Vitamin B Complex 100  -- 1 tab(s) by mouth once a day  -- Indication: For =    PreserVision Antioxidant Multiple Vitamins and Minerals oral capsule  -- 1 cap(s) by mouth 2 times a day  -- vit A  -- Indication: For =    Vitamin B12  -- 1 tab(s) by mouth once a day  -- Indication: For =    Vitamin D3 2000 intl units oral capsule  -- 1 cap(s) by mouth once a day  -- Indication: For =    folic acid 0.4 mg oral tablet  -- 1 tab(s) by mouth once a day  -- Indication: For =

## 2017-09-26 NOTE — DISCHARGE NOTE ADULT - CARE PROVIDER_API CALL
Jeanmarie Mendez (), [place] [address]  Phone: [phone]  Fax: [phone]    Vamshi Bryan), Critical Care Medicine; Internal Medicine; Pulmonary Disease  [address] [place] [address]  Phone: [phone]  Fax: [phone]    Marco Tsang), Orthopaedic Surgery; Surgery of the Hand  [address] [place] [address]  Phone: [phone]  Fax: [phone]

## 2017-09-26 NOTE — PROGRESS NOTE ADULT - PROBLEM SELECTOR PROBLEM 3
Vomiting
Hypothyroid
Vomiting
Hypothyroid

## 2017-09-26 NOTE — PROGRESS NOTE ADULT - PROBLEM SELECTOR PLAN 2
check L/S spine xray, and MRI, add bone scan  neuro eval with Dr. JONES / Dr. RIOS appreciated  add lidocaine patch
Dilaudid prn  PT  Toradol requested by pt.  Ortho eval  Pain eval Dr. Norris as outpt.
L/S spine xray, MRI, bone scan noted  neuro eval with Dr. JONES / Dr. RIOS appreciated  add lidocaine patch  to get LSO brace  D/C planning
check L/S spine xray  neuro eval with Dr. JONES  add lidocaine patch
check L/S spine xray  neuro eval with Dr. JONES  add lidocaine patch
check L/S spine xray, add MRI  neuro eval with Dr. JONES / Dr. RIOS  add lidocaine patch
check L/S spine xray, and MRI, add bone scan  neuro eval with Dr. JONES / Dr. RIOS appreciated  add lidocaine patch
Dilaudid prn
Dilaudid prn  PT
Dilaudid prn  PT  Toradol
Dilaudid prn  PT  Toradol  Ortho eval  Pain eval Dr. Norris as outpt.
Dilaudid prn  PT  Toradol requested by pt.  Ortho eval  Pain eval Dr. Norris as outpt.
Dilaudid prn  PT  Toradol requested by pt.  Ortho eval  Pain eval Dr. Norris as outpt.

## 2017-09-26 NOTE — DISCHARGE NOTE ADULT - HOSPITAL COURSE
admitted for acute cholecystitis  underwent lap sandoval  also has back pain  found to have VCF on MRI / bone scan  TLSO brace per ortho  Dc after surgery and PT clearance

## 2017-09-26 NOTE — DISCHARGE NOTE ADULT - CARE PLAN
Principal Discharge DX:	Acute cholecystitis  Goal:	free from abdominal pain  Instructions for follow-up, activity and diet:	follow up with surgeon Dr. CRAIG  Secondary Diagnosis:	Acute bilateral low back pain without sciatica  Secondary Diagnosis:	Constipation  Secondary Diagnosis:	Diverticulitis  Secondary Diagnosis:	Fall  Secondary Diagnosis:	GERD (gastroesophageal reflux disease)  Secondary Diagnosis:	Hypothyroid

## 2017-09-26 NOTE — PROGRESS NOTE ADULT - NSHPATTENDINGPLANDISCUSS_GEN_ALL_CORE
dtr
richard in detail.
Dr. Ferrell and richard in detail.
dtr and intern in detail.
richard in detail.
patient and RN
patient and dtr at bedside
patient and dtr at bedside
patient and family at bedside
dtr

## 2017-09-28 DIAGNOSIS — M54.31 SCIATICA, RIGHT SIDE: ICD-10-CM

## 2017-09-28 DIAGNOSIS — K21.9 GASTRO-ESOPHAGEAL REFLUX DISEASE WITHOUT ESOPHAGITIS: ICD-10-CM

## 2017-09-28 DIAGNOSIS — M81.0 AGE-RELATED OSTEOPOROSIS WITHOUT CURRENT PATHOLOGICAL FRACTURE: ICD-10-CM

## 2017-09-28 DIAGNOSIS — E87.1 HYPO-OSMOLALITY AND HYPONATREMIA: ICD-10-CM

## 2017-09-28 DIAGNOSIS — M54.16 RADICULOPATHY, LUMBAR REGION: ICD-10-CM

## 2017-09-28 DIAGNOSIS — E03.9 HYPOTHYROIDISM, UNSPECIFIED: ICD-10-CM

## 2017-09-28 DIAGNOSIS — K57.92 DIVERTICULITIS OF INTESTINE, PART UNSPECIFIED, WITHOUT PERFORATION OR ABSCESS WITHOUT BLEEDING: ICD-10-CM

## 2017-09-28 DIAGNOSIS — M51.26 OTHER INTERVERTEBRAL DISC DISPLACEMENT, LUMBAR REGION: ICD-10-CM

## 2017-09-28 DIAGNOSIS — M54.32 SCIATICA, LEFT SIDE: ICD-10-CM

## 2017-09-28 DIAGNOSIS — D64.9 ANEMIA, UNSPECIFIED: ICD-10-CM

## 2017-09-28 DIAGNOSIS — M41.9 SCOLIOSIS, UNSPECIFIED: ICD-10-CM

## 2017-09-28 DIAGNOSIS — R11.10 VOMITING, UNSPECIFIED: ICD-10-CM

## 2017-09-28 DIAGNOSIS — M48.56XA COLLAPSED VERTEBRA, NOT ELSEWHERE CLASSIFIED, LUMBAR REGION, INITIAL ENCOUNTER FOR FRACTURE: ICD-10-CM

## 2017-09-28 DIAGNOSIS — R93.7 ABNORMAL FINDINGS ON DIAGNOSTIC IMAGING OF OTHER PARTS OF MUSCULOSKELETAL SYSTEM: ICD-10-CM

## 2017-09-28 DIAGNOSIS — K81.0 ACUTE CHOLECYSTITIS: ICD-10-CM

## 2017-10-03 DIAGNOSIS — E83.39 OTHER DISORDERS OF PHOSPHORUS METABOLISM: ICD-10-CM

## 2017-10-03 DIAGNOSIS — Z79.82 LONG TERM (CURRENT) USE OF ASPIRIN: ICD-10-CM

## 2017-10-03 DIAGNOSIS — Z91.81 HISTORY OF FALLING: ICD-10-CM

## 2017-10-03 DIAGNOSIS — M51.16 INTERVERTEBRAL DISC DISORDERS WITH RADICULOPATHY, LUMBAR REGION: ICD-10-CM

## 2017-10-04 ENCOUNTER — TRANSCRIPTION ENCOUNTER (OUTPATIENT)
Age: 82
End: 2017-10-04

## 2017-12-05 ENCOUNTER — OUTPATIENT (OUTPATIENT)
Dept: OUTPATIENT SERVICES | Facility: HOSPITAL | Age: 82
LOS: 1 days | End: 2017-12-05
Payer: MEDICARE

## 2017-12-05 ENCOUNTER — APPOINTMENT (OUTPATIENT)
Dept: ULTRASOUND IMAGING | Facility: CLINIC | Age: 82
End: 2017-12-05
Payer: MEDICARE

## 2017-12-05 DIAGNOSIS — Z00.8 ENCOUNTER FOR OTHER GENERAL EXAMINATION: ICD-10-CM

## 2017-12-05 PROBLEM — Z00.00 ENCOUNTER FOR PREVENTIVE HEALTH EXAMINATION: Noted: 2017-12-05

## 2017-12-05 PROCEDURE — 93970 EXTREMITY STUDY: CPT | Mod: 26

## 2017-12-05 PROCEDURE — 93970 EXTREMITY STUDY: CPT

## 2018-03-17 ENCOUNTER — EMERGENCY (EMERGENCY)
Facility: HOSPITAL | Age: 83
LOS: 1 days | Discharge: ANOTHER HOSPITAL | End: 2018-03-17
Attending: EMERGENCY MEDICINE | Admitting: EMERGENCY MEDICINE
Payer: MEDICARE

## 2018-03-17 VITALS
OXYGEN SATURATION: 87 % | HEIGHT: 58 IN | RESPIRATION RATE: 20 BRPM | SYSTOLIC BLOOD PRESSURE: 208 MMHG | TEMPERATURE: 98 F | HEART RATE: 85 BPM | WEIGHT: 106.04 LBS | DIASTOLIC BLOOD PRESSURE: 113 MMHG

## 2018-03-17 VITALS
RESPIRATION RATE: 16 BRPM | SYSTOLIC BLOOD PRESSURE: 142 MMHG | HEART RATE: 92 BPM | DIASTOLIC BLOOD PRESSURE: 75 MMHG | OXYGEN SATURATION: 95 % | TEMPERATURE: 98 F

## 2018-03-17 LAB
ALBUMIN SERPL ELPH-MCNC: 3.9 G/DL — SIGNIFICANT CHANGE UP (ref 3.3–5)
ALP SERPL-CCNC: 75 U/L — SIGNIFICANT CHANGE UP (ref 40–120)
ALT FLD-CCNC: 23 U/L — SIGNIFICANT CHANGE UP (ref 12–78)
ANION GAP SERPL CALC-SCNC: 12 MMOL/L — SIGNIFICANT CHANGE UP (ref 5–17)
APTT BLD: 27.8 SEC — SIGNIFICANT CHANGE UP (ref 27.5–37.4)
AST SERPL-CCNC: 28 U/L — SIGNIFICANT CHANGE UP (ref 15–37)
BASOPHILS # BLD AUTO: 0.1 K/UL — SIGNIFICANT CHANGE UP (ref 0–0.2)
BASOPHILS NFR BLD AUTO: 0.5 % — SIGNIFICANT CHANGE UP (ref 0–2)
BILIRUB SERPL-MCNC: 0.5 MG/DL — SIGNIFICANT CHANGE UP (ref 0.2–1.2)
BUN SERPL-MCNC: 20 MG/DL — SIGNIFICANT CHANGE UP (ref 7–23)
CALCIUM SERPL-MCNC: 9.5 MG/DL — SIGNIFICANT CHANGE UP (ref 8.5–10.1)
CHLORIDE SERPL-SCNC: 104 MMOL/L — SIGNIFICANT CHANGE UP (ref 96–108)
CO2 SERPL-SCNC: 23 MMOL/L — SIGNIFICANT CHANGE UP (ref 22–31)
CREAT SERPL-MCNC: 0.69 MG/DL — SIGNIFICANT CHANGE UP (ref 0.5–1.3)
EOSINOPHIL # BLD AUTO: 0.1 K/UL — SIGNIFICANT CHANGE UP (ref 0–0.5)
EOSINOPHIL NFR BLD AUTO: 1.2 % — SIGNIFICANT CHANGE UP (ref 0–6)
GLUCOSE SERPL-MCNC: 141 MG/DL — HIGH (ref 70–99)
HCT VFR BLD CALC: 37.9 % — SIGNIFICANT CHANGE UP (ref 34.5–45)
HGB BLD-MCNC: 12.6 G/DL — SIGNIFICANT CHANGE UP (ref 11.5–15.5)
INR BLD: 0.99 RATIO — SIGNIFICANT CHANGE UP (ref 0.88–1.16)
LYMPHOCYTES # BLD AUTO: 1.7 K/UL — SIGNIFICANT CHANGE UP (ref 1–3.3)
LYMPHOCYTES # BLD AUTO: 15.3 % — SIGNIFICANT CHANGE UP (ref 13–44)
MCHC RBC-ENTMCNC: 31.6 PG — SIGNIFICANT CHANGE UP (ref 27–34)
MCHC RBC-ENTMCNC: 33.2 GM/DL — SIGNIFICANT CHANGE UP (ref 32–36)
MCV RBC AUTO: 95.3 FL — SIGNIFICANT CHANGE UP (ref 80–100)
MONOCYTES # BLD AUTO: 0.6 K/UL — SIGNIFICANT CHANGE UP (ref 0–0.9)
MONOCYTES NFR BLD AUTO: 5 % — SIGNIFICANT CHANGE UP (ref 1–9)
NEUTROPHILS # BLD AUTO: 8.7 K/UL — HIGH (ref 1.8–7.4)
NEUTROPHILS NFR BLD AUTO: 78 % — HIGH (ref 43–77)
PLATELET # BLD AUTO: 216 K/UL — SIGNIFICANT CHANGE UP (ref 150–400)
POTASSIUM SERPL-MCNC: 3.6 MMOL/L — SIGNIFICANT CHANGE UP (ref 3.5–5.3)
POTASSIUM SERPL-SCNC: 3.6 MMOL/L — SIGNIFICANT CHANGE UP (ref 3.5–5.3)
PROT SERPL-MCNC: 7.6 G/DL — SIGNIFICANT CHANGE UP (ref 6–8.3)
PROTHROM AB SERPL-ACNC: 10.8 SEC — SIGNIFICANT CHANGE UP (ref 9.8–12.7)
RBC # BLD: 3.98 M/UL — SIGNIFICANT CHANGE UP (ref 3.8–5.2)
RBC # FLD: 12.7 % — SIGNIFICANT CHANGE UP (ref 10.3–14.5)
SODIUM SERPL-SCNC: 139 MMOL/L — SIGNIFICANT CHANGE UP (ref 135–145)
WBC # BLD: 11.2 K/UL — HIGH (ref 3.8–10.5)
WBC # FLD AUTO: 11.2 K/UL — HIGH (ref 3.8–10.5)

## 2018-03-17 PROCEDURE — 71260 CT THORAX DX C+: CPT | Mod: 26

## 2018-03-17 PROCEDURE — 96376 TX/PRO/DX INJ SAME DRUG ADON: CPT

## 2018-03-17 PROCEDURE — 72125 CT NECK SPINE W/O DYE: CPT | Mod: 26

## 2018-03-17 PROCEDURE — 96374 THER/PROPH/DIAG INJ IV PUSH: CPT | Mod: 59

## 2018-03-17 PROCEDURE — 83880 ASSAY OF NATRIURETIC PEPTIDE: CPT

## 2018-03-17 PROCEDURE — 36415 COLL VENOUS BLD VENIPUNCTURE: CPT

## 2018-03-17 PROCEDURE — 74177 CT ABD & PELVIS W/CONTRAST: CPT | Mod: 26

## 2018-03-17 PROCEDURE — 86900 BLOOD TYPING SEROLOGIC ABO: CPT

## 2018-03-17 PROCEDURE — 84484 ASSAY OF TROPONIN QUANT: CPT

## 2018-03-17 PROCEDURE — 71045 X-RAY EXAM CHEST 1 VIEW: CPT

## 2018-03-17 PROCEDURE — 85027 COMPLETE CBC AUTOMATED: CPT

## 2018-03-17 PROCEDURE — 86901 BLOOD TYPING SEROLOGIC RH(D): CPT

## 2018-03-17 PROCEDURE — 74177 CT ABD & PELVIS W/CONTRAST: CPT

## 2018-03-17 PROCEDURE — 93005 ELECTROCARDIOGRAM TRACING: CPT

## 2018-03-17 PROCEDURE — 82550 ASSAY OF CK (CPK): CPT

## 2018-03-17 PROCEDURE — 70450 CT HEAD/BRAIN W/O DYE: CPT | Mod: 26

## 2018-03-17 PROCEDURE — 86850 RBC ANTIBODY SCREEN: CPT

## 2018-03-17 PROCEDURE — 99285 EMERGENCY DEPT VISIT HI MDM: CPT

## 2018-03-17 PROCEDURE — 71045 X-RAY EXAM CHEST 1 VIEW: CPT | Mod: 26

## 2018-03-17 PROCEDURE — 96375 TX/PRO/DX INJ NEW DRUG ADDON: CPT

## 2018-03-17 PROCEDURE — 72125 CT NECK SPINE W/O DYE: CPT

## 2018-03-17 PROCEDURE — 82553 CREATINE MB FRACTION: CPT

## 2018-03-17 PROCEDURE — 99285 EMERGENCY DEPT VISIT HI MDM: CPT | Mod: 25

## 2018-03-17 PROCEDURE — 80053 COMPREHEN METABOLIC PANEL: CPT

## 2018-03-17 PROCEDURE — 85730 THROMBOPLASTIN TIME PARTIAL: CPT

## 2018-03-17 PROCEDURE — 70450 CT HEAD/BRAIN W/O DYE: CPT

## 2018-03-17 PROCEDURE — 85610 PROTHROMBIN TIME: CPT

## 2018-03-17 PROCEDURE — 71260 CT THORAX DX C+: CPT

## 2018-03-17 RX ORDER — ONDANSETRON 8 MG/1
4 TABLET, FILM COATED ORAL ONCE
Qty: 0 | Refills: 0 | Status: COMPLETED | OUTPATIENT
Start: 2018-03-17 | End: 2018-03-17

## 2018-03-17 RX ORDER — MORPHINE SULFATE 50 MG/1
4 CAPSULE, EXTENDED RELEASE ORAL ONCE
Qty: 0 | Refills: 0 | Status: DISCONTINUED | OUTPATIENT
Start: 2018-03-17 | End: 2018-03-17

## 2018-03-17 RX ORDER — LEVOTHYROXINE SODIUM 125 MCG
1 TABLET ORAL
Qty: 0 | Refills: 0 | COMMUNITY

## 2018-03-17 RX ORDER — POLYETHYLENE GLYCOL 3350 17 G/17G
17 POWDER, FOR SOLUTION ORAL
Qty: 0 | Refills: 0 | COMMUNITY

## 2018-03-17 RX ORDER — MORPHINE SULFATE 50 MG/1
2 CAPSULE, EXTENDED RELEASE ORAL ONCE
Qty: 0 | Refills: 0 | Status: DISCONTINUED | OUTPATIENT
Start: 2018-03-17 | End: 2018-03-17

## 2018-03-17 RX ADMIN — ONDANSETRON 4 MILLIGRAM(S): 8 TABLET, FILM COATED ORAL at 23:02

## 2018-03-17 RX ADMIN — MORPHINE SULFATE 4 MILLIGRAM(S): 50 CAPSULE, EXTENDED RELEASE ORAL at 21:31

## 2018-03-17 RX ADMIN — MORPHINE SULFATE 2 MILLIGRAM(S): 50 CAPSULE, EXTENDED RELEASE ORAL at 23:25

## 2018-03-17 RX ADMIN — MORPHINE SULFATE 4 MILLIGRAM(S): 50 CAPSULE, EXTENDED RELEASE ORAL at 20:37

## 2018-03-17 RX ADMIN — MORPHINE SULFATE 4 MILLIGRAM(S): 50 CAPSULE, EXTENDED RELEASE ORAL at 22:02

## 2018-03-17 NOTE — ED ADULT NURSE NOTE - CHPI ED SYMPTOMS NEG
no bleeding/no confusion/no deformity/no tingling/no abrasion/no numbness/no fever/no weakness/no vomiting

## 2018-03-17 NOTE — ED ADULT NURSE NOTE - OBJECTIVE STATEMENT
89 year old female brought in by EMS from home status post fall. As per family patient was sitting in her chair, got up to go to the bathroom and lost her balance. Patient fell forwards, hitting her chest and face on the floor. EMS was called, C-Collar placed. On arrival to ED patient is complaining of chest/sternum pain. Patient denies shortness of breath but presents 87% spO2 on room air. Patient is alert and oriented, no mental changes. Denies any other injury or complaints of pain. Patient moving all extremities, no numbness/tingling. No laceration or wounds sustained. No loss of consciousness.

## 2018-03-17 NOTE — ED PROVIDER NOTE - MEDICAL DECISION MAKING DETAILS
trauma CT scan, bloodwork, IV analgesia trauma CT scan, bloodwork, IV analgesia, keep c-collar in place

## 2018-03-17 NOTE — ED ADULT TRIAGE NOTE - CHIEF COMPLAINT QUOTE
as per family patient was sitting on a wheel chair, bend over , lost balance and fell, c/o sternal pain, head and neck pain., c-collar placed by ems

## 2018-03-17 NOTE — ED ADULT NURSE NOTE - PMH
Anemia    Bronchitis    Constipation    Diverticulitis    Diverticulitis    Fall    GERD (gastroesophageal reflux disease)    Hypothyroid    Osteoporosis    Pelvic fracture    Reflux

## 2018-03-17 NOTE — ED PROVIDER NOTE - PROGRESS NOTE DETAILS
discussed case with Dr. Sutton will accept transfer to [place] discussed case with Dr. Sutton will accept transfer to [place].  Dr. Luis will accept in ED

## 2018-03-17 NOTE — ED PROVIDER NOTE - CARE PLAN
Principal Discharge DX:	Fall, initial encounter  Secondary Diagnosis:	Chest wall pain Principal Discharge DX:	Dens fracture, closed, initial encounter  Secondary Diagnosis:	Chest wall pain  Secondary Diagnosis:	Closed fracture of sternum, unspecified portion of sternum, initial encounter

## 2018-03-17 NOTE — ED ADULT NURSE NOTE - ED STAT RN HANDOFF DETAILS 3
Report given to [facility], bedside for transport to [facility]

## 2018-03-17 NOTE — ED PROVIDER NOTE - OBJECTIVE STATEMENT
89 yo female hx of anemia, GERD, s/p fall while trying to put on her shoe out of chair today, lost her balance and fell forward hitting the left side of her head and chest wall c/o chest wall pain with pain on inspiration and neck pain.  No nausea/vomiting.  No other joint pain.  PMD Dr. Bryan. 89 yo female hx of anemia, GERD, s/p fall while trying to put on her shoe out of chair today, lost her balance and fell forward hitting the left side of her head and chest wall c/o chest wall pain with pain on inspiration and neck pain.  No nausea/vomiting.  No other joint pain. Placed in c-collar by EMS.  PMD Dr. Bryan.

## 2018-03-18 ENCOUNTER — INPATIENT (INPATIENT)
Facility: HOSPITAL | Age: 83
LOS: 3 days | Discharge: NURSING, REHAB OR LONG-TERM CARE | DRG: 184 | End: 2018-03-22
Attending: SURGERY | Admitting: SURGERY
Payer: MEDICARE

## 2018-03-18 VITALS
OXYGEN SATURATION: 98 % | HEART RATE: 87 BPM | TEMPERATURE: 98 F | RESPIRATION RATE: 20 BRPM | SYSTOLIC BLOOD PRESSURE: 154 MMHG | DIASTOLIC BLOOD PRESSURE: 101 MMHG

## 2018-03-18 DIAGNOSIS — Z90.49 ACQUIRED ABSENCE OF OTHER SPECIFIED PARTS OF DIGESTIVE TRACT: Chronic | ICD-10-CM

## 2018-03-18 DIAGNOSIS — S22.20XA UNSPECIFIED FRACTURE OF STERNUM, INITIAL ENCOUNTER FOR CLOSED FRACTURE: ICD-10-CM

## 2018-03-18 LAB
APPEARANCE UR: CLEAR — SIGNIFICANT CHANGE UP
BACTERIA # UR AUTO: ABNORMAL /HPF
BILIRUB UR-MCNC: NEGATIVE — SIGNIFICANT CHANGE UP
COLOR SPEC: YELLOW — SIGNIFICANT CHANGE UP
COMMENT - URINE: SIGNIFICANT CHANGE UP
DIFF PNL FLD: NEGATIVE — SIGNIFICANT CHANGE UP
EPI CELLS # UR: SIGNIFICANT CHANGE UP /HPF
GLUCOSE UR QL: NEGATIVE — SIGNIFICANT CHANGE UP
KETONES UR-MCNC: ABNORMAL
LEUKOCYTE ESTERASE UR-ACNC: NEGATIVE — SIGNIFICANT CHANGE UP
NITRITE UR-MCNC: NEGATIVE — SIGNIFICANT CHANGE UP
PH UR: 6 — SIGNIFICANT CHANGE UP (ref 5–8)
PROT UR-MCNC: 30 MG/DL
RBC CASTS # UR COMP ASSIST: SIGNIFICANT CHANGE UP /HPF (ref 0–2)
SP GR SPEC: >1.03 — HIGH (ref 1.01–1.02)
UROBILINOGEN FLD QL: NEGATIVE — SIGNIFICANT CHANGE UP
WBC UR QL: SIGNIFICANT CHANGE UP /HPF (ref 0–5)

## 2018-03-18 PROCEDURE — 99222 1ST HOSP IP/OBS MODERATE 55: CPT

## 2018-03-18 PROCEDURE — 71045 X-RAY EXAM CHEST 1 VIEW: CPT | Mod: 26

## 2018-03-18 PROCEDURE — 99284 EMERGENCY DEPT VISIT MOD MDM: CPT | Mod: GC

## 2018-03-18 RX ORDER — MORPHINE SULFATE 50 MG/1
4 CAPSULE, EXTENDED RELEASE ORAL ONCE
Qty: 0 | Refills: 0 | Status: DISCONTINUED | OUTPATIENT
Start: 2018-03-18 | End: 2018-03-18

## 2018-03-18 RX ORDER — LEVOTHYROXINE SODIUM 125 MCG
12.5 TABLET ORAL DAILY
Qty: 0 | Refills: 0 | Status: DISCONTINUED | OUTPATIENT
Start: 2018-03-18 | End: 2018-03-22

## 2018-03-18 RX ORDER — ENOXAPARIN SODIUM 100 MG/ML
30 INJECTION SUBCUTANEOUS EVERY 24 HOURS
Qty: 0 | Refills: 0 | Status: DISCONTINUED | OUTPATIENT
Start: 2018-03-18 | End: 2018-03-22

## 2018-03-18 RX ORDER — TRAMADOL HYDROCHLORIDE 50 MG/1
25 TABLET ORAL EVERY 6 HOURS
Qty: 0 | Refills: 0 | Status: DISCONTINUED | OUTPATIENT
Start: 2018-03-18 | End: 2018-03-18

## 2018-03-18 RX ORDER — KETOROLAC TROMETHAMINE 30 MG/ML
15 SYRINGE (ML) INJECTION EVERY 6 HOURS
Qty: 0 | Refills: 0 | Status: DISCONTINUED | OUTPATIENT
Start: 2018-03-18 | End: 2018-03-21

## 2018-03-18 RX ORDER — CHOLECALCIFEROL (VITAMIN D3) 125 MCG
2000 CAPSULE ORAL
Qty: 0 | Refills: 0 | Status: DISCONTINUED | OUTPATIENT
Start: 2018-03-18 | End: 2018-03-22

## 2018-03-18 RX ORDER — FOLIC ACID 0.8 MG
0.4 TABLET ORAL DAILY
Qty: 0 | Refills: 0 | Status: DISCONTINUED | OUTPATIENT
Start: 2018-03-18 | End: 2018-03-22

## 2018-03-18 RX ORDER — ONDANSETRON 8 MG/1
4 TABLET, FILM COATED ORAL ONCE
Qty: 0 | Refills: 0 | Status: COMPLETED | OUTPATIENT
Start: 2018-03-18 | End: 2018-03-18

## 2018-03-18 RX ORDER — ACETAMINOPHEN 500 MG
650 TABLET ORAL EVERY 6 HOURS
Qty: 0 | Refills: 0 | Status: COMPLETED | OUTPATIENT
Start: 2018-03-18 | End: 2018-03-21

## 2018-03-18 RX ORDER — DOCUSATE SODIUM 100 MG
100 CAPSULE ORAL THREE TIMES A DAY
Qty: 0 | Refills: 0 | Status: DISCONTINUED | OUTPATIENT
Start: 2018-03-18 | End: 2018-03-22

## 2018-03-18 RX ORDER — FAMOTIDINE 10 MG/ML
20 INJECTION INTRAVENOUS DAILY
Qty: 0 | Refills: 0 | Status: DISCONTINUED | OUTPATIENT
Start: 2018-03-18 | End: 2018-03-22

## 2018-03-18 RX ORDER — PREGABALIN 225 MG/1
100 CAPSULE ORAL DAILY
Qty: 0 | Refills: 0 | Status: DISCONTINUED | OUTPATIENT
Start: 2018-03-18 | End: 2018-03-22

## 2018-03-18 RX ORDER — SODIUM CHLORIDE 9 MG/ML
1000 INJECTION INTRAMUSCULAR; INTRAVENOUS; SUBCUTANEOUS
Qty: 0 | Refills: 0 | Status: DISCONTINUED | OUTPATIENT
Start: 2018-03-18 | End: 2018-03-19

## 2018-03-18 RX ORDER — DIPHENHYDRAMINE HCL 50 MG
25 CAPSULE ORAL ONCE
Qty: 0 | Refills: 0 | Status: DISCONTINUED | OUTPATIENT
Start: 2018-03-18 | End: 2018-03-18

## 2018-03-18 RX ORDER — SENNA PLUS 8.6 MG/1
2 TABLET ORAL AT BEDTIME
Qty: 0 | Refills: 0 | Status: DISCONTINUED | OUTPATIENT
Start: 2018-03-18 | End: 2018-03-22

## 2018-03-18 RX ORDER — LIDOCAINE 4 G/100G
1 CREAM TOPICAL EVERY 24 HOURS
Qty: 0 | Refills: 0 | Status: DISCONTINUED | OUTPATIENT
Start: 2018-03-18 | End: 2018-03-22

## 2018-03-18 RX ORDER — ACETAMINOPHEN 500 MG
500 TABLET ORAL ONCE
Qty: 0 | Refills: 0 | Status: DISCONTINUED | OUTPATIENT
Start: 2018-03-18 | End: 2018-03-18

## 2018-03-18 RX ORDER — POLYETHYLENE GLYCOL 3350 17 G/17G
17 POWDER, FOR SOLUTION ORAL DAILY
Qty: 0 | Refills: 0 | Status: DISCONTINUED | OUTPATIENT
Start: 2018-03-18 | End: 2018-03-22

## 2018-03-18 RX ADMIN — POLYETHYLENE GLYCOL 3350 17 GRAM(S): 17 POWDER, FOR SOLUTION ORAL at 13:54

## 2018-03-18 RX ADMIN — Medication 650 MILLIGRAM(S): at 12:38

## 2018-03-18 RX ADMIN — Medication 0.4 MILLIGRAM(S): at 13:54

## 2018-03-18 RX ADMIN — Medication 15 MILLIGRAM(S): at 18:58

## 2018-03-18 RX ADMIN — ENOXAPARIN SODIUM 30 MILLIGRAM(S): 100 INJECTION SUBCUTANEOUS at 13:53

## 2018-03-18 RX ADMIN — LIDOCAINE 1 PATCH: 4 CREAM TOPICAL at 13:54

## 2018-03-18 RX ADMIN — Medication 2000 UNIT(S): at 17:56

## 2018-03-18 RX ADMIN — MORPHINE SULFATE 4 MILLIGRAM(S): 50 CAPSULE, EXTENDED RELEASE ORAL at 01:46

## 2018-03-18 RX ADMIN — SODIUM CHLORIDE 50 MILLILITER(S): 9 INJECTION INTRAMUSCULAR; INTRAVENOUS; SUBCUTANEOUS at 21:21

## 2018-03-18 RX ADMIN — Medication 650 MILLIGRAM(S): at 11:47

## 2018-03-18 RX ADMIN — PREGABALIN 100 MICROGRAM(S): 225 CAPSULE ORAL at 13:53

## 2018-03-18 RX ADMIN — Medication 100 MILLIGRAM(S): at 17:51

## 2018-03-18 RX ADMIN — FAMOTIDINE 20 MILLIGRAM(S): 10 INJECTION INTRAVENOUS at 13:52

## 2018-03-18 RX ADMIN — Medication 650 MILLIGRAM(S): at 17:56

## 2018-03-18 RX ADMIN — MORPHINE SULFATE 4 MILLIGRAM(S): 50 CAPSULE, EXTENDED RELEASE ORAL at 02:46

## 2018-03-18 RX ADMIN — Medication 15 MILLIGRAM(S): at 17:56

## 2018-03-18 RX ADMIN — MORPHINE SULFATE 4 MILLIGRAM(S): 50 CAPSULE, EXTENDED RELEASE ORAL at 06:18

## 2018-03-18 RX ADMIN — Medication 12.5 MICROGRAM(S): at 13:52

## 2018-03-18 RX ADMIN — Medication 650 MILLIGRAM(S): at 18:58

## 2018-03-18 RX ADMIN — ONDANSETRON 4 MILLIGRAM(S): 8 TABLET, FILM COATED ORAL at 06:18

## 2018-03-18 NOTE — ED PROVIDER NOTE - PHYSICAL EXAMINATION
Gen: Well appearing, NAD, GCS 15  Head: NCAT  Neck: in c-collar  HEENT: MMM, Normal conjunctiva  Lung: CTAB, no rales, rhonchi or wheezing. +sternal tenderness  CV: RRR, no murmurs, rubs or gallops  Abd: soft, NTND, no rebound or guarding  MSK: No CVA tenderness. No edema, no visible deformities  Neuro: No focal neurologic deficits.   Skin: Warm and dry, no evidence of rash  Psych: normal mood and affect, A&Ox3

## 2018-03-18 NOTE — ED PROVIDER NOTE - ATTENDING CONTRIBUTION TO CARE
Elderly woman s/p mechanical fall, seen at [facility] and found to have type II dens fracture, T4 fracture and sternal fracture, transferred to [facility] for trauma and spinal surgery consultations.  Still reporting sternal pain.    On exam patient uncomfortable, hypoxic on room air, otherwise vital signs within normal limits, RRR S1/S2, lungs clear, abdomen soft, non tender, non distended, L ankle tenderness to palpation (family and patient reporting this is chronic) otherwise no other extremity tenderness, no noted deformity.  Patient in cervical collar.    Patient already with multiple CT images with injuries noted above, hypoxia likely secondary to shallow breathing due to pain from sternal fracture, will continue pain control in ED, consult neurosurgery and trauma surgery for evaluation in ED, admit for further care.

## 2018-03-18 NOTE — CONSULT NOTE ADULT - ASSESSMENT
89 s/p fall with type 2 displaced dens fracture  Given patient age, would not consider surgical intervention for this unstable fracture.  Maintain patient in [place] J collar (indefinitely). MRI cspine can be considered, but will not change operative management.   Q4 neurochecks  Trauma evaluation appreciated  D/w neurosurgery attending

## 2018-03-18 NOTE — CONSULT NOTE ADULT - SUBJECTIVE AND OBJECTIVE BOX
HISTORY OF PRESENT ILLNESS:  LYNN ANDRADE is a 89y Female with PMH diverticulitis s/p SBR (2014), s/p lap sandoval (2017), osteoporosis, GERD presents as a transfer from an outside hospital following fall. She was sitting in her chair attempting to reach her shoes and fell forward. She says she hit her head but is not quite sure if she lost consciousness. She and her daughter (at bedside) describe that she then called her daughter, who called the neighbor to assist her. She was taken to the OSH, noted to have a displaced sternal fracture, a  Type 2 dens fracture, T4 vertebral body fracture and transferred to [facility] for further Trauma care.     PAST MEDICAL HISTORY: Diverticulitis  Pelvic fracture  Fall  Anemia  Constipation  Osteoporosis  Diverticulitis  GERD (gastroesophageal reflux disease)  Hypothyroid  Reflux  Bronchitis      PAST SURGICAL HISTORY: S/P laparoscopic cholecystectomy  S/P small bowel resection  Ovarian cyst  S/P appendectomy  Hip fracture requiring operative repair      FAMILY HISTORY: No pertinent family history in first degree relatives  No significant family history      SOCIAL HISTORY: Non-smoker.   	Daughter lives in her mother's home.  .   Son is an anesthesiologist at [employer], daughter-in-law is a nurse.    CODE STATUS: Full code    HOME MEDICATIONS:  Home Medications:  Aspirin Enteric Coated 81 mg oral delayed release tablet: 1 tab(s) orally once a day (17 Mar 2018 20:33)  Calcium 600+D: 1 tab(s) orally 2 times a day (17 Mar 2018 20:33)  Centrum oral tablet: 1 tab(s) orally once a day (17 Mar 2018 20:33)  Colace 100 mg oral capsule: 1 cap(s) orally once a day (17 Mar 2018 20:33)  folic acid 0.4 mg oral tablet: 1 tab(s) orally once a day (17 Mar 2018 20:33)  levothyroxine: 12.5 microgram(s) orally once a day (17 Mar 2018 20:33)  MiraLax oral powder for reconstitution: 1 cap(s) orally every other day (17 Mar 2018 20:33)  PreserVision AREDS 2 oral capsule: 1 cap(s) orally 2 times a day (17 Mar 2018 20:33)  Probiotic: 1 tab(s) orally once a day (17 Mar 2018 20:33)  Slow Fe (as elemental iron) 45 mg oral tablet, extended release: 1 tab(s) orally once a day (17 Mar 2018 20:33)  Systane Balance ophthalmic solution: 2 drop(s) to each affected eye 2 times a day (17 Mar 2018 20:33)  tramadol-acetaminophen 37.5 mg-325 mg oral tablet: 1 tab(s) orally 2 times a day (17 Mar 2018 20:33)  Vitamin B Complex 100: 1 tab(s) orally once a day (17 Mar 2018 20:33)  Vitamin B12: 1 tab(s) orally once a day (17 Mar 2018 20:33)  Vitamin D3 2000 intl units oral tablet: 1 tab(s) orally 2 times a day (17 Mar 2018 20:33)  Zantac 300 oral tablet: 1 tab(s) orally once a day (at bedtime) (17 Mar 2018 20:33)      ALLERGIES: No Known Allergies      VITAL SIGNS:  ICU Vital Signs Last 24 Hrs  T(C): 36.7 (18 Mar 2018 04:27), Max: 36.8 (18 Mar 2018 00:30)  T(F): 98.1 (18 Mar 2018 04:27), Max: 98.3 (18 Mar 2018 00:30)  HR: 91 (18 Mar 2018 05:07) (84 - 95)  BP: 127/84 (18 Mar 2018 05:07) (119/79 - 208/113)  BP(mean): --  ABP: --  ABP(mean): --  RR: 19 (18 Mar 2018 05:07) (15 - 20)  SpO2: 98% (18 Mar 2018 05:07) (87% - 99%)      NEURO  Exam: alert and in moderate distress due to pain  Meds:    RESPIRATORY  Mechanical Ventilation:     Exam:  Meds:    CARDIOVASCULAR    Exam:  Cardiac Rhythm:  Meds:    GI/NUTRITION  Exam:  Diet:  Meds:    GENITOURINARY/RENAL  Meds:    Weight (kg): 48.1 ( @ 19:50)      139  |  104  |  20  ----------------------------<  141<H>  3.6   |  23  |  0.69    Ca    9.5      17 Mar 2018 20:13    TPro  7.6  /  Alb  3.9  /  TBili  0.5  /  DBili  x   /  AST  28  /  ALT  23  /  AlkPhos  75      [ ] Em catheter, indication: urine output monitoring in critically ill patient    HEMATOLOGIC  [ ] VTE Prophylaxis:                          12.6   11.2  )-----------( 216      ( 17 Mar 2018 20:13 )             37.9     PT/INR - ( 17 Mar 2018 20:13 )   PT: 10.8 sec;   INR: 0.99 ratio         PTT - ( 17 Mar 2018 20:13 )  PTT:27.8 sec  Transfusion: [ ] PRBC	[ ] Platelets	[ ] FFP	[ ] Cryoprecipitate      INFECTIOUS DISEASES  Meds:  RECENT CULTURES:      ENDOCRINE  Meds:  CAPILLARY BLOOD GLUCOSE          PATIENT CARE ACCESS DEVICES:  [ ] Peripheral IV  [ ] Central Venous Line	[ ] R	[ ] L	[ ] IJ	[ ] Fem	[ ] SC	Placed:   [ ] Arterial Line		[ ] R	[ ] L	[ ] Fem	[ ] Rad	[ ] Ax	Placed:   [ ] PICC:					[ ] Mediport  [ ] Urinary Catheter, Date Placed:   [x] Necessity of urinary, arterial, and venous catheters discussed    OTHER MEDICATIONS:     IMAGING STUDIES: HISTORY OF PRESENT ILLNESS:  LYNN ANDRADE is a 89y Female with PMH diverticulitis s/p SBR (2014), s/p lap sandoval (2017), osteoporosis, GERD presents as a transfer from an outside hospital following fall. She was sitting in her chair attempting to reach her shoes and fell forward. She says she hit her head but is not quite sure if she lost consciousness. She and her daughter (at bedside) describe that she then called her daughter, who called the neighbor to assist her. She was taken to the OSH, noted to have a displaced sternal fracture, a  Type 2 dens fracture, T4 vertebral body fracture and transferred to [facility] for further Trauma care.     PAST MEDICAL HISTORY: Diverticulitis  Pelvic fracture  Fall  Anemia  Constipation  Osteoporosis  Diverticulitis  GERD (gastroesophageal reflux disease)  Hypothyroid  Reflux  Bronchitis      PAST SURGICAL HISTORY: S/P laparoscopic cholecystectomy  S/P small bowel resection  Ovarian cyst  S/P appendectomy  Hip fracture requiring operative repair      FAMILY HISTORY: No pertinent family history in first degree relatives  No significant family history      SOCIAL HISTORY: Non-smoker.   	Daughter lives in her mother's home.  .   Son is an anesthesiologist at [employer], daughter-in-law is a nurse.    CODE STATUS: Full code    HOME MEDICATIONS:  Home Medications:  Aspirin Enteric Coated 81 mg oral delayed release tablet: 1 tab(s) orally once a day (17 Mar 2018 20:33)  Calcium 600+D: 1 tab(s) orally 2 times a day (17 Mar 2018 20:33)  Centrum oral tablet: 1 tab(s) orally once a day (17 Mar 2018 20:33)  Colace 100 mg oral capsule: 1 cap(s) orally once a day (17 Mar 2018 20:33)  folic acid 0.4 mg oral tablet: 1 tab(s) orally once a day (17 Mar 2018 20:33)  levothyroxine: 12.5 microgram(s) orally once a day (17 Mar 2018 20:33)  MiraLax oral powder for reconstitution: 1 cap(s) orally every other day (17 Mar 2018 20:33)  PreserVision AREDS 2 oral capsule: 1 cap(s) orally 2 times a day (17 Mar 2018 20:33)  Probiotic: 1 tab(s) orally once a day (17 Mar 2018 20:33)  Slow Fe (as elemental iron) 45 mg oral tablet, extended release: 1 tab(s) orally once a day (17 Mar 2018 20:33)  Systane Balance ophthalmic solution: 2 drop(s) to each affected eye 2 times a day (17 Mar 2018 20:33)  tramadol-acetaminophen 37.5 mg-325 mg oral tablet: 1 tab(s) orally 2 times a day (17 Mar 2018 20:33)  Vitamin B Complex 100: 1 tab(s) orally once a day (17 Mar 2018 20:33)  Vitamin B12: 1 tab(s) orally once a day (17 Mar 2018 20:33)  Vitamin D3 2000 intl units oral tablet: 1 tab(s) orally 2 times a day (17 Mar 2018 20:33)  Zantac 300 oral tablet: 1 tab(s) orally once a day (at bedtime) (17 Mar 2018 20:33)      ALLERGIES: No Known Allergies      VITAL SIGNS:  ICU Vital Signs Last 24 Hrs  T(C): 36.7 (18 Mar 2018 04:27), Max: 36.8 (18 Mar 2018 00:30)  T(F): 98.1 (18 Mar 2018 04:27), Max: 98.3 (18 Mar 2018 00:30)  HR: 91 (18 Mar 2018 05:07) (84 - 95)  BP: 127/84 (18 Mar 2018 05:07) (119/79 - 208/113)  BP(mean): --  ABP: --  ABP(mean): --  RR: 19 (18 Mar 2018 05:07) (15 - 20)  SpO2: 98% (18 Mar 2018 05:07) (87% - 99%)      NEURO  Exam: alert and in moderate distress due to pain  Meds:    RESPIRATORY  Mechanical Ventilation:     Exam: clear to auscultation bilaterally, deep breaths limited due to pain  Meds: none    CARDIOVASCULAR    Exam: regular rate and rhythm  Cardiac Rhythm: sinus  Meds: none    GI/NUTRITION  Exam: soft, large, easily reducible ventral hernia  Diet: NPO  Meds: none    GENITOURINARY/RENAL  Meds: none    Weight (kg): 48.1 ( @ 19:50)      139  |  104  |  20  ----------------------------<  141<H>  3.6   |  23  |  0.69    Ca    9.5      17 Mar 2018 20:13    TPro  7.6  /  Alb  3.9  /  TBili  0.5  /  DBili  x   /  AST  28  /  ALT  23  /  AlkPhos  75  03-17    [ ] Em catheter, indication: urine output monitoring in critically ill patient- N/A    HEMATOLOGIC  [ x] VTE Prophylaxis: SQH                          12.6   11.2  )-----------( 216      ( 17 Mar 2018 20:13 )             37.9     PT/INR - ( 17 Mar 2018 20:13 )   PT: 10.8 sec;   INR: 0.99 ratio         PTT - ( 17 Mar 2018 20:13 )  PTT:27.8 sec  Transfusion: [ ] PRBC	[ ] Platelets	[ ] FFP	[ ] Cryoprecipitate- N/A      INFECTIOUS DISEASES  Meds: none  RECENT CULTURES:      ENDOCRINE  Meds: synthroid  CAPILLARY BLOOD GLUCOSE          PATIENT CARE ACCESS DEVICES:  [ x] Peripheral IV  [ ] Central Venous Line	[ ] R	[ ] L	[ ] IJ	[ ] Fem	[ ] SC	Placed:   [ ] Arterial Line		[ ] R	[ ] L	[ ] Fem	[ ] Rad	[ ] Ax	Placed:   [ ] PICC:					[ ] Mediport  [ ] Urinary Catheter, Date Placed:   [x] Necessity of urinary, arterial, and venous catheters discussed    OTHER MEDICATIONS:     IMAGING STUDIES:    EXAM:  CT ABDOMEN AND PELVIS IC                          EXAM:  CT CHEST IC                            PROCEDURE DATE:  2018          INTERPRETATION:  CLINICAL INFORMATION: Status post fall.    COMPARISON: CT chest of 5/15/2016 and CT abdomenof 2017.   Correlation with MR lumbar spine of 2017.    PROCEDURE:   CT of the Chest, Abdomen and Pelvis was performed with intravenous   contrast.   Imaging was performed through the chest in the arterial phase followed by   imaging of theabdomen and pelvis in the portal venous phase.  Intravenous contrast: 90 ml Omnipaque 350. 10 ml discarded.  Oral contrast:None.  Sagittal and coronal reformats were performed. MIP reformats were   obtained.    FINDINGS:    CHEST:     LUNGS AND LARGEAIRWAYS: Patent central airways. Scattered foci of linear   subsegmental atelectasis.  PLEURA: No pleural effusion or pneumothorax.  VESSELS: Ascending thoracic aorta measures up to 4.1 cm at the level of   the main pulmonary artery and 3.5 cm at thelevel of the distal arch.  HEART: Heart size is enlarged. No pericardial effusion. Coronary artery   calcification.  MEDIASTINUM AND ANAIS: No lymphadenopathy. No evidence of mediastinal   hematoma.  CHEST WALL AND LOWER NECK: Acute displaced proximalto mid sternal   fracture with overriding major fracture fragments. The superior major   fracture fragment is posteriorly displaced in relation to the inferior   fragment. There is surrounding soft tissue infiltration, likely   representing hematoma.Acute fracture involving the T4 vertebral body   with associated mild to moderate vertebral body height loss of   approximately 50%. Fracture lucency extends posteriorly within the   bilateral posterior elements and spinous process. Nonspecific rounded   sclerosis within sinus process of the T7 vertebral body. Chronic   appearing severe compression deformity of the L1 vertebral body with   associated bony retropulsion of approximately 8 to 9 mm resulting in   spinal canal stenosis.    ABDOMEN ANDPELVIS:    LIVER: Within normal limits.  BILE DUCTS: Normal caliber.  GALLBLADDER: Status post cholecystectomy.  SPLEEN: Within normal limits.  PANCREAS: Within normal limits.  ADRENALS: Within normal limits.  KIDNEYS/URETERS: Within normal limits.    BLADDER: Within normal limits.  REPRODUCTIVE ORGANS: The uterus and adnexa are within normal limits.    BOWEL: No bowel obstruction. Large amount of stool within the rectum. No   discrete rectal wall thickening or surrounding inflammatory change.   Sigmoid and scattered colonic diverticulosis without diverticulitis.   Small bowel anastomosis within the left lower quadrant.  PERITONEUM: No ascites.  VESSELS:  Tortuous abdominal aorta demonstrating atherosclerosis.  RETROPERITONEUM: No lymphadenopathy.    ABDOMINAL WALL: Ventricles abdominal wall hernia containing a   nonobstructed loop of small bowel.  BONES: Status post open reduction internal fixation of the right hip.   Chronic right superior and inferior pubic rami fracture deformities.    IMPRESSION:    Acute displaced and overriding sternal fracture. No mediastinal hematoma.    Ascending thoracic aorta measures up to 4.1 cm at the level of the main   pulmonary artery. No evidence of acute traumatic injury to the   intrathoracic vasculature.    Acute fracture involving the T4 vertebral body, resulting in mild to   moderate vertebral body height loss. Fracture extends into the posterior   elements. Further evaluation with MRI of the thoracic spine may be   obtained as clinically warranted, if no contraindications exist.    Severe compression deformity of the L1 vertebral body, which appears   chronic in nature, however is new/progressed since MRI lumbar spine of   2017, now resulting in bony retropulsion into the spinal canal with   associated spinal canal stenosis.    Dr. Linn discussed the above findings with Dr. Valenzuela at 10:15 PM on   3/17/2018 with read back.      PAOLO LINN M.D., ATTENDING RADIOLOGIST  This document has been electronically signed. Mar 17 2018 10:49PM

## 2018-03-18 NOTE — CONSULT NOTE ADULT - ASSESSMENT
89 yo woman s/p fall from standing, now with Type 2 dens fracture, T4 transverse body fracture, displaced sternal fracture with inspiration limited by pain. Cardiac enzymes not suggestive of myocardial injury. 89 yo woman s/p fall from standing, now with Type 2 dens fracture, T4 transverse body fracture, displaced sternal fracture with inspiration limited by pain.     Neuro:  - monitor mental status  - pain control w/ tylenol, lidoderm patch, NSAIDs    Resp:  - monitor resp status closely in the setting of pain related to sternal fracture  - nasal cannula to keep SpO2 >92%    CV:  - hemodynamically stable, not requiring pressor support      GI:  - regular diet, bowel regimen    :  - monitor UOP  - replete lytes PRN    ID:  - no active infectious process at this time  - monitor WBC and temp    Heme:  - VTE ppx w/ Lovenox  - monitor CBC    Endo:  - c/w Synthroid  - monitor BG on BMP    Dispo:  - SICU  - Full code    - PAMELA Cunningham, PGY2  x [phone]

## 2018-03-18 NOTE — ED PROVIDER NOTE - OBJECTIVE STATEMENT
89 y.o. female pw mechanical fall transfer from [facility] for Sternal fx, type 2 dens fracture and T4 vertebral body fracture. Pt hypoxic there to 80%, improved with NC. Pt complaining of chest pain and neck pain. Given mophine prior to transport. In C-collar. On Aspirin but no other blood thinners. 89 y.o. female pw mechanical fall transfer from [facility] for Sternal fx, type 2 dens fracture and T4 vertebral body fracture. Pt hypoxic there to 80%, improved with NC. Pt complaining of chest pain and neck pain. Given morphine prior to transport. In C-collar. On Aspirin but no other blood thinners.

## 2018-03-18 NOTE — H&P ADULT - HISTORY OF PRESENT ILLNESS
89 yo woman with PMH diverticulitis s/p SBR (2014), s/p lap sandoval (2017), osteoporosis, GERD who presented as a transfer from an OSH for a fall at home. Patient was attempted to get her shoes on when she slipped. CT indicated Type 2 dens fracture, T4 transverse body fracture and a displaced sternal fracture.     At baseline, patient performs most of her ADL; she is able to use the bathroom and cook independently. Patient evaluated with her daughter at bedside who provided collateral information.

## 2018-03-18 NOTE — H&P ADULT - NSHPLABSRESULTS_GEN_ALL_CORE
< end of copied text >    < from: CT Chest w/ IV Cont (03.17.18 @ 21:57) >    IMPRESSION:    Acute displaced and overriding sternal fracture. No mediastinal hematoma.    Ascending thoracic aorta measures up to 4.1 cm at the level of the main   pulmonary artery. No evidence of acute traumatic injury to the   intrathoracic vasculature.    Acute fracture involving the T4 vertebral body, resulting in mild to   moderate vertebral body height loss. Fracture extends into the posterior   elements. Further evaluation with MRI of the thoracic spine may be   obtained as clinically warranted, if no contraindications exist.    Severe compression deformity of the L1 vertebral body, which appears   chronic in nature, however is new/progressed since MRI lumbar spine of   9/20/2017, now resulting in bony retropulsion into the spinal canal with   associated spinal canal stenosis.    Dr. Dawn discussed the above findings with Dr. Valenzuela at 10:15 PM on   3/17/2018 with read back.    PAOLO DAWN M.D., ATTENDING RADIOLOGIST  This document has been electronically signed. Mar 17 2018 10:49PM        < end of copied text >

## 2018-03-18 NOTE — H&P ADULT - PSH
Hip fracture requiring operative repair  right  Ovarian cyst  right  S/P appendectomy    S/P laparoscopic cholecystectomy    S/P small bowel resection

## 2018-03-18 NOTE — ED ADULT NURSE REASSESSMENT NOTE - NS ED NURSE REASSESS COMMENT FT1
Patient states she still has sternum pain 8/10 but states "I can tolerate the pain". Patient does not want additional pain medications at this time. Will continue to monitor and patient safety maintained.

## 2018-03-18 NOTE — CONSULT NOTE ADULT - SUBJECTIVE AND OBJECTIVE BOX
[facility] 89 [facility] s/p mechanical fall. CT shows evidence of type 2 displaced dens fracture, T4 vertebral body fracture. Patient was hypoxic at OSH improved with nasal cannula. Complaining of cervical and chest pain. Patient is in c-collar. Is on ASA.    Exam: AAOX3, FC, moving all extremities.    CT head: negative for hemorrhage  CT Cspine: Type 2 displaced dens fracture  CT T/L spine: T4 and L1 compression fracture.

## 2018-03-18 NOTE — CONSULT NOTE ADULT - NSHPATTENDINGPLANDISCUSS_GEN_ALL_CORE
[facility] system checked--last filled 10/12
From: Joel Levy  To: Cherie Chen MD  Sent: 11/9/2017 9:04 AM EST  Subject: Medication Renewal Request    Original authorizing provider: MD Joel Dallas would like a refill of the following medications:  fentaNYL (DURAGESIC) 25 mcg/hr PATCH Cherie Chen MD]    Preferred pharmacy: [id number], [facility]    Comment:
patient and patient's family

## 2018-03-18 NOTE — ED ADULT NURSE NOTE - OBJECTIVE STATEMENT
89 y.o F presents to the ED from [facility] s/p fall. As per EMS "the patient is being transferred for a T4 fracture and a sternal fracture." As per EMS, the patient received a total of 10 mg of morphine at [place]- the last dose being 2 mg @ 23:30. As per patient, she was sitting on a chair, trying to put on her shoes when she fell onto her chest earlier this evening. Patient presents a&Ox3 and afebrile; c-collar remains in place; patient endorses sternal pain- RR 20 and SPO2 98 on 4L NC, lungs clear. Abdomen soft, nontender and nondistended- visible hernia, denies nausea/vomiting.

## 2018-03-18 NOTE — ED PROVIDER NOTE - MEDICAL DECISION MAKING DETAILS
89 y.o. female pw multiple injuries sp mechanical fall. Will consult spine, trauma, analgesia, admit.

## 2018-03-18 NOTE — H&P ADULT - ASSESSMENT
89 yo woman s/p fall from standing, now with Type 2 dens fracture, T4 transverse body fracture, displaced sternal fracture with inspiration limited by pain. Cardiac enzymes not suggestive of myocardial injury.     - SICU consultation for respiratory status monitoring   - Neurosurgical consultation appreciated; indefinite [place] J cervical collar  - q4 neurochecks   - EKG   - Pain control with RTC IV Tylenol, lidocaine patch, IV breakthrough dilaudid  - Resume home medications (synthroid, ASA, bowel regimen)  - Elevation of b/l LE for chronic mild edema while in bed  - Full code as per advanced directive documentation from 9/2017    Patient seen and discussed with senior resident, Dr. Ruben Avila PGY4  Discussed with Surgical Attending, Dr. Lesley Flores MD PGY2  ATP: [id number]

## 2018-03-18 NOTE — H&P ADULT - NSHPSOCIALHISTORY_GEN_ALL_CORE
Non-smoker.   Daughter lives in her mother's home.  .   Son is an anesthesiologist at [employer], daughter-in-law is a nurse.

## 2018-03-18 NOTE — ED ADULT NURSE REASSESSMENT NOTE - NS ED NURSE REASSESS COMMENT FT1
[place] collar placed on patient with c-spine maintained with 3 RN's at the bedside. Patient admitted and waiting for sicu bed. Daughter at the bedside. Will continue to monitor and patient safety maintained.

## 2018-03-18 NOTE — ED PROVIDER NOTE - NS ED ROS FT
ROS: denies HA, weakness, dizziness, fevers/chills, nausea/vomiting, SOB, diaphoresis, abdominal pain,  dysuria/hematuria, or rash  +neck pain, back pain, chest pain

## 2018-03-18 NOTE — H&P ADULT - NSHPPHYSICALEXAM_GEN_ALL_CORE
Gen: Pleasant woman AOx3  Neuro: PAULINO  HEENT: Hard cervical collar in place  Pulm: Limited chest expansion. 94% on 5L NC. Less than 500 cc on maximal inspiration with IS.   Chest: Sternal tenderness.   Abd: Softly distended abdomen, nontender.   Ext: Mildly swollen bilateral LE

## 2018-03-19 LAB
ANION GAP SERPL CALC-SCNC: 11 MMOL/L — SIGNIFICANT CHANGE UP (ref 5–17)
BUN SERPL-MCNC: 23 MG/DL — SIGNIFICANT CHANGE UP (ref 7–23)
CA-I BLD-SCNC: 1.25 MMOL/L — SIGNIFICANT CHANGE UP (ref 1.12–1.3)
CALCIUM SERPL-MCNC: 9.5 MG/DL — SIGNIFICANT CHANGE UP (ref 8.4–10.5)
CHLORIDE SERPL-SCNC: 100 MMOL/L — SIGNIFICANT CHANGE UP (ref 96–108)
CO2 SERPL-SCNC: 26 MMOL/L — SIGNIFICANT CHANGE UP (ref 22–31)
CREAT SERPL-MCNC: 0.78 MG/DL — SIGNIFICANT CHANGE UP (ref 0.5–1.3)
GLUCOSE SERPL-MCNC: 105 MG/DL — HIGH (ref 70–99)
HCT VFR BLD CALC: 30.2 % — LOW (ref 34.5–45)
HGB BLD-MCNC: 10.3 G/DL — LOW (ref 11.5–15.5)
MAGNESIUM SERPL-MCNC: 1.8 MG/DL — SIGNIFICANT CHANGE UP (ref 1.6–2.6)
MCHC RBC-ENTMCNC: 33.4 PG — SIGNIFICANT CHANGE UP (ref 27–34)
MCHC RBC-ENTMCNC: 34.1 GM/DL — SIGNIFICANT CHANGE UP (ref 32–36)
MCV RBC AUTO: 97.9 FL — SIGNIFICANT CHANGE UP (ref 80–100)
PHOSPHATE SERPL-MCNC: 2.7 MG/DL — SIGNIFICANT CHANGE UP (ref 2.5–4.5)
PLATELET # BLD AUTO: 168 K/UL — SIGNIFICANT CHANGE UP (ref 150–400)
POTASSIUM SERPL-MCNC: 4.1 MMOL/L — SIGNIFICANT CHANGE UP (ref 3.5–5.3)
POTASSIUM SERPL-SCNC: 4.1 MMOL/L — SIGNIFICANT CHANGE UP (ref 3.5–5.3)
RBC # BLD: 3.09 M/UL — LOW (ref 3.8–5.2)
RBC # FLD: 12.5 % — SIGNIFICANT CHANGE UP (ref 10.3–14.5)
SODIUM SERPL-SCNC: 137 MMOL/L — SIGNIFICANT CHANGE UP (ref 135–145)
WBC # BLD: 6.5 K/UL — SIGNIFICANT CHANGE UP (ref 3.8–10.5)
WBC # FLD AUTO: 6.5 K/UL — SIGNIFICANT CHANGE UP (ref 3.8–10.5)

## 2018-03-19 PROCEDURE — 99232 SBSQ HOSP IP/OBS MODERATE 35: CPT | Mod: 25

## 2018-03-19 PROCEDURE — 99233 SBSQ HOSP IP/OBS HIGH 50: CPT | Mod: GC

## 2018-03-19 PROCEDURE — 71045 X-RAY EXAM CHEST 1 VIEW: CPT | Mod: 26

## 2018-03-19 RX ORDER — TRAMADOL HYDROCHLORIDE 50 MG/1
25 TABLET ORAL EVERY 6 HOURS
Qty: 0 | Refills: 0 | Status: DISCONTINUED | OUTPATIENT
Start: 2018-03-19 | End: 2018-03-21

## 2018-03-19 RX ORDER — MAGNESIUM SULFATE 500 MG/ML
2 VIAL (ML) INJECTION ONCE
Qty: 0 | Refills: 0 | Status: COMPLETED | OUTPATIENT
Start: 2018-03-19 | End: 2018-03-19

## 2018-03-19 RX ORDER — ACETAMINOPHEN 500 MG
650 TABLET ORAL ONCE
Qty: 0 | Refills: 0 | Status: COMPLETED | OUTPATIENT
Start: 2018-03-19 | End: 2018-03-19

## 2018-03-19 RX ADMIN — Medication 650 MILLIGRAM(S): at 07:00

## 2018-03-19 RX ADMIN — TRAMADOL HYDROCHLORIDE 25 MILLIGRAM(S): 50 TABLET ORAL at 09:14

## 2018-03-19 RX ADMIN — Medication 15 MILLIGRAM(S): at 12:17

## 2018-03-19 RX ADMIN — Medication 15 MILLIGRAM(S): at 06:45

## 2018-03-19 RX ADMIN — Medication 15 MILLIGRAM(S): at 18:42

## 2018-03-19 RX ADMIN — Medication 100 MILLIGRAM(S): at 06:30

## 2018-03-19 RX ADMIN — TRAMADOL HYDROCHLORIDE 25 MILLIGRAM(S): 50 TABLET ORAL at 08:44

## 2018-03-19 RX ADMIN — Medication 250 MILLIMOLE(S): at 03:42

## 2018-03-19 RX ADMIN — LIDOCAINE 1 PATCH: 4 CREAM TOPICAL at 00:57

## 2018-03-19 RX ADMIN — FAMOTIDINE 20 MILLIGRAM(S): 10 INJECTION INTRAVENOUS at 12:16

## 2018-03-19 RX ADMIN — Medication 15 MILLIGRAM(S): at 00:00

## 2018-03-19 RX ADMIN — Medication 0.4 MILLIGRAM(S): at 13:01

## 2018-03-19 RX ADMIN — SENNA PLUS 2 TABLET(S): 8.6 TABLET ORAL at 00:10

## 2018-03-19 RX ADMIN — Medication 650 MILLIGRAM(S): at 12:47

## 2018-03-19 RX ADMIN — POLYETHYLENE GLYCOL 3350 17 GRAM(S): 17 POWDER, FOR SOLUTION ORAL at 12:16

## 2018-03-19 RX ADMIN — Medication 2000 UNIT(S): at 06:30

## 2018-03-19 RX ADMIN — PREGABALIN 100 MICROGRAM(S): 225 CAPSULE ORAL at 12:16

## 2018-03-19 RX ADMIN — Medication 650 MILLIGRAM(S): at 00:00

## 2018-03-19 RX ADMIN — Medication 15 MILLIGRAM(S): at 06:30

## 2018-03-19 RX ADMIN — ENOXAPARIN SODIUM 30 MILLIGRAM(S): 100 INJECTION SUBCUTANEOUS at 13:44

## 2018-03-19 RX ADMIN — Medication 100 MILLIGRAM(S): at 13:45

## 2018-03-19 RX ADMIN — Medication 650 MILLIGRAM(S): at 00:30

## 2018-03-19 RX ADMIN — Medication 100 MILLIGRAM(S): at 00:10

## 2018-03-19 RX ADMIN — Medication 650 MILLIGRAM(S): at 06:30

## 2018-03-19 RX ADMIN — Medication 12.5 MICROGRAM(S): at 06:30

## 2018-03-19 RX ADMIN — LIDOCAINE 1 PATCH: 4 CREAM TOPICAL at 13:44

## 2018-03-19 RX ADMIN — Medication 50 GRAM(S): at 03:42

## 2018-03-19 RX ADMIN — Medication 650 MILLIGRAM(S): at 12:16

## 2018-03-19 RX ADMIN — Medication 650 MILLIGRAM(S): at 18:42

## 2018-03-19 RX ADMIN — Medication 15 MILLIGRAM(S): at 00:15

## 2018-03-19 RX ADMIN — Medication 15 MILLIGRAM(S): at 12:32

## 2018-03-19 RX ADMIN — Medication 2000 UNIT(S): at 18:42

## 2018-03-19 NOTE — PHYSICAL THERAPY INITIAL EVALUATION ADULT - BALANCE TRAINING, PT EVAL
GOAL: Pt would improve sitting/standing static/dynamic balance to good in 4 wks, in order to complete all functional activities safely

## 2018-03-19 NOTE — PROGRESS NOTE ADULT - SUBJECTIVE AND OBJECTIVE BOX
Trauma Surgery Progress Note     Subjective/24hour Events: Pt admitted to SICU after a fall.  She tolerated a regular diet and was IV locked overnight. Bill Beaver was consulted for a fitted C-collar/TLSO brace.    Vital Signs:  Vital Signs Last 24 Hrs  T(C): 36.8 (18 Mar 2018 23:00), Max: 37 (18 Mar 2018 11:00)  T(F): 98.2 (18 Mar 2018 23:00), Max: 98.6 (18 Mar 2018 11:00)  HR: 75 (19 Mar 2018 04:00) (69 - 91)  BP: 172/76 (19 Mar 2018 04:00) (124/71 - 195/90)  BP(mean): 109 (19 Mar 2018 04:00) (88 - 129)  RR: 18 (19 Mar 2018 04:00) (12 - 32)  SpO2: 97% (19 Mar 2018 04:00) (95% - 99%)    CAPILLARY BLOOD GLUCOSE          I&O's Detail    18 Mar 2018 07:01  -  19 Mar 2018 05:31  --------------------------------------------------------  IN:    IV PiggyBack: 100 mL    Oral Fluid: 800 mL    sodium chloride 0.9%: 450 mL    Solution: 250 mL  Total IN: 1600 mL    OUT:    Voided: 600 mL  Total OUT: 600 mL    Total NET: 1000 mL            MEDICATIONS  (STANDING):  acetaminophen   Tablet. 650 milliGRAM(s) Oral every 6 hours  cholecalciferol 2000 Unit(s) Oral two times a day  cyanocobalamin 100 MICROGram(s) Oral daily  docusate sodium 100 milliGRAM(s) Oral three times a day  enoxaparin Injectable 30 milliGRAM(s) SubCutaneous every 24 hours  famotidine    Tablet 20 milliGRAM(s) Oral daily  folic acid 0.4 milliGRAM(s) Oral daily  ketorolac   Injectable 15 milliGRAM(s) IV Push every 6 hours  levothyroxine 12.5 MICROGram(s) Oral daily  lidocaine   Patch 1 Patch Transdermal every 24 hours  polyethylene glycol 3350 17 Gram(s) Oral daily  senna 2 Tablet(s) Oral at bedtime    MEDICATIONS  (PRN):        Physical Exam:  Gen: NAD  LS:  Card:  GI:  Ext:       Labs:    03-19    137  |  100  |  23  ----------------------------<  105<H>  4.1   |  26  |  0.78    Ca    9.5      19 Mar 2018 02:53  Phos  2.7     03-19  Mg     1.8     03-19    TPro  7.6  /  Alb  3.9  /  TBili  0.5  /  DBili  x   /  AST  28  /  ALT  23  /  AlkPhos  75  03-17    LIVER FUNCTIONS - ( 17 Mar 2018 20:13 )  Alb: 3.9 g/dL / Pro: 7.6 g/dL / ALK PHOS: 75 U/L / ALT: 23 U/L / AST: 28 U/L / GGT: x                                 10.3   6.5   )-----------( 168      ( 19 Mar 2018 02:53 )             30.2     PT/INR - ( 17 Mar 2018 20:13 )   PT: 10.8 sec;   INR: 0.99 ratio         PTT - ( 17 Mar 2018 20:13 )  PTT:27.8 sec    PE:  HEENT: Hard cervical collar in place  Chest: Sternal tenderness.   Abd: Softly distended abdomen, nontender.   Ext: Mildly swollen bilateral LE    ASSESSMENT: 89 yo woman s/p fall from standing, now with Type 2 dens fracture, T4 transverse body fracture, displaced sternal fracture with inspiration limited by pain, and L1 compression fracture.    - C- collar. Non-operative intervention for Type 2 dens fracture  - TLSO Brace for thoracic and lumbar spinal fractures  - Regular diet, bowel regimen  - VTE ppx w/ Lovenox  - possible floor

## 2018-03-19 NOTE — PROVIDER CONTACT NOTE (OTHER) - BACKGROUND
Admitted s/p fall at home. Tx from outside hospital. type 2 dens fx, transverse body fx, and displaced  sternal fx

## 2018-03-19 NOTE — PHYSICAL THERAPY INITIAL EVALUATION ADULT - STRENGTHENING, PT EVAL
GOAL: Pt would improve BUE/BLE strength to at least 4+/5 in 4 wks, in order to complete all functional activities safely

## 2018-03-19 NOTE — CHART NOTE - NSCHARTNOTEFT_GEN_A_CORE
GENERAL SURGERY FLOOR TRANSFER NOTE    89y Female transferred to floor from SICU    SUBJECTIVE:  Patient seen and examined on surgical mary after being transferred from SICU.  Currently wearing neck brace, states she has neck pain (baseline) and is hungry.  No new complaints at this time. Denies chestpain, n/v.  Systolic Blood pressure was 170s at time of arriving to the wards, however patient was currently in pain and due for pain medications.  Per hospital course, she has had elevated SBP since admission, often associated w/ pain; spoke to RN, patient and her daughter (at bedside) and will repeat vitals 30 mins after pain meds are administered (approx 30 mins from evaluation).    OBJECTIVE:    T(C): 36.7 (03-19-18 @ 15:00), Max: 36.8 (03-18-18 @ 19:00)  HR: 79 (03-19-18 @ 17:00) (69 - 95)  BP: 165/108 (03-19-18 @ 17:00) (131/65 - 180/106)  RR: 14 (03-19-18 @ 17:00) (13 - 32)  SpO2: 98% (03-19-18 @ 17:00) (92% - 100%)  Wt(kg): --      I&O's Summary    18 Mar 2018 07:01  -  19 Mar 2018 07:00  --------------------------------------------------------  IN: 1600 mL / OUT: 600 mL / NET: 1000 mL    19 Mar 2018 07:01  -  19 Mar 2018 18:13  --------------------------------------------------------  IN: 0 mL / OUT: 1 mL / NET: -1 mL                              10.3   6.5   )-----------( 168      ( 19 Mar 2018 02:53 )             30.2       03-19    137  |  100  |  23  ----------------------------<  105<H>  4.1   |  26  |  0.78    Ca    9.5      19 Mar 2018 02:53  Phos  2.7     03-19  Mg     1.8     03-19    TPro  7.6  /  Alb  3.9  /  TBili  0.5  /  DBili  x   /  AST  28  /  ALT  23  /  AlkPhos  75  03-17      MEDICATIONS  (STANDING):  acetaminophen   Tablet. 650 milliGRAM(s) Oral every 6 hours  cholecalciferol 2000 Unit(s) Oral two times a day  cyanocobalamin 100 MICROGram(s) Oral daily  docusate sodium 100 milliGRAM(s) Oral three times a day  enoxaparin Injectable 30 milliGRAM(s) SubCutaneous every 24 hours  famotidine    Tablet 20 milliGRAM(s) Oral daily  folic acid 0.4 milliGRAM(s) Oral daily  ketorolac   Injectable 15 milliGRAM(s) IV Push every 6 hours  levothyroxine 12.5 MICROGram(s) Oral daily  lidocaine   Patch 1 Patch Transdermal every 24 hours  polyethylene glycol 3350 17 Gram(s) Oral daily  senna 2 Tablet(s) Oral at bedtime    MEDICATIONS  (PRN):  traMADol 25 milliGRAM(s) Oral every 6 hours PRN Moderate Pain (4 - 6)        FOCUSED PHYSICAL EXAM:  HEENT: Hard cervical collar in place  Chest: Sternal tenderness.   Abd: Softly distended abdomen, nontender.     ASSESSMENT: 89y F s/p fall from standing, now with Type 2 dens fracture, T4 transverse body fracture, displaced sternal fracture with inspiration limited by pain, and L1 compression fracture.    - C- collar. Non-operative intervention for Type 2 dens fracture  - TLSO Brace for thoracic and lumbar spinal fractures  - Regular diet, bowel regimen  - VTE ppx w/ Lovenox  - Rolling walker (provided by PT) for assisted ambulation  - Repeat vitals in 30 mins (~18:45 tonight); f/u BP for hypertension (likely secondary to pain).

## 2018-03-19 NOTE — CHART NOTE - NSCHARTNOTEFT_GEN_A_CORE
Fit and apply [place]  4 cervical thoracic orthosis. After discussion with trauma decision to use  rather than TLSO.  Reviewed application skin precautions and care with daughter. Written instructions and contact information given to family. To notify office with any issues questions or concerns.  Bill AMADOR  [facility]  [phone]

## 2018-03-19 NOTE — PROGRESS NOTE ADULT - ASSESSMENT
ASSESSMENT: 89 yo woman s/p fall from standing, now with Type 2 dens fracture, T4 transverse body fracture, displaced sternal fracture with inspiration limited by pain, and L1 compression fracture.    PLAN:  Neuro: Type 2 dens fracture, L1 compression fracture, T4 transverse body fracture, acute pain  - pain control w/ Tylenol, lidoderm patch, Toradol  - Pt to remain in C- collar. Non-operative intervention for Type 2 dens fracture  - TLSO Brace for thoracic and lumbar spinal fractures    Resp: inspiration limited by pain of sternal fracture  - Incentive spirometry, OOB, ambulate as tolerated with assistance to prevent atelectasis 2/2 poor effort  - Nasal cannula to keep SpO2 >92%    CV: no acute issues  - Hemodynamically stable, not requiring pressor support    GI: no acute issues  - Regular diet, bowel regimen  - Continue Pepcid for GERD    : no acute issues  - Monitor UOP  - Replete lytes PRN    Heme: VTE prophylaxis  - VTE ppx w/ Lovenox    ID: no acute issues  - Culture if febrile    Endo: hypothyroidism  - Continue levothyroxine  - Monitor glucose on BMP    Dispo: Full code. Consider transfer to floor.    -Karol Melara PA-C  [id number]

## 2018-03-19 NOTE — PHYSICAL THERAPY INITIAL EVALUATION ADULT - LIVES WITH, PROFILE
as per pt and daughter at bedside, pt lives c daughter, PH, 6 steps to enter, 7 steps inside house to get to bedroom. PTA, pt independent with all ADLs and functional activities with RW. daughter assist pt c bathing. as per pt and daughter at bedside, pt lives c daughter, PH, 6 steps to enter, 7 steps inside house to get to bedroom. PTA, pt independent with all ADLs and functional activities with RW. daughter assist pt c bathing. Pt owns rolling walker, shower chair, grab bars, and commode

## 2018-03-19 NOTE — PHYSICAL THERAPY INITIAL EVALUATION ADULT - PERTINENT HX OF CURRENT PROBLEM, REHAB EVAL
89yoF, pmhx below. presented as a transfer from an OSH for a fall at home. pt was attempted to get her shoes on when she slipped. CT indicated Type 2 dens fracture, T4 transverse body fracture and a displaced sternal fracture. At baseline, pt performs most of her ADL; she is able to use the bathroom and cook independently.

## 2018-03-19 NOTE — PROGRESS NOTE ADULT - SUBJECTIVE AND OBJECTIVE BOX
24 h: Pt admitted to SICU after a fall. She had issues with pain control and was started on standing Tylenol and Toradol. She tolerated a regular diet and was IV locked overnight. Bill Beaver was consulted for a fitted C-collar/TLSO brace. 24 HOUR EVENTS: Pt admitted to SICU after a fall. She had issues with pain control and was started on standing Tylenol and Toradol. She tolerated a regular diet and was IV locked overnight. Bill Beaver was consulted for a fitted C-collar/TLSO brace.    HISTORY  LYNN ANDRADE is a 89y Female with PMH diverticulitis s/p SBR (2014), s/p lap sandoval (2017), osteoporosis, GERD presents as a transfer from an outside hospital following fall. She was sitting in her chair attempting to reach her shoes and fell forward. She says she hit her head but is not quite sure if she lost consciousness. She and her daughter (at bedside) describe that she then called her daughter, who called the neighbor to assist her. She was taken to the OSH, noted to have a displaced sternal fracture, a  Type 2 dens fracture, T4 vertebral body fracture and transferred to [facility] for further Trauma care.     SUBJECTIVE/ROS:   [x ] A ten-point review of systems was otherwise negative except as noted.  [ ] Due to altered mental status/intubation, subjective information were not able to be obtained from the patient. History was obtained, to the extent possible, from review of the chart and collateral sources of information.      NEURO  RASS:   0  GCS:   15   Exam: awake, alert and oriented x3, no focal deficits  Meds: acetaminophen   Tablet. 650 milliGRAM(s) Oral every 6 hours  ketorolac   Injectable 15 milliGRAM(s) IV Push every 6 hours    [x] Adequacy of sedation and pain control has been assessed and adjusted      RESPIRATORY  RR: 18 (03-19-18 @ 04:00) (12 - 32)  SpO2: 97% (03-19-18 @ 04:00) (95% - 99%)  Exam: unlabored, clear to auscultation bilaterally  [n/a ] Extubation Readiness Assessed  Meds: x      CARDIOVASCULAR  HR: 75 (03-19-18 @ 04:00) (69 - 91)  BP: 172/76 (03-19-18 @ 04:00) (124/71 - 195/90)  BP(mean): 109 (03-19-18 @ 04:00) (88 - 129)    Exam: regular rate and rhythm  Cardiac Rhythm: sinus  Perfusion     [x ]Adequate   [ ]Inadequate  Mentation   [x ]Normal       [ ]Reduced  Extremities  [x ]Warm         [ ]Cool  Volume Status [ ]Hypervolemic [ x]Euvolemic [ ]Hypovolemic  Meds: x      GI/NUTRITION  Exam: soft, nontender, nondistended  Diet: Regular  Meds: docusate sodium 100 milliGRAM(s) Oral three times a day  famotidine    Tablet 20 milliGRAM(s) Oral daily  polyethylene glycol 3350 17 Gram(s) Oral daily  senna 2 Tablet(s) Oral at bedtime      GENITOURINARY  I&O's Detail    03-18 @ 07:01  -  03-19 @ 05:31  --------------------------------------------------------  IN:    IV PiggyBack: 100 mL    Oral Fluid: 800 mL    sodium chloride 0.9%: 450 mL    Solution: 250 mL  Total IN: 1600 mL    OUT:    Voided: 600 mL  Total OUT: 600 mL    Total NET: 1000 mL        Weight (kg): 42 (03-18 @ 06:45)  03-19    137  |  100  |  23  ----------------------------<  105<H>  4.1   |  26  |  0.78    Ca    9.5      19 Mar 2018 02:53  Phos  2.7     03-19  Mg     1.8     03-19    TPro  7.6  /  Alb  3.9  /  TBili  0.5  /  DBili  x   /  AST  28  /  ALT  23  /  AlkPhos  75  03-17    [ n/a] Em catheter, indication: N/A  Meds: cholecalciferol 2000 Unit(s) Oral two times a day  cyanocobalamin 100 MICROGram(s) Oral daily  folic acid 0.4 milliGRAM(s) Oral daily        HEMATOLOGIC  Meds: enoxaparin Injectable 30 milliGRAM(s) SubCutaneous every 24 hours    [x] VTE Prophylaxis                        10.3   6.5   )-----------( 168      ( 19 Mar 2018 02:53 )             30.2     PT/INR - ( 17 Mar 2018 20:13 )   PT: 10.8 sec;   INR: 0.99 ratio         PTT - ( 17 Mar 2018 20:13 )  PTT:27.8 sec  Transfusion     [ ] PRBC   [ ] Platelets   [ ] FFP   [ ] Cryoprecipitate      INFECTIOUS DISEASES  T(C): 36.8 (03-18-18 @ 23:00), Max: 37 (03-18-18 @ 11:00)  WBC Count: 6.5 K/uL (03-19 @ 02:53)  Recent Cultures: x  Meds: x    ENDOCRINE  Capillary Blood Glucose  Meds: levothyroxine 12.5 MICROGram(s) Oral daily        ACCESS DEVICES:  [x ] Peripheral IV  [ ] Central Venous Line	[ ] R	[ ] L	[ ] IJ	[ ] Fem	[ ] SC	Placed:   [ ] Arterial Line		[ ] R	[ ] L	[ ] Fem	[ ] Rad	[ ] Ax	Placed:   [ ] PICC:					[ ] Mediport  [ ] Urinary Catheter, Date Placed:   [x ] Necessity of urinary, arterial, and venous catheters discussed    OTHER MEDICATIONS:  lidocaine   Patch 1 Patch Transdermal every 24 hours      CODE STATUS: full code    IMAGING: x

## 2018-03-19 NOTE — PHYSICAL THERAPY INITIAL EVALUATION ADULT - GAIT DEVIATIONS NOTED, PT EVAL
decreased step length/decreased velocity of limb motion/decreased stride length/decreased jose/decreased weight-shifting ability

## 2018-03-19 NOTE — PHYSICAL THERAPY INITIAL EVALUATION ADULT - PRECAUTIONS/LIMITATIONS, REHAB EVAL
CXR The heart is slightly enlarged. The lungs are clear. No acute fractures could be identified however if clinically warranted dedicated rib films should be obtained. No pneumothorax./fall precautions

## 2018-03-20 ENCOUNTER — TRANSCRIPTION ENCOUNTER (OUTPATIENT)
Age: 83
End: 2018-03-20

## 2018-03-20 DIAGNOSIS — R03.0 ELEVATED BLOOD-PRESSURE READING, WITHOUT DIAGNOSIS OF HYPERTENSION: ICD-10-CM

## 2018-03-20 DIAGNOSIS — E03.9 HYPOTHYROIDISM, UNSPECIFIED: ICD-10-CM

## 2018-03-20 DIAGNOSIS — K59.00 CONSTIPATION, UNSPECIFIED: ICD-10-CM

## 2018-03-20 DIAGNOSIS — Z29.9 ENCOUNTER FOR PROPHYLACTIC MEASURES, UNSPECIFIED: ICD-10-CM

## 2018-03-20 DIAGNOSIS — K21.9 GASTRO-ESOPHAGEAL REFLUX DISEASE WITHOUT ESOPHAGITIS: ICD-10-CM
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
## 2018-03-20 DIAGNOSIS — S12.100A UNSPECIFIED DISPLACED FRACTURE OF SECOND CERVICAL VERTEBRA, INITIAL ENCOUNTER FOR CLOSED FRACTURE: ICD-10-CM

## 2018-03-20 LAB
ANION GAP SERPL CALC-SCNC: 11 MMOL/L — SIGNIFICANT CHANGE UP (ref 5–17)
BUN SERPL-MCNC: 20 MG/DL — SIGNIFICANT CHANGE UP (ref 7–23)
CALCIUM SERPL-MCNC: 9.5 MG/DL — SIGNIFICANT CHANGE UP (ref 8.4–10.5)
CHLORIDE SERPL-SCNC: 99 MMOL/L — SIGNIFICANT CHANGE UP (ref 96–108)
CO2 SERPL-SCNC: 26 MMOL/L — SIGNIFICANT CHANGE UP (ref 22–31)
CREAT SERPL-MCNC: 0.64 MG/DL — SIGNIFICANT CHANGE UP (ref 0.5–1.3)
GLUCOSE SERPL-MCNC: 122 MG/DL — HIGH (ref 70–99)
HCT VFR BLD CALC: 31.7 % — LOW (ref 34.5–45)
HGB BLD-MCNC: 10.9 G/DL — LOW (ref 11.5–15.5)
MAGNESIUM SERPL-MCNC: 1.8 MG/DL — SIGNIFICANT CHANGE UP (ref 1.6–2.6)
MCHC RBC-ENTMCNC: 31.9 PG — SIGNIFICANT CHANGE UP (ref 27–34)
MCHC RBC-ENTMCNC: 34.4 GM/DL — SIGNIFICANT CHANGE UP (ref 32–36)
MCV RBC AUTO: 92.7 FL — SIGNIFICANT CHANGE UP (ref 80–100)
NRBC # BLD: 0 /100 WBCS — SIGNIFICANT CHANGE UP (ref 0–0)
PHOSPHATE SERPL-MCNC: 2.4 MG/DL — LOW (ref 2.5–4.5)
PLATELET # BLD AUTO: 192 K/UL — SIGNIFICANT CHANGE UP (ref 150–400)
POTASSIUM SERPL-MCNC: 3.5 MMOL/L — SIGNIFICANT CHANGE UP (ref 3.5–5.3)
POTASSIUM SERPL-SCNC: 3.5 MMOL/L — SIGNIFICANT CHANGE UP (ref 3.5–5.3)
RBC # BLD: 3.42 M/UL — LOW (ref 3.8–5.2)
RBC # FLD: 14.1 % — SIGNIFICANT CHANGE UP (ref 10.3–14.5)
SODIUM SERPL-SCNC: 136 MMOL/L — SIGNIFICANT CHANGE UP (ref 135–145)
WBC # BLD: 6.28 K/UL — SIGNIFICANT CHANGE UP (ref 3.8–10.5)
WBC # FLD AUTO: 6.28 K/UL — SIGNIFICANT CHANGE UP (ref 3.8–10.5)

## 2018-03-20 PROCEDURE — 99223 1ST HOSP IP/OBS HIGH 75: CPT

## 2018-03-20 PROCEDURE — 99231 SBSQ HOSP IP/OBS SF/LOW 25: CPT

## 2018-03-20 PROCEDURE — 71045 X-RAY EXAM CHEST 1 VIEW: CPT | Mod: 26

## 2018-03-20 RX ORDER — SODIUM,POTASSIUM PHOSPHATES 278-250MG
1 POWDER IN PACKET (EA) ORAL
Qty: 0 | Refills: 0 | Status: DISCONTINUED | OUTPATIENT
Start: 2018-03-20 | End: 2018-03-21

## 2018-03-20 RX ORDER — AMLODIPINE BESYLATE 2.5 MG/1
2.5 TABLET ORAL DAILY
Qty: 0 | Refills: 0 | Status: DISCONTINUED | OUTPATIENT
Start: 2018-03-20 | End: 2018-03-22

## 2018-03-20 RX ADMIN — FAMOTIDINE 20 MILLIGRAM(S): 10 INJECTION INTRAVENOUS at 13:59

## 2018-03-20 RX ADMIN — Medication 15 MILLIGRAM(S): at 22:14

## 2018-03-20 RX ADMIN — Medication 0.4 MILLIGRAM(S): at 14:00

## 2018-03-20 RX ADMIN — Medication 15 MILLIGRAM(S): at 11:00

## 2018-03-20 RX ADMIN — Medication 650 MILLIGRAM(S): at 23:59

## 2018-03-20 RX ADMIN — LIDOCAINE 1 PATCH: 4 CREAM TOPICAL at 01:26

## 2018-03-20 RX ADMIN — Medication 650 MILLIGRAM(S): at 13:59

## 2018-03-20 RX ADMIN — Medication 100 MILLIGRAM(S): at 13:59

## 2018-03-20 RX ADMIN — Medication 15 MILLIGRAM(S): at 00:06

## 2018-03-20 RX ADMIN — Medication 15 MILLIGRAM(S): at 10:40

## 2018-03-20 RX ADMIN — Medication 650 MILLIGRAM(S): at 18:00

## 2018-03-20 RX ADMIN — Medication 100 MILLIGRAM(S): at 00:05

## 2018-03-20 RX ADMIN — Medication 1 TABLET(S): at 17:25

## 2018-03-20 RX ADMIN — Medication 100 MILLIGRAM(S): at 22:14

## 2018-03-20 RX ADMIN — Medication 100 MILLIGRAM(S): at 07:15

## 2018-03-20 RX ADMIN — AMLODIPINE BESYLATE 2.5 MILLIGRAM(S): 2.5 TABLET ORAL at 17:25

## 2018-03-20 RX ADMIN — Medication 650 MILLIGRAM(S): at 17:25

## 2018-03-20 RX ADMIN — PREGABALIN 100 MICROGRAM(S): 225 CAPSULE ORAL at 13:59

## 2018-03-20 RX ADMIN — Medication 12.5 MICROGRAM(S): at 07:14

## 2018-03-20 RX ADMIN — Medication 2000 UNIT(S): at 17:27

## 2018-03-20 RX ADMIN — ENOXAPARIN SODIUM 30 MILLIGRAM(S): 100 INJECTION SUBCUTANEOUS at 13:58

## 2018-03-20 RX ADMIN — Medication 650 MILLIGRAM(S): at 00:06

## 2018-03-20 RX ADMIN — Medication 15 MILLIGRAM(S): at 13:59

## 2018-03-20 RX ADMIN — SENNA PLUS 2 TABLET(S): 8.6 TABLET ORAL at 00:06

## 2018-03-20 RX ADMIN — Medication 2000 UNIT(S): at 07:15

## 2018-03-20 RX ADMIN — Medication 650 MILLIGRAM(S): at 14:59

## 2018-03-20 RX ADMIN — Medication 650 MILLIGRAM(S): at 07:15

## 2018-03-20 RX ADMIN — Medication 15 MILLIGRAM(S): at 14:59

## 2018-03-20 RX ADMIN — POLYETHYLENE GLYCOL 3350 17 GRAM(S): 17 POWDER, FOR SOLUTION ORAL at 13:59

## 2018-03-20 NOTE — DISCHARGE NOTE ADULT - PATIENT PORTAL LINK FT
You can access the [facility] Patient Portal, offered by [facility], by registering with the following website: [email or web address]

## 2018-03-20 NOTE — OCCUPATIONAL THERAPY INITIAL EVALUATION ADULT - BALANCE TRAINING, PT EVAL
Patient will increase static/dynamic sitting/standing balance by 1/2 grade to facilitate increased ability to perform ADLs and functional mobility

## 2018-03-20 NOTE — DISCHARGE NOTE ADULT - MEDICATION SUMMARY - MEDICATIONS TO TAKE
I will START or STAY ON the medications listed below when I get home from the hospital:    Probiotic  -- 1 tab(s) by mouth once a day  -- Indication: For Home medication    acetaminophen 325 mg oral tablet  -- 2 tab(s) by mouth every 6 hours  -- Indication: For Home medication    Aspirin Enteric Coated 81 mg oral delayed release tablet  -- 1 tab(s) by mouth once a day  -- Indication: For Home medication    traMADol 50 mg oral tablet  -- 37.5 milligram(s) by mouth every 4 hours, As Needed   -- Indication: For Home medication    amLODIPine 2.5 mg oral tablet  -- 1 tab(s) by mouth once a day  -- Indication: For High blood pressure medication    lidocaine 5% topical film  -- Apply on skin to affected area once a day PRN pain  -- Indication: For Pain medication    Zantac 300 oral tablet  -- 1 tab(s) by mouth once a day (at bedtime)  -- Indication: For Home medication    Slow Fe (as elemental iron) 45 mg oral tablet, extended release  -- 1 tab(s) by mouth once a day  -- Indication: For Home medication    MiraLax oral powder for reconstitution  -- 1 cap(s) by mouth every other day  -- Indication: For Laxative    docusate sodium 100 mg oral capsule  -- 1 cap(s) by mouth 3 times a day  -- Indication: For Laxative    senna oral tablet  -- 2 tab(s) by mouth once a day (at bedtime)  -- Indication: For Laxative    sodium chloride 0.65% nasal spray  -- 1 spray(s) into nose 4 times a day, As Needed  -- Indication: For Nose Spray for congestion    Systane Balance ophthalmic solution  -- 2 drop(s) to each affected eye 2 times a day  -- Indication: For eye drops    levothyroxine  -- 12.5 microgram(s) by mouth once a day  -- Indication: For Home medication    Vitamin B Complex 100  -- 1 tab(s) by mouth once a day  -- Indication: For Vitamin    PreserVision AREDS 2 oral capsule  -- 1 cap(s) by mouth 2 times a day  -- Indication: For Home medication    Calcium 600+D  -- 1 tab(s) by mouth 2 times a day  -- Indication: For Vitamin    Centrum oral tablet  -- 1 tab(s) by mouth once a day  -- Indication: For Vitamin    Vitamin D3 2000 intl units oral tablet  -- 1 tab(s) by mouth 2 times a day  -- Indication: For Vitamin    Vitamin B12  -- 1 tab(s) by mouth once a day  -- Indication: For Vitamin    folic acid 0.4 mg oral tablet  -- 1 tab(s) by mouth once a day  -- Indication: For Vitamin

## 2018-03-20 NOTE — PROVIDER CONTACT NOTE (OTHER) - ASSESSMENT
pt a/o x  3 and resting comfortably in bed with pain management in place. pt tolerating small meals, has cervical collar in place.
AOx4. Follows commands. Pain in sternal fx region. No s/s of resp distress on 2L NC. SBP 180s HR 70s. O2 sat >93  %

## 2018-03-20 NOTE — OCCUPATIONAL THERAPY INITIAL EVALUATION ADULT - PRECAUTIONS/LIMITATIONS, REHAB EVAL
spinal precautions/fall precautions/+C-collar at all times, TLSO brace fall precautions/+C-collar at all times, +Clifford  brace/spinal precautions

## 2018-03-20 NOTE — OCCUPATIONAL THERAPY INITIAL EVALUATION ADULT - ADL RETRAINING, OT EVAL
Pt with perform LB dressing independently in two weeks. Pt will be independent with toileting in two weeks. Patient will be independent with UB dressing within two weeks

## 2018-03-20 NOTE — CONSULT NOTE ADULT - ATTENDING COMMENTS
Patient seen and examined on AM rounds on 3/18/18 and agree with above.  89y Female with PMH diverticulitis s/p SBR (2014), s/p lap sandoval (2017), osteoporosis, GERD who fell forward with resulting injuries including a displaced sternal fracture without a mediastinal  hematoma, C2 dens fracture, T4 vertebral fracture and L1 compression fracture which is chronic appearing but now resulting in bony retropulsion into the spinal canal with associated spinal canal stenosis.   The patient complains of pain along the sternal fracture as well as posterior neck pain.   She is hemodynamically appropriate.  On physical exam she has a cervical spine collar on as well as pain over sternum.  Cardiopulmonary exam is within normal and abdomen is soft and nondistended.  Her legs have chronic pain as per patient and daughter.  Labs reviewed as well as imaging.  Patient admitted to the SICU for close neurologic monitoring with q1 neurochecks.   Will restart synthroid for hypothyroidism.   TLSO for comfort.  PT as tolerated.  I discussed the plan with her daughter and son who understand and agree with management.  I also discussed goals of care and she has expressed being full code.
D/w daughter at bedside updated on plan of care

## 2018-03-20 NOTE — DISCHARGE NOTE ADULT - CARE PROVIDERS DIRECT ADDRESSES
[email or web address]

## 2018-03-20 NOTE — CONSULT NOTE ADULT - PROBLEM SELECTOR RECOMMENDATION 9
Pt found to have Type 2 dens fracture, L1 compression fracture, T4 transverse body fracture  Pain control with Tylenol, lidoderm patch, Toradol  Pt to remain in C- collar. Non-operative intervention for Type 2 dens fracture  TLSO Brace for thoracic and lumbar spinal fractures  PT recommending DAVID  Sx follow up

## 2018-03-20 NOTE — DISCHARGE NOTE ADULT - CARE PLAN
Principal Discharge DX:	Dens fracture  Goal:	Non-operative management for dens, sternum and thoracic vertebral body fractures  Assessment and plan of treatment:	1. Please follow-up with neurosurgery as an outpatient for management of dens and vertebral body fractures. The dens fracture is unstable, thus neck collar must be worn at all times. Please follow-up with Dr. Priscilla Sin. Call to schedule appointment.   2. Please follow-up PRN with Dr. Montejo for sternal fracture concerns. Principal Discharge DX:	Dens fracture  Goal:	Non-operative management for dens, sternum and thoracic vertebral body fractures  Assessment and plan of treatment:	1. Please follow-up with neurosurgery as an outpatient for management of dens and vertebral body fractures. The dens fracture is unstable, thus neck collar must be worn at all times. Please follow-up with Dr. Priscilla Sin. Call to schedule appointment.   2. Please follow-up PRN with Dr. Montejo for sternal fracture concerns.  Secondary Diagnosis:	Blood pressure elevated without history of HTN  Goal:	Continue BP medications as written and follow up with you primary care doctor. Principal Discharge DX:	Dens fracture  Goal:	Non-operative management for dens, sternum and thoracic vertebral body fractures  Assessment and plan of treatment:	1. Please follow-up with neurosurgery as an outpatient for management of dens and vertebral body fractures. The dens fracture is unstable, thus neck collar must be worn at all times. Please follow-up with Dr. Priscilla Sin. Call to schedule appointment.   2. Please follow-up PRN with Dr. Montejo for sternal fracture concerns.  Secondary Diagnosis:	Blood pressure elevated without history of HTN  Goal:	Continue BP medications as written and follow up with you primary care doctor.  Assessment and plan of treatment:	Follow up with your PMD Dr. Vamshi Bryan in 2 weeks. #[phone]. Principal Discharge DX:	Dens fracture  Goal:	Non-operative management for dens, sternum and thoracic vertebral body fractures  Assessment and plan of treatment:	1. Please follow-up with neurosurgery as an outpatient for management of dens and vertebral body fractures. The dens fracture is unstable, thus neck collar must be worn at all times. Please follow-up with Dr. Priscilla Sin. Call to schedule appointment.   2. Please follow-up PRN with Dr. Montejo for sternal fracture concerns.  Secondary Diagnosis:	Blood pressure elevated without history of HTN  Goal:	Continue BP medications as written and follow up with you primary care doctor.  Assessment and plan of treatment:	Follow up with your [facility] Doctor, Dr. Vamshi Bryan in 2 weeks. #[phone].

## 2018-03-20 NOTE — DISCHARGE NOTE ADULT - CARE PROVIDER_API CALL
Priscilla Sin (MD; PhD), Neurological Surgery  [address] [address]  Phone: [phone]  Fax: [phone] Priscilla Sin; PhD), Neurological Surgery  [address] [facility] [address]  [place] [address]  Phone: [phone]  Fax: [phone]    Clint Montejo), Surgery; Surgical Critical Care  [address] [place] [address]  Phone: [phone]  Fax: [phone] Priscilla Sin; PhD), Neurological Surgery  [address] [facility] [address]  [place] [address]  Phone: [phone]  Fax: [phone]    Clint Montejo), Surgery; Surgical Critical Care  [address] [place] [address]  Phone: [phone]  Fax: [phone]

## 2018-03-20 NOTE — PROGRESS NOTE ADULT - SUBJECTIVE AND OBJECTIVE BOX
Trauma Surgery Progress Note     Subjective/24hour Events: No acute events overnight. Transferred to floors. Pain controlled. Passing flatus. Tolerating diet    Vital Signs:  Vital Signs Last 24 Hrs  T(C): 36.8 (20 Mar 2018 05:10), Max: 36.8 (19 Mar 2018 21:48)  T(F): 98.2 (20 Mar 2018 05:10), Max: 98.3 (19 Mar 2018 21:48)  HR: 95 (20 Mar 2018 05:10) (76 - 95)  BP: 161/105 (20 Mar 2018 05:10) (154/95 - 180/106)  BP(mean): 131 (19 Mar 2018 17:00) (114 - 135)  RR: 16 (20 Mar 2018 05:10) (13 - 20)  SpO2: 94% (20 Mar 2018 05:10) (92% - 100%)    CAPILLARY BLOOD GLUCOSE          I&O's Detail    19 Mar 2018 07:01  -  20 Mar 2018 07:00  --------------------------------------------------------  IN:  Total IN: 0 mL    OUT:    Voided: 301 mL  Total OUT: 301 mL    Total NET: -301 mL            MEDICATIONS  (STANDING):  acetaminophen   Tablet. 650 milliGRAM(s) Oral every 6 hours  cholecalciferol 2000 Unit(s) Oral two times a day  cyanocobalamin 100 MICROGram(s) Oral daily  docusate sodium 100 milliGRAM(s) Oral three times a day  enoxaparin Injectable 30 milliGRAM(s) SubCutaneous every 24 hours  famotidine    Tablet 20 milliGRAM(s) Oral daily  folic acid 0.4 milliGRAM(s) Oral daily  ketorolac   Injectable 15 milliGRAM(s) IV Push every 6 hours  levothyroxine 12.5 MICROGram(s) Oral daily  lidocaine   Patch 1 Patch Transdermal every 24 hours  polyethylene glycol 3350 17 Gram(s) Oral daily  senna 2 Tablet(s) Oral at bedtime    MEDICATIONS  (PRN):  traMADol 25 milliGRAM(s) Oral every 6 hours PRN Moderate Pain (4 - 6)        Physical Exam:  Gen: NAD, collar  Abd: NT/ND, soft      Labs:    03-19    137  |  100  |  23  ----------------------------<  105<H>  4.1   |  26  |  0.78    Ca    9.5      19 Mar 2018 02:53  Phos  2.7     03-19  Mg     1.8     03-19                              10.3   6.5   )-----------( 168      ( 19 Mar 2018 02:53 )             30.2     ASSESSMENT: 89y F s/p fall from standing, now with Type 2 dens fracture, T4 transverse body fracture, displaced sternal fracture with inspiration limited by pain, and L1 compression fracture.    - C- collar. Non-operative intervention for Type 2 dens fracture  - TLSO Brace for thoracic and lumbar spinal fractures  - Regular diet, bowel regimen  - VTE ppx w/ Lovenox  - dispo DAVID

## 2018-03-20 NOTE — DISCHARGE NOTE ADULT - PLAN OF CARE
Non-operative management for dens, sternum and thoracic vertebral body fractures 1. Please follow-up with neurosurgery as an outpatient for management of dens and vertebral body fractures. The dens fracture is unstable, thus neck collar must be worn at all times. Please follow-up with Dr. Priscilla Sin. Call to schedule appointment.   2. Please follow-up PRN with Dr. Montejo for sternal fracture concerns. Continue BP medications as written and follow up with you primary care doctor. Follow up with your PMD Dr. Vamshi Bryan in 2 weeks. #[phone]. Follow up with your [facility] Doctor, Dr. Vamshi Bryan in 2 weeks. #[phone].

## 2018-03-20 NOTE — OCCUPATIONAL THERAPY INITIAL EVALUATION ADULT - ADDITIONAL COMMENTS
CXR The heart is slightly enlarged. The lungs are clear. No acute fractures could be identified however if clinically warranted dedicated rib films should be obtained. No pneumothorax.; fall precautions

## 2018-03-20 NOTE — DISCHARGE NOTE ADULT - HOSPITAL COURSE
LYNN NADRADE is a 89y Female with PMH diverticulitis s/p SBR (2014), s/p lap sandoval (2017), osteoporosis, GERD who presented as a transfer from an outside hospital following fall. She was sitting in her chair attempting to reach her shoes and fell forward. She says she hit her head but is not quite sure if she lost consciousness. She and her daughter describe that she then called her daughter, who called the neighbor to assist her. She was taken to the OSH, noted to have a displaced sternal fracture, a Type 2 dens fracture, T4 vertebral body fracture and transferred to [facility] for further Trauma care. Patient was subsequently admitted to the Surgical ICU for neuro and respiratory monitoring, the latter for difficulty inspiring due to pain. The patient remained hemodynamically stable and was transferred to the floors, where she remained stable hemodynamically and neurologically with no focal deficits. Patient received neck brace that she is to remain in at all times. Physical therapy worked with patient and recommended subacute rehab. Patient is now stable, voiding, tolerating diet and stable for discharge. LYNN ANDRADE is a 89y Female with PMH diverticulitis s/p SBR (2014), s/p lap sandoval (2017), osteoporosis, GERD who presented as a transfer from an outside hospital following fall. She was sitting in her chair attempting to reach her shoes and fell forward. She says she hit her head but is not quite sure if she lost consciousness. She and her daughter describe that she then called her daughter, who called the neighbor to assist her. She was taken to the OSH, noted to have a displaced sternal fracture, a Type 2 dens fracture, T4 vertebral body fracture and transferred to [facility] for further Trauma care. Patient was subsequently admitted to the Surgical ICU for neuro and respiratory monitoring, the latter for difficulty inspiring due to pain. The patient remained hemodynamically stable and was transferred to the floors, where she remained stable hemodynamically and neurologically with no focal deficits. Patient received neck brace that she is to remain in at all times. Physical therapy worked with patient and recommended subacute rehab. Medicine was consulted and started Norvasc for HTN. Patient is now stable, voiding, tolerating diet and stable for discharge.

## 2018-03-20 NOTE — DISCHARGE NOTE ADULT - ADDITIONAL INSTRUCTIONS
Please keep neck collar on at all times Please keep neck collar on at all times  Please call for a follow up appointment with your primary care medical doctor upon discharge regarding your recent hospitalization.

## 2018-03-20 NOTE — CONSULT NOTE ADULT - PROBLEM SELECTOR RECOMMENDATION 6
Pt has multiple episodes of elevated pressures likely in the setting of pain/ trauma  Reports pain is better today  If necessary would initiate norvasc 2.5mg qd for BP control   Goal BP <150/90

## 2018-03-20 NOTE — PROVIDER CONTACT NOTE (OTHER) - ACTION/TREATMENT ORDERED:
will discuss with team and notify RN of plan continue to monitor at this time
Tramadol ordred. No further interventions at this time. SICU team aware of pts SBP. Will continue to monitor

## 2018-03-20 NOTE — CONSULT NOTE ADULT - PROBLEM SELECTOR RECOMMENDATION 4
Pt with fall likely in the setting of osteoarthritis  Continue vitamin d supplementation for secondary fall prevention   Pt on vitamin d 4000u/day  Check vitamin d level

## 2018-03-20 NOTE — CONSULT NOTE ADULT - ASSESSMENT
89 year old female with hx of hypothyroid, s/p fall from standing, now with Type 2 dens fracture, T4 transverse body fracture, displaced sternal fracture, and L1 compression fracture.

## 2018-03-20 NOTE — CONSULT NOTE ADULT - SUBJECTIVE AND OBJECTIVE BOX
HPI:  89y Female with PMH diverticulitis s/p SBR (2014), s/p katy nick (2017), osteoporosis, GERD presents as a transfer from an outside hospital following fall. She was sitting in her chair attempting to reach her shoes and fell forward. She says she hit her head but is not quite sure if she lost consciousness. She and her daughter (at bedside) describe that she then called her daughter, who called the neighbor to assist her. She was taken to the OSH, noted to have a displaced sternal fracture, a  Type 2 dens fracture, T4 vertebral body fracture and transferred to [facility] for further Trauma care. At baseline, patient performs most of her ADL; she is able to use the bathroom and cook independently. Patient evaluated with her daughter at bedside who provided collateral information.     PAST MEDICAL & SURGICAL HISTORY:  Diverticulitis  Pelvic fracture  Fall  Anemia  Constipation  Osteoporosis  Diverticulitis  GERD (gastroesophageal reflux disease)  Hypothyroid  Reflux  Bronchitis  S/P laparoscopic cholecystectomy  S/P small bowel resection  Ovarian cyst: right  S/P appendectomy  Hip fracture requiring operative repair: right      Review of Systems:   CONSTITUTIONAL: No fever, weight loss, or fatigue  EYES: No eye pain, visual disturbances, or discharge  ENMT:  Some difficulty hearing   NECK: neck region pain   RESPIRATORY: No cough, wheezing, chills or hemoptysis; No shortness of breath  CARDIOVASCULAR: No chest pain, palpitations, dizziness, or leg swelling  GASTROINTESTINAL: No abdominal or epigastric pain. No nausea, vomiting, or hematemesis; No diarrhea or constipation. No melena or hematochezia.  GENITOURINARY: No dysuria, frequency, hematuria, or incontinence  NEUROLOGICAL: No headaches,  SKIN: No itching, burning, rashes, or lesions   MUSCULOSKELETAL: arthritis   PSYCHIATRIC: No depression, anxiety, mood swings, or difficulty sleeping  ALLERY AND IMMUNOLOGIC: No hives or eczema    Allergies    No Known Allergies    Intolerances        Social History: Denies smoking, etoh use    FAMILY HISTORY:  No pertinent family history in first degree relatives      MEDICATIONS  (STANDING):  acetaminophen   Tablet. 650 milliGRAM(s) Oral every 6 hours  cholecalciferol 2000 Unit(s) Oral two times a day  cyanocobalamin 100 MICROGram(s) Oral daily  docusate sodium 100 milliGRAM(s) Oral three times a day  enoxaparin Injectable 30 milliGRAM(s) SubCutaneous every 24 hours  famotidine    Tablet 20 milliGRAM(s) Oral daily  folic acid 0.4 milliGRAM(s) Oral daily  ketorolac   Injectable 15 milliGRAM(s) IV Push every 6 hours  levothyroxine 12.5 MICROGram(s) Oral daily  lidocaine   Patch 1 Patch Transdermal every 24 hours  polyethylene glycol 3350 17 Gram(s) Oral daily  potassium acid phosphate/sodium acid phosphate tablet (K-PHOS No. 2) 1 Tablet(s) Oral two times a day before meals  senna 2 Tablet(s) Oral at bedtime    MEDICATIONS  (PRN):  traMADol 25 milliGRAM(s) Oral every 6 hours PRN Moderate Pain (4 - 6)        CAPILLARY BLOOD GLUCOSE        I&O's Summary    19 Mar 2018 07:01  -  20 Mar 2018 07:00  --------------------------------------------------------  IN: 0 mL / OUT: 301 mL / NET: -301 mL    20 Mar 2018 07:01  -  20 Mar 2018 15:27  --------------------------------------------------------  IN: 600 mL / OUT: 150 mL / NET: 450 mL        PHYSICAL EXAM:  GENERAL: NAD, frail  HEAD:  Atraumatic, Normocephalic  EYES: EOMI, PERRLA, conjunctiva and sclera clear  NECK: C collar in place   CHEST/LUNG: decreased breath sounds bl  HEART: Regular rate and rhythm; S1S2  ABDOMEN: Soft, Nontender, Nondistended; Bowel sounds present  EXTREMITIES:  2+ Peripheral Pulses, No clubbing, cyanosis, or edema  PSYCH: AAOx3  NEUROLOGY: non-focal  SKIN: No rashes or lesions    LABS:                        10.9   6.28  )-----------( 192      ( 20 Mar 2018 09:59 )             31.7     03-20    136  |  99  |  20  ----------------------------<  122<H>  3.5   |  26  |  0.64    Ca    9.5      20 Mar 2018 07:49  Phos  2.4     03-20  Mg     1.8     03-20                RADIOLOGY & ADDITIONAL TESTS:    Imaging Personally Reviewed:    Consultant(s) Notes Reviewed:  sx    Care Discussed with Consultants/Other Providers:sx

## 2018-03-20 NOTE — OCCUPATIONAL THERAPY INITIAL EVALUATION ADULT - LIVES WITH, PROFILE
Pt 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 in private house, 5 steps to enter +handrail, flight of stairs to bed/bath, +tub shower/grab bars

## 2018-03-21 DIAGNOSIS — S22.000A WEDGE COMPRESSION FRACTURE OF UNSPECIFIED THORACIC VERTEBRA, INITIAL ENCOUNTER FOR CLOSED FRACTURE: ICD-10-CM

## 2018-03-21 LAB
24R-OH-CALCIDIOL SERPL-MCNC: 74 NG/ML — SIGNIFICANT CHANGE UP (ref 30–80)
ANION GAP SERPL CALC-SCNC: 13 MMOL/L — SIGNIFICANT CHANGE UP (ref 5–17)
ANION GAP SERPL CALC-SCNC: 15 MMOL/L — SIGNIFICANT CHANGE UP (ref 5–17)
BUN SERPL-MCNC: 19 MG/DL — SIGNIFICANT CHANGE UP (ref 7–23)
BUN SERPL-MCNC: 20 MG/DL — SIGNIFICANT CHANGE UP (ref 7–23)
CALCIUM SERPL-MCNC: 9.3 MG/DL — SIGNIFICANT CHANGE UP (ref 8.4–10.5)
CALCIUM SERPL-MCNC: 9.7 MG/DL — SIGNIFICANT CHANGE UP (ref 8.4–10.5)
CHLORIDE SERPL-SCNC: 100 MMOL/L — SIGNIFICANT CHANGE UP (ref 96–108)
CHLORIDE SERPL-SCNC: 99 MMOL/L — SIGNIFICANT CHANGE UP (ref 96–108)
CO2 SERPL-SCNC: 22 MMOL/L — SIGNIFICANT CHANGE UP (ref 22–31)
CO2 SERPL-SCNC: 25 MMOL/L — SIGNIFICANT CHANGE UP (ref 22–31)
CREAT SERPL-MCNC: 0.69 MG/DL — SIGNIFICANT CHANGE UP (ref 0.5–1.3)
CREAT SERPL-MCNC: 0.7 MG/DL — SIGNIFICANT CHANGE UP (ref 0.5–1.3)
GLUCOSE SERPL-MCNC: 115 MG/DL — HIGH (ref 70–99)
GLUCOSE SERPL-MCNC: 145 MG/DL — HIGH (ref 70–99)
HCT VFR BLD CALC: 32.2 % — LOW (ref 34.5–45)
HGB BLD-MCNC: 11.3 G/DL — LOW (ref 11.5–15.5)
MCHC RBC-ENTMCNC: 32.3 PG — SIGNIFICANT CHANGE UP (ref 27–34)
MCHC RBC-ENTMCNC: 35.1 GM/DL — SIGNIFICANT CHANGE UP (ref 32–36)
MCV RBC AUTO: 92 FL — SIGNIFICANT CHANGE UP (ref 80–100)
NRBC # BLD: 0 /100 WBCS — SIGNIFICANT CHANGE UP (ref 0–0)
PLATELET # BLD AUTO: 210 K/UL — SIGNIFICANT CHANGE UP (ref 150–400)
POTASSIUM SERPL-MCNC: 3.4 MMOL/L — LOW (ref 3.5–5.3)
POTASSIUM SERPL-MCNC: 3.9 MMOL/L — SIGNIFICANT CHANGE UP (ref 3.5–5.3)
POTASSIUM SERPL-SCNC: 3.4 MMOL/L — LOW (ref 3.5–5.3)
POTASSIUM SERPL-SCNC: 3.9 MMOL/L — SIGNIFICANT CHANGE UP (ref 3.5–5.3)
RBC # BLD: 3.5 M/UL — LOW (ref 3.8–5.2)
RBC # FLD: 14.2 % — SIGNIFICANT CHANGE UP (ref 10.3–14.5)
SODIUM SERPL-SCNC: 137 MMOL/L — SIGNIFICANT CHANGE UP (ref 135–145)
SODIUM SERPL-SCNC: 137 MMOL/L — SIGNIFICANT CHANGE UP (ref 135–145)
TSH SERPL-MCNC: 5.73 UIU/ML — HIGH (ref 0.27–4.2)
WBC # BLD: 6.07 K/UL — SIGNIFICANT CHANGE UP (ref 3.8–10.5)
WBC # FLD AUTO: 6.07 K/UL — SIGNIFICANT CHANGE UP (ref 3.8–10.5)

## 2018-03-21 PROCEDURE — 99231 SBSQ HOSP IP/OBS SF/LOW 25: CPT

## 2018-03-21 PROCEDURE — 99233 SBSQ HOSP IP/OBS HIGH 50: CPT

## 2018-03-21 RX ORDER — TRAMADOL HYDROCHLORIDE 50 MG/1
25 TABLET ORAL
Qty: 0 | Refills: 0 | Status: DISCONTINUED | OUTPATIENT
Start: 2018-03-21 | End: 2018-03-22

## 2018-03-21 RX ORDER — SODIUM CHLORIDE 0.65 %
1 AEROSOL, SPRAY (ML) NASAL
Qty: 0 | Refills: 0 | COMMUNITY
Start: 2018-03-21

## 2018-03-21 RX ORDER — DOCUSATE SODIUM 100 MG
1 CAPSULE ORAL
Qty: 0 | Refills: 0 | COMMUNITY
Start: 2018-03-21

## 2018-03-21 RX ORDER — METOPROLOL TARTRATE 50 MG
25 TABLET ORAL ONCE
Qty: 0 | Refills: 0 | Status: COMPLETED | OUTPATIENT
Start: 2018-03-21 | End: 2018-03-21

## 2018-03-21 RX ORDER — POTASSIUM CHLORIDE 20 MEQ
20 PACKET (EA) ORAL ONCE
Qty: 0 | Refills: 0 | Status: COMPLETED | OUTPATIENT
Start: 2018-03-21 | End: 2018-03-21

## 2018-03-21 RX ORDER — TRAMADOL HYDROCHLORIDE 50 MG/1
0.5 TABLET ORAL
Qty: 0 | Refills: 0 | COMMUNITY
Start: 2018-03-21

## 2018-03-21 RX ORDER — POTASSIUM CHLORIDE 20 MEQ
40 PACKET (EA) ORAL ONCE
Qty: 0 | Refills: 0 | Status: COMPLETED | OUTPATIENT
Start: 2018-03-21 | End: 2018-03-21

## 2018-03-21 RX ORDER — LIDOCAINE 4 G/100G
1 CREAM TOPICAL
Qty: 0 | Refills: 0 | COMMUNITY
Start: 2018-03-21

## 2018-03-21 RX ORDER — SENNA PLUS 8.6 MG/1
2 TABLET ORAL
Qty: 0 | Refills: 0 | COMMUNITY
Start: 2018-03-21

## 2018-03-21 RX ORDER — ACETAMINOPHEN 500 MG
2 TABLET ORAL
Qty: 0 | Refills: 0 | COMMUNITY
Start: 2018-03-21

## 2018-03-21 RX ORDER — SODIUM CHLORIDE 0.65 %
1 AEROSOL, SPRAY (ML) NASAL EVERY 4 HOURS
Qty: 0 | Refills: 0 | Status: DISCONTINUED | OUTPATIENT
Start: 2018-03-21 | End: 2018-03-22

## 2018-03-21 RX ADMIN — PREGABALIN 100 MICROGRAM(S): 225 CAPSULE ORAL at 14:03

## 2018-03-21 RX ADMIN — ENOXAPARIN SODIUM 30 MILLIGRAM(S): 100 INJECTION SUBCUTANEOUS at 13:12

## 2018-03-21 RX ADMIN — Medication 100 MILLIGRAM(S): at 22:01

## 2018-03-21 RX ADMIN — Medication 25 MILLIGRAM(S): at 22:01

## 2018-03-21 RX ADMIN — Medication 40 MILLIEQUIVALENT(S): at 13:12

## 2018-03-21 RX ADMIN — Medication 650 MILLIGRAM(S): at 05:52

## 2018-03-21 RX ADMIN — Medication 1 TABLET(S): at 05:53

## 2018-03-21 RX ADMIN — FAMOTIDINE 20 MILLIGRAM(S): 10 INJECTION INTRAVENOUS at 13:12

## 2018-03-21 RX ADMIN — Medication 650 MILLIGRAM(S): at 14:51

## 2018-03-21 RX ADMIN — Medication 20 MILLIEQUIVALENT(S): at 22:01

## 2018-03-21 RX ADMIN — POLYETHYLENE GLYCOL 3350 17 GRAM(S): 17 POWDER, FOR SOLUTION ORAL at 13:12

## 2018-03-21 RX ADMIN — TRAMADOL HYDROCHLORIDE 25 MILLIGRAM(S): 50 TABLET ORAL at 22:20

## 2018-03-21 RX ADMIN — TRAMADOL HYDROCHLORIDE 25 MILLIGRAM(S): 50 TABLET ORAL at 17:53

## 2018-03-21 RX ADMIN — Medication 15 MILLIGRAM(S): at 04:45

## 2018-03-21 RX ADMIN — Medication 650 MILLIGRAM(S): at 13:12

## 2018-03-21 RX ADMIN — TRAMADOL HYDROCHLORIDE 25 MILLIGRAM(S): 50 TABLET ORAL at 22:40

## 2018-03-21 RX ADMIN — TRAMADOL HYDROCHLORIDE 25 MILLIGRAM(S): 50 TABLET ORAL at 19:00

## 2018-03-21 RX ADMIN — Medication 100 MILLIGRAM(S): at 05:52

## 2018-03-21 RX ADMIN — AMLODIPINE BESYLATE 2.5 MILLIGRAM(S): 2.5 TABLET ORAL at 05:53

## 2018-03-21 RX ADMIN — Medication 100 MILLIGRAM(S): at 13:12

## 2018-03-21 RX ADMIN — Medication 15 MILLIGRAM(S): at 09:37

## 2018-03-21 RX ADMIN — TRAMADOL HYDROCHLORIDE 25 MILLIGRAM(S): 50 TABLET ORAL at 04:51

## 2018-03-21 RX ADMIN — Medication 12.5 MICROGRAM(S): at 05:54

## 2018-03-21 RX ADMIN — Medication 2000 UNIT(S): at 17:48

## 2018-03-21 RX ADMIN — Medication 650 MILLIGRAM(S): at 17:48

## 2018-03-21 RX ADMIN — Medication 2000 UNIT(S): at 05:53

## 2018-03-21 RX ADMIN — Medication 15 MILLIGRAM(S): at 01:00

## 2018-03-21 RX ADMIN — Medication 0.4 MILLIGRAM(S): at 13:12

## 2018-03-21 NOTE — PROGRESS NOTE ADULT - PROBLEM SELECTOR PLAN 1
Pt found to have Type 2 dens fracture, L1 compression fracture, T4 transverse body fracture  Pain control with Tylenol 650mg q6, lidoderm patch, ultram 25mg q6 prn   If necessary can add ultram 50mg q6 prn for severe pain   Avoid nsaids and narcotics in elderly   Pt to remain in C- collar. Non-operative intervention for Type 2 dens fracture  Bowel regimen  PT recommending DAVID  Sx follow up.

## 2018-03-21 NOTE — PROGRESS NOTE ADULT - SUBJECTIVE AND OBJECTIVE BOX
Trauma Surgery Progress Note     Subjective/24hour Events: No acute events overnight. Pain controlled. Tolerating diet. Passing flatus and stool    Vital Signs:  Vital Signs Last 24 Hrs  T(C): 36.9 (21 Mar 2018 04:51), Max: 37 (20 Mar 2018 13:50)  T(F): 98.4 (21 Mar 2018 04:51), Max: 98.6 (20 Mar 2018 13:50)  HR: 96 (21 Mar 2018 04:51) (88 - 96)  BP: 106/76 (21 Mar 2018 04:51) (106/76 - 257/96)  BP(mean): --  RR: 18 (21 Mar 2018 04:51) (16 - 18)  SpO2: 97% (21 Mar 2018 04:51) (93% - 98%)    CAPILLARY BLOOD GLUCOSE          I&O's Detail    19 Mar 2018 07:01  -  20 Mar 2018 07:00  --------------------------------------------------------  IN:  Total IN: 0 mL    OUT:    Voided: 301 mL  Total OUT: 301 mL    Total NET: -301 mL      20 Mar 2018 07:01  -  21 Mar 2018 05:15  --------------------------------------------------------  IN:    Oral Fluid: 920 mL  Total IN: 920 mL    OUT:    Voided: 525 mL  Total OUT: 525 mL    Total NET: 395 mL            MEDICATIONS  (STANDING):  acetaminophen   Tablet. 650 milliGRAM(s) Oral every 6 hours  amLODIPine   Tablet 2.5 milliGRAM(s) Oral daily  cholecalciferol 2000 Unit(s) Oral two times a day  cyanocobalamin 100 MICROGram(s) Oral daily  docusate sodium 100 milliGRAM(s) Oral three times a day  enoxaparin Injectable 30 milliGRAM(s) SubCutaneous every 24 hours  famotidine    Tablet 20 milliGRAM(s) Oral daily  folic acid 0.4 milliGRAM(s) Oral daily  ketorolac   Injectable 15 milliGRAM(s) IV Push every 6 hours  levothyroxine 12.5 MICROGram(s) Oral daily  lidocaine   Patch 1 Patch Transdermal every 24 hours  polyethylene glycol 3350 17 Gram(s) Oral daily  potassium acid phosphate/sodium acid phosphate tablet (K-PHOS No. 2) 1 Tablet(s) Oral two times a day before meals  senna 2 Tablet(s) Oral at bedtime    MEDICATIONS  (PRN):  traMADol 25 milliGRAM(s) Oral every 6 hours PRN Moderate Pain (4 - 6)        Physical Exam:  Gen: NAD, collar, no focal deficits  Abd: NT/ND, soft      Labs:    03-20    136  |  99  |  20  ----------------------------<  122<H>  3.5   |  26  |  0.64    Ca    9.5      20 Mar 2018 07:49  Phos  2.4     03-20  Mg     1.8     03-20                              10.9   6.28  )-----------( 192      ( 20 Mar 2018 09:59 )             31.7             ASSESSMENT: 89y F s/p fall from standing, now with Type 2 dens fracture, T4 transverse body fracture, displaced sternal fracture with inspiration limited by pain, and L1 compression fracture.    - C- collar. Non-operative intervention for Type 2 dens fracture  - TLSO Brace for thoracic and lumbar spinal fractures  - Regular diet, bowel regimen  - VTE ppx w/ Lovenox  - dispo DAVID

## 2018-03-21 NOTE — PROGRESS NOTE ADULT - SUBJECTIVE AND OBJECTIVE BOX
Patient is a 89y old  Female who presents with a chief complaint of Transfer from [facility] for multiple fractures (20 Mar 2018 12:41)      SUBJECTIVE / OVERNIGHT EVENTS: Daughter at bedside, pt reports she is in pain especially on movement, tolerating diet, no abdominal pain, passing flatus and stool     MEDICATIONS  (STANDING):  acetaminophen   Tablet. 650 milliGRAM(s) Oral every 6 hours  amLODIPine   Tablet 2.5 milliGRAM(s) Oral daily  cholecalciferol 2000 Unit(s) Oral two times a day  cyanocobalamin 100 MICROGram(s) Oral daily  docusate sodium 100 milliGRAM(s) Oral three times a day  enoxaparin Injectable 30 milliGRAM(s) SubCutaneous every 24 hours  famotidine    Tablet 20 milliGRAM(s) Oral daily  folic acid 0.4 milliGRAM(s) Oral daily  levothyroxine 12.5 MICROGram(s) Oral daily  lidocaine   Patch 1 Patch Transdermal every 24 hours  polyethylene glycol 3350 17 Gram(s) Oral daily  potassium acid phosphate/sodium acid phosphate tablet (K-PHOS No. 2) 1 Tablet(s) Oral two times a day before meals  potassium chloride    Tablet ER 40 milliEquivalent(s) Oral once  senna 2 Tablet(s) Oral at bedtime    MEDICATIONS  (PRN):  sodium chloride 0.65% Nasal 1 Spray(s) Both Nostrils every 4 hours PRN Nasal Congestion  traMADol 25 milliGRAM(s) Oral every 6 hours PRN Moderate Pain (4 - 6)        CAPILLARY BLOOD GLUCOSE        I&O's Summary    20 Mar 2018 07:01  -  21 Mar 2018 07:00  --------------------------------------------------------  IN: 1420 mL / OUT: 525 mL / NET: 895 mL        PHYSICAL EXAM:  GENERAL: NAD, thin   HEAD:  Atraumatic, Normocephalic  EYES: EOMI, PERRLA, conjunctiva and sclera clear  NECK: C collar in place  CHEST/LUNG: decreased breath sounds b/l, No wheeze  HEART: Regular rate and rhythm; S1S2  ABDOMEN: Soft, Nontender, Nondistended; Bowel sounds present  EXTREMITIES:  2+ Peripheral Pulses, No clubbing, cyanosis, or edema  PSYCH: AAOx3  NEUROLOGY: non-focal  SKIN: No rashes or lesions    LABS:                        10.9   6.28  )-----------( 192      ( 20 Mar 2018 09:59 )             31.7     03-21    137  |  99  |  20  ----------------------------<  115<H>  3.4<L>   |  25  |  0.70    Ca    9.7      21 Mar 2018 07:03  Phos  2.4     03-20  Mg     1.8     03-20                RADIOLOGY & ADDITIONAL TESTS:    Imaging Personally Reviewed:    Consultant(s) Notes Reviewed:  sx    Care Discussed with Consultants/Other Providers: sx

## 2018-03-21 NOTE — PROGRESS NOTE ADULT - PROBLEM SELECTOR PLAN 5
Pt with fall likely in the setting of osteoarthritis  Continue vitamin d supplementation for secondary fall prevention   Pt on vitamin d 4000u/day  Check vitamin d level.

## 2018-03-21 NOTE — PROGRESS NOTE ADULT - PROBLEM SELECTOR PLAN 2
Pt found to have T4 transverse body fracture  TLSO Brace for thoracic and lumbar spinal fractures  Pain control as above  PT DAVID

## 2018-03-21 NOTE — PROGRESS NOTE ADULT - PROBLEM SELECTOR PLAN 7
Pt has multiple episodes of elevated pressures likely in the setting of pain/ trauma  Monitor pressures today on norvasc 2.5mg qd   If BP over 150- 155/90 through the day/ night, can increase to norvasc 5mg qd

## 2018-03-22 VITALS
DIASTOLIC BLOOD PRESSURE: 95 MMHG | RESPIRATION RATE: 17 BRPM | OXYGEN SATURATION: 94 % | SYSTOLIC BLOOD PRESSURE: 138 MMHG | HEART RATE: 88 BPM | TEMPERATURE: 98 F

## 2018-03-22 LAB
ANION GAP SERPL CALC-SCNC: 9 MMOL/L — SIGNIFICANT CHANGE UP (ref 5–17)
BUN SERPL-MCNC: 20 MG/DL — SIGNIFICANT CHANGE UP (ref 7–23)
CALCIUM SERPL-MCNC: 9.6 MG/DL — SIGNIFICANT CHANGE UP (ref 8.4–10.5)
CHLORIDE SERPL-SCNC: 100 MMOL/L — SIGNIFICANT CHANGE UP (ref 96–108)
CO2 SERPL-SCNC: 24 MMOL/L — SIGNIFICANT CHANGE UP (ref 22–31)
CREAT SERPL-MCNC: 0.65 MG/DL — SIGNIFICANT CHANGE UP (ref 0.5–1.3)
GLUCOSE SERPL-MCNC: 111 MG/DL — HIGH (ref 70–99)
HCT VFR BLD CALC: 30.9 % — LOW (ref 34.5–45)
HGB BLD-MCNC: 10.5 G/DL — LOW (ref 11.5–15.5)
MAGNESIUM SERPL-MCNC: 1.7 MG/DL — SIGNIFICANT CHANGE UP (ref 1.6–2.6)
MCHC RBC-ENTMCNC: 31.3 PG — SIGNIFICANT CHANGE UP (ref 27–34)
MCHC RBC-ENTMCNC: 34 GM/DL — SIGNIFICANT CHANGE UP (ref 32–36)
MCV RBC AUTO: 92.2 FL — SIGNIFICANT CHANGE UP (ref 80–100)
NRBC # BLD: 0 /100 WBCS — SIGNIFICANT CHANGE UP (ref 0–0)
PHOSPHATE SERPL-MCNC: 2.6 MG/DL — SIGNIFICANT CHANGE UP (ref 2.5–4.5)
PLATELET # BLD AUTO: 216 K/UL — SIGNIFICANT CHANGE UP (ref 150–400)
POTASSIUM SERPL-MCNC: 4 MMOL/L — SIGNIFICANT CHANGE UP (ref 3.5–5.3)
POTASSIUM SERPL-SCNC: 4 MMOL/L — SIGNIFICANT CHANGE UP (ref 3.5–5.3)
RBC # BLD: 3.35 M/UL — LOW (ref 3.8–5.2)
RBC # FLD: 14.2 % — SIGNIFICANT CHANGE UP (ref 10.3–14.5)
SODIUM SERPL-SCNC: 133 MMOL/L — LOW (ref 135–145)
WBC # BLD: 6.69 K/UL — SIGNIFICANT CHANGE UP (ref 3.8–10.5)
WBC # FLD AUTO: 6.69 K/UL — SIGNIFICANT CHANGE UP (ref 3.8–10.5)

## 2018-03-22 PROCEDURE — 97166 OT EVAL MOD COMPLEX 45 MIN: CPT

## 2018-03-22 PROCEDURE — 96374 THER/PROPH/DIAG INJ IV PUSH: CPT

## 2018-03-22 PROCEDURE — 99233 SBSQ HOSP IP/OBS HIGH 50: CPT

## 2018-03-22 PROCEDURE — 81001 URINALYSIS AUTO W/SCOPE: CPT

## 2018-03-22 PROCEDURE — 83735 ASSAY OF MAGNESIUM: CPT

## 2018-03-22 PROCEDURE — 80048 BASIC METABOLIC PNL TOTAL CA: CPT

## 2018-03-22 PROCEDURE — 84443 ASSAY THYROID STIM HORMONE: CPT

## 2018-03-22 PROCEDURE — 82306 VITAMIN D 25 HYDROXY: CPT

## 2018-03-22 PROCEDURE — 71045 X-RAY EXAM CHEST 1 VIEW: CPT

## 2018-03-22 PROCEDURE — 85027 COMPLETE CBC AUTOMATED: CPT

## 2018-03-22 PROCEDURE — 99285 EMERGENCY DEPT VISIT HI MDM: CPT | Mod: 25

## 2018-03-22 PROCEDURE — 82330 ASSAY OF CALCIUM: CPT

## 2018-03-22 PROCEDURE — 97163 PT EVAL HIGH COMPLEX 45 MIN: CPT

## 2018-03-22 PROCEDURE — 84100 ASSAY OF PHOSPHORUS: CPT

## 2018-03-22 RX ORDER — MAGNESIUM SULFATE 500 MG/ML
2 VIAL (ML) INJECTION ONCE
Qty: 0 | Refills: 0 | Status: COMPLETED | OUTPATIENT
Start: 2018-03-22 | End: 2018-03-22

## 2018-03-22 RX ORDER — ONDANSETRON 8 MG/1
4 TABLET, FILM COATED ORAL ONCE
Qty: 0 | Refills: 0 | Status: COMPLETED | OUTPATIENT
Start: 2018-03-22 | End: 2018-03-22

## 2018-03-22 RX ORDER — TRAMADOL HYDROCHLORIDE 50 MG/1
37.5 TABLET ORAL EVERY 4 HOURS
Qty: 0 | Refills: 0 | Status: DISCONTINUED | OUTPATIENT
Start: 2018-03-22 | End: 2018-03-22

## 2018-03-22 RX ORDER — HYDRALAZINE HCL 50 MG
25 TABLET ORAL ONCE
Qty: 0 | Refills: 0 | Status: COMPLETED | OUTPATIENT
Start: 2018-03-22 | End: 2018-03-22

## 2018-03-22 RX ORDER — TRAMADOL HYDROCHLORIDE 50 MG/1
25 TABLET ORAL
Qty: 0 | Refills: 0 | Status: DISCONTINUED | OUTPATIENT
Start: 2018-03-22 | End: 2018-03-22

## 2018-03-22 RX ORDER — AMLODIPINE BESYLATE 2.5 MG/1
1 TABLET ORAL
Qty: 0 | Refills: 0 | COMMUNITY
Start: 2018-03-22

## 2018-03-22 RX ORDER — ACETAMINOPHEN 500 MG
975 TABLET ORAL EVERY 12 HOURS
Qty: 0 | Refills: 0 | Status: DISCONTINUED | OUTPATIENT
Start: 2018-03-22 | End: 2018-03-22

## 2018-03-22 RX ADMIN — TRAMADOL HYDROCHLORIDE 25 MILLIGRAM(S): 50 TABLET ORAL at 03:16

## 2018-03-22 RX ADMIN — Medication 12.5 MICROGRAM(S): at 05:01

## 2018-03-22 RX ADMIN — FAMOTIDINE 20 MILLIGRAM(S): 10 INJECTION INTRAVENOUS at 12:47

## 2018-03-22 RX ADMIN — Medication 2000 UNIT(S): at 05:01

## 2018-03-22 RX ADMIN — TRAMADOL HYDROCHLORIDE 25 MILLIGRAM(S): 50 TABLET ORAL at 03:46

## 2018-03-22 RX ADMIN — Medication 50 GRAM(S): at 09:57

## 2018-03-22 RX ADMIN — TRAMADOL HYDROCHLORIDE 25 MILLIGRAM(S): 50 TABLET ORAL at 10:20

## 2018-03-22 RX ADMIN — Medication 100 MILLIGRAM(S): at 05:01

## 2018-03-22 RX ADMIN — ONDANSETRON 4 MILLIGRAM(S): 8 TABLET, FILM COATED ORAL at 12:55

## 2018-03-22 RX ADMIN — Medication 0.4 MILLIGRAM(S): at 12:46

## 2018-03-22 RX ADMIN — TRAMADOL HYDROCHLORIDE 25 MILLIGRAM(S): 50 TABLET ORAL at 09:24

## 2018-03-22 RX ADMIN — AMLODIPINE BESYLATE 2.5 MILLIGRAM(S): 2.5 TABLET ORAL at 05:01

## 2018-03-22 RX ADMIN — Medication 25 MILLIGRAM(S): at 01:01

## 2018-03-22 RX ADMIN — POLYETHYLENE GLYCOL 3350 17 GRAM(S): 17 POWDER, FOR SOLUTION ORAL at 12:46

## 2018-03-22 RX ADMIN — PREGABALIN 100 MICROGRAM(S): 225 CAPSULE ORAL at 12:47

## 2018-03-22 NOTE — PROGRESS NOTE ADULT - PROBLEM SELECTOR PLAN 1
Pt found to have Type 2 dens fracture, L1 compression fracture, T4 transverse body fracture  Increase Tylenol 975mg q12, max dose in this pt is 3gm/ day, lidoderm patch, ultram increased to 25mg q3 prn   D/w daughter, reports pt has adverse side effects from 50mg tramadol   D/w pharmacy, tramadol cannot be dosed at 37.5mg  Frequency increased to q3 hr  Avoid nsaids and narcotics in elderly   Pt to remain in C- collar. Non-operative intervention for Type 2 dens fracture  Bowel regimen  PT recommending DAVID  Sx follow up.

## 2018-03-22 NOTE — PROGRESS NOTE ADULT - SUBJECTIVE AND OBJECTIVE BOX
Patient is a 89y old  Female who presents with a chief complaint of Transfer from [facility] for multiple fractures (20 Mar 2018 12:41)      SUBJECTIVE / OVERNIGHT EVENTS: Daughter at bedside, reports pt still with generalized pain, had bm, no fevers or chills    MEDICATIONS  (STANDING):  acetaminophen   Tablet. 975 milliGRAM(s) Oral every 12 hours  amLODIPine   Tablet 2.5 milliGRAM(s) Oral daily  cholecalciferol 2000 Unit(s) Oral two times a day  cyanocobalamin 100 MICROGram(s) Oral daily  docusate sodium 100 milliGRAM(s) Oral three times a day  enoxaparin Injectable 30 milliGRAM(s) SubCutaneous every 24 hours  famotidine    Tablet 20 milliGRAM(s) Oral daily  folic acid 0.4 milliGRAM(s) Oral daily  levothyroxine 12.5 MICROGram(s) Oral daily  lidocaine   Patch 1 Patch Transdermal every 24 hours  ondansetron Injectable 4 milliGRAM(s) IV Push once  polyethylene glycol 3350 17 Gram(s) Oral daily  senna 2 Tablet(s) Oral at bedtime    MEDICATIONS  (PRN):  sodium chloride 0.65% Nasal 1 Spray(s) Both Nostrils every 4 hours PRN Nasal Congestion  traMADol 25 milliGRAM(s) Oral every 3 hours PRN Moderate Pain (4 - 6)        CAPILLARY BLOOD GLUCOSE        I&O's Summary    21 Mar 2018 07:01  -  22 Mar 2018 07:00  --------------------------------------------------------  IN: 1280 mL / OUT: 400 mL / NET: 880 mL    22 Mar 2018 07:01  -  22 Mar 2018 12:20  --------------------------------------------------------  IN: 340 mL / OUT: 150 mL / NET: 190 mL        PHYSICAL EXAM:  GENERAL: NAD, frail   HEAD:  Atraumatic, Normocephalic  EYES: EOMI, PERRLA, conjunctiva and sclera clear  NECK: C collar  CHEST/LUNG: Clear to auscultation bilaterally; No wheeze  HEART: Regular rate and rhythm;S1S2  ABDOMEN: Soft, Nontender, Nondistended; Bowel sounds present  EXTREMITIES:  2+ Peripheral Pulses, No clubbing, cyanosis, or edema  PSYCH: AAOx3  NEUROLOGY: non-focal  SKIN: No rashes or lesions    LABS:                        10.5   6.69  )-----------( 216      ( 22 Mar 2018 07:37 )             30.9     03-22    133<L>  |  100  |  20  ----------------------------<  111<H>  4.0   |  24  |  0.65    Ca    9.6      22 Mar 2018 07:00  Phos  2.6     03-22  Mg     1.7     03-22                RADIOLOGY & ADDITIONAL TESTS:    Imaging Personally Reviewed:    Consultant(s) Notes Reviewed:  sx    Care Discussed with Consultants/Other Providers: sx

## 2018-03-22 NOTE — PROGRESS NOTE ADULT - PROBLEM SELECTOR PLAN 3
Synthroid 12.5mcg  TSH 5.73, mildly elevated in the setting of acute trauma, continue synthroid at the same dose

## 2018-03-22 NOTE — PROGRESS NOTE ADULT - ATTENDING COMMENTS
Pt seen and examined.  Chart reviewed.  Resident note confirmed.  The pt is a 89 year old female with multiple medical problems who presents to [facility] s/p fall from standing.  Work up revealed a type 2 dens fracture, T4 transverse body fracture, displaced sternal fracture with inspiration limited by pain, and L1 compression fracture.  No acute events overnight.  Pain is better controlled      PMH/PSH/MEDS/ALL/SH/FH/ROS:  Unchanged from H&P above  Vitals/PE/Labs/Radiographic data:  Reviewed with resident    A/p  Continue pain control  Aspen collar for mgmt of C2 and T4 fx  continue ISP  Continue regular diet  Continue bowel regimen  CKD 2, continue to monitor  Continue VTE ppx w/ Lovenox  PT/OT evaluation  Pt will likely require DAVID
Pt seen and examined.  Chart reviewed.  Resident note confirmed.  The pt is a 89 year old female with multiple medical problems who presents to [facility] s/p fall from standing.  Work up revealed a type 2 dens fracture, T4 transverse body fracture, displaced sternal fracture with inspiration limited by pain, and L1 compression fracture.  No acute events overnight.  Pain is better controlled      PMH/PSH/MEDS/ALL/SH/FH/ROS:  Unchanged from H&P above  Vitals/PE/Labs/Radiographic data:  Reviewed with resident    A/p  Continue pain control  Aspen collar for mgmt of C2 and T4 fx  Continue ISP  Continue regular diet  Continue bowel regimen  CKD 2, continue to monitor  Continue VTE ppx w/ Lovenox  PT/OT evaluation  Pt will likely require DAVID.
Pt seen and examined.  Chart reviewed.  Resident note confirmed.  The pt is a 89 year old female with multiple medical problems who presents to [facility] s/p fall from standing.  Work up revealed a type 2 dens fracture, T4 transverse body fracture, displaced sternal fracture with inspiration limited by pain, and L1 compression fracture.  No acute events overnight.  Pain is better controlled      PMH/PSH/MEDS/ALL/SH/FH/ROS:  Unchanged from H&P above  Vitals/PE/Labs/Radiographic data:  Reviewed with resident    A/p  Continue pain control  Aspen collar for mgmt of C2 and T4 fx  Continue ISP  Continue regular diet  Continue bowel regimen  CKD 2, continue to monitor  Monitor and replace lytes  Continue VTE ppx w/ Lovenox  PT/OT evaluation  Pt will require DAVID.
I examined this patient on AM SICU rounds with the multidisciplinary team.  All relevant studies reviewed.  Agree with the data expressed  above.  Elderly woman s/p minor fall with several traumatic issues:    Osteoporosis:  likely the predisposing factor to all of the following.  Debility:  89 year old with age-related debility and muscular deconditioning.  Dens fracture:  in "immobilization" collar which is the plan of treatment.  T4 fracture and LI compression fracture - ? age.  TLSO brace ordered for comfort.  Displaced sternal fracture but no dysrhythmia noted.      Despite the numerous bony injuries noted - particularly Dens fracture, the patient is non focal and responsive.  She needs early rehabilitation and may be d/c'd to the floor.
D/w daughter and  pending discharge to [facility]
D/w daughter at bedside updated on full plan of care

## 2018-03-22 NOTE — PROGRESS NOTE ADULT - PROBLEM SELECTOR PLAN 7
Pt has multiple episodes of elevated pressures likely in the setting of pain/ trauma  Monitor pressures today on norvasc 2.5mg qd   Can discharge pt on norvasc 2.5mg qd

## 2018-03-22 NOTE — PROGRESS NOTE ADULT - SUBJECTIVE AND OBJECTIVE BOX
Trauma Surgery Progress Note     Subjective/24hour Events: No acute events overnight. Pain controlled. Tolerating Diet.     Vital Signs:  Vital Signs Last 24 Hrs  T(C): 36.6 (22 Mar 2018 04:55), Max: 36.9 (22 Mar 2018 00:55)  T(F): 97.9 (22 Mar 2018 04:55), Max: 98.4 (22 Mar 2018 00:55)  HR: 78 (22 Mar 2018 04:55) (78 - 97)  BP: 149/92 (22 Mar 2018 04:55) (144/87 - 172/96)  BP(mean): --  RR: 18 (22 Mar 2018 04:55) (17 - 18)  SpO2: 97% (22 Mar 2018 04:55) (94% - 98%)    CAPILLARY BLOOD GLUCOSE          I&O's Detail    20 Mar 2018 07:01  -  21 Mar 2018 07:00  --------------------------------------------------------  IN:    Oral Fluid: 1420 mL  Total IN: 1420 mL    OUT:    Voided: 525 mL  Total OUT: 525 mL    Total NET: 895 mL      21 Mar 2018 07:01  -  22 Mar 2018 05:30  --------------------------------------------------------  IN:    Oral Fluid: 1280 mL  Total IN: 1280 mL    OUT:    Voided: 400 mL  Total OUT: 400 mL    Total NET: 880 mL            MEDICATIONS  (STANDING):  amLODIPine   Tablet 2.5 milliGRAM(s) Oral daily  cholecalciferol 2000 Unit(s) Oral two times a day  cyanocobalamin 100 MICROGram(s) Oral daily  docusate sodium 100 milliGRAM(s) Oral three times a day  enoxaparin Injectable 30 milliGRAM(s) SubCutaneous every 24 hours  famotidine    Tablet 20 milliGRAM(s) Oral daily  folic acid 0.4 milliGRAM(s) Oral daily  levothyroxine 12.5 MICROGram(s) Oral daily  lidocaine   Patch 1 Patch Transdermal every 24 hours  polyethylene glycol 3350 17 Gram(s) Oral daily  senna 2 Tablet(s) Oral at bedtime    MEDICATIONS  (PRN):  sodium chloride 0.65% Nasal 1 Spray(s) Both Nostrils every 4 hours PRN Nasal Congestion  traMADol 25 milliGRAM(s) Oral every 3 hours PRN Moderate Pain (4 - 6)        Physical Exam:  Gen: NAD, collar, no focal deficits  Abd: NT/ND, soft      Labs:    03-21    137  |  100  |  19  ----------------------------<  145<H>  3.9   |  22  |  0.69    Ca    9.3      21 Mar 2018 18:55  Phos  2.4     03-20  Mg     1.8     03-20                              11.3   6.07  )-----------( 210      ( 21 Mar 2018 08:39 )             32.2           ASSESSMENT: 89y F s/p fall from standing, now with Type 2 dens fracture, T4 transverse body fracture, displaced sternal fracture with inspiration limited by pain, and L1 compression fracture.    - Aspen collar. Non-operative intervention for Type 2 dens fracture  - TLSO Brace for thoracic and lumbar spinal fractures  - Regular diet, bowel regimen  - VTE ppx w/ Lovenox  - dispo DAVID

## 2018-03-22 NOTE — PROGRESS NOTE ADULT - PROBLEM SELECTOR PLAN 5
Pt with fall likely in the setting of osteoarthritis  Continue vitamin d supplementation for secondary fall prevention   Pt on vitamin d 4000u/day  Vitamin d level 74

## 2018-04-23 ENCOUNTER — APPOINTMENT (OUTPATIENT)
Dept: TRAUMA SURGERY | Facility: CLINIC | Age: 83
End: 2018-04-23
Payer: MEDICARE

## 2018-04-23 VITALS
DIASTOLIC BLOOD PRESSURE: 89 MMHG | SYSTOLIC BLOOD PRESSURE: 151 MMHG | HEART RATE: 83 BPM | WEIGHT: 101 LBS | TEMPERATURE: 98.2 F | BODY MASS INDEX: 20.36 KG/M2 | HEIGHT: 59 IN

## 2018-04-23 DIAGNOSIS — Z09 ENCOUNTER FOR FOLLOW-UP EXAMINATION AFTER COMPLETED TREATMENT FOR CONDITIONS OTHER THAN MALIGNANT NEOPLASM: ICD-10-CM

## 2018-04-23 PROCEDURE — 99214 OFFICE O/P EST MOD 30 MIN: CPT

## 2019-02-01 ENCOUNTER — EMERGENCY (EMERGENCY)
Facility: HOSPITAL | Age: 84
LOS: 1 days | Discharge: HOME | End: 2019-02-01
Attending: INTERNAL MEDICINE | Admitting: EMERGENCY MEDICINE

## 2019-02-01 VITALS
SYSTOLIC BLOOD PRESSURE: 160 MMHG | DIASTOLIC BLOOD PRESSURE: 100 MMHG | RESPIRATION RATE: 16 BRPM | WEIGHT: 100.09 LBS | OXYGEN SATURATION: 98 % | HEART RATE: 80 BPM

## 2019-02-01 DIAGNOSIS — Z90.49 ACQUIRED ABSENCE OF OTHER SPECIFIED PARTS OF DIGESTIVE TRACT: Chronic | ICD-10-CM

## 2019-02-01 PROCEDURE — 99283 EMERGENCY DEPT VISIT LOW MDM: CPT | Mod: 25

## 2019-02-01 PROCEDURE — 30901 CONTROL OF NOSEBLEED: CPT

## 2019-02-01 NOTE — ED ADULT NURSE NOTE - NSIMPLEMENTINTERV_GEN_ALL_ED
Implemented All Universal Safety Interventions:  [place] to call system. Call bell, personal items and telephone within reach. Instruct patient to call for assistance. Room bathroom lighting operational. Non-slip footwear when patient is off stretcher. Physically safe environment: no spills, clutter or unnecessary equipment. Stretcher in lowest position, wheels locked, appropriate side rails in place.

## 2019-02-02 ENCOUNTER — INPATIENT (INPATIENT)
Facility: HOSPITAL | Age: 84
LOS: 0 days | Discharge: HOME | DRG: 151 | End: 2019-02-03
Attending: FAMILY MEDICINE | Admitting: FAMILY MEDICINE
Payer: COMMERCIAL

## 2019-02-02 VITALS
OXYGEN SATURATION: 94 % | HEIGHT: 59 IN | DIASTOLIC BLOOD PRESSURE: 87 MMHG | TEMPERATURE: 97 F | WEIGHT: 100.09 LBS | RESPIRATION RATE: 18 BRPM | SYSTOLIC BLOOD PRESSURE: 131 MMHG | HEART RATE: 96 BPM

## 2019-02-02 VITALS
SYSTOLIC BLOOD PRESSURE: 132 MMHG | DIASTOLIC BLOOD PRESSURE: 67 MMHG | OXYGEN SATURATION: 96 % | TEMPERATURE: 98 F | RESPIRATION RATE: 17 BRPM | HEART RATE: 78 BPM

## 2019-02-02 DIAGNOSIS — R04.0 EPISTAXIS: ICD-10-CM

## 2019-02-02 DIAGNOSIS — Z90.49 ACQUIRED ABSENCE OF OTHER SPECIFIED PARTS OF DIGESTIVE TRACT: Chronic | ICD-10-CM

## 2019-02-02 PROBLEM — K57.92 DIVERTICULITIS OF INTESTINE, PART UNSPECIFIED, WITHOUT PERFORATION OR ABSCESS WITHOUT BLEEDING: Chronic | Status: ACTIVE | Noted: 2017-09-19

## 2019-02-02 LAB
ALBUMIN SERPL ELPH-MCNC: 3.7 G/DL — SIGNIFICANT CHANGE UP (ref 3.3–5)
ALP SERPL-CCNC: 57 U/L — SIGNIFICANT CHANGE UP (ref 40–120)
ALT FLD-CCNC: 21 U/L — SIGNIFICANT CHANGE UP (ref 12–78)
ANION GAP SERPL CALC-SCNC: 7 MMOL/L — SIGNIFICANT CHANGE UP (ref 5–17)
AST SERPL-CCNC: 17 U/L — SIGNIFICANT CHANGE UP (ref 15–37)
BILIRUB SERPL-MCNC: 0.2 MG/DL — SIGNIFICANT CHANGE UP (ref 0.2–1.2)
BLD GP AB SCN SERPL QL: SIGNIFICANT CHANGE UP
BUN SERPL-MCNC: 21 MG/DL — SIGNIFICANT CHANGE UP (ref 7–23)
CALCIUM SERPL-MCNC: 9.2 MG/DL — SIGNIFICANT CHANGE UP (ref 8.5–10.1)
CHLORIDE SERPL-SCNC: 108 MMOL/L — SIGNIFICANT CHANGE UP (ref 96–108)
CO2 SERPL-SCNC: 27 MMOL/L — SIGNIFICANT CHANGE UP (ref 22–31)
CREAT SERPL-MCNC: 0.76 MG/DL — SIGNIFICANT CHANGE UP (ref 0.5–1.3)
GLUCOSE SERPL-MCNC: 97 MG/DL — SIGNIFICANT CHANGE UP (ref 70–99)
HCT VFR BLD CALC: 35.3 % — SIGNIFICANT CHANGE UP (ref 34.5–45)
HGB BLD-MCNC: 11.7 G/DL — SIGNIFICANT CHANGE UP (ref 11.5–15.5)
INR BLD: 1.04 RATIO — SIGNIFICANT CHANGE UP (ref 0.88–1.16)
MCHC RBC-ENTMCNC: 30.9 PG — SIGNIFICANT CHANGE UP (ref 27–34)
MCHC RBC-ENTMCNC: 33.1 GM/DL — SIGNIFICANT CHANGE UP (ref 32–36)
MCV RBC AUTO: 93.1 FL — SIGNIFICANT CHANGE UP (ref 80–100)
NRBC # BLD: 0 /100 WBCS — SIGNIFICANT CHANGE UP (ref 0–0)
PLATELET # BLD AUTO: 209 K/UL — SIGNIFICANT CHANGE UP (ref 150–400)
POTASSIUM SERPL-MCNC: 4.5 MMOL/L — SIGNIFICANT CHANGE UP (ref 3.5–5.3)
POTASSIUM SERPL-SCNC: 4.5 MMOL/L — SIGNIFICANT CHANGE UP (ref 3.5–5.3)
PROT SERPL-MCNC: 6.7 G/DL — SIGNIFICANT CHANGE UP (ref 6–8.3)
PROTHROM AB SERPL-ACNC: 11.9 SEC — SIGNIFICANT CHANGE UP (ref 10–12.9)
RBC # BLD: 3.79 M/UL — LOW (ref 3.8–5.2)
RBC # FLD: 14.1 % — SIGNIFICANT CHANGE UP (ref 10.3–14.5)
SODIUM SERPL-SCNC: 142 MMOL/L — SIGNIFICANT CHANGE UP (ref 135–145)
WBC # BLD: 6.74 K/UL — SIGNIFICANT CHANGE UP (ref 3.8–10.5)
WBC # FLD AUTO: 6.74 K/UL — SIGNIFICANT CHANGE UP (ref 3.8–10.5)

## 2019-02-02 RX ORDER — LACTOBACILLUS ACIDOPHILUS 100MM CELL
1 CAPSULE ORAL ONCE
Qty: 0 | Refills: 0 | Status: COMPLETED | OUTPATIENT
Start: 2019-02-02 | End: 2019-02-02

## 2019-02-02 RX ORDER — LEVOTHYROXINE SODIUM 125 MCG
12.5 TABLET ORAL
Qty: 0 | Refills: 0 | COMMUNITY

## 2019-02-02 RX ORDER — MULTIVIT-MIN/FERROUS GLUCONATE 9 MG/15 ML
1 LIQUID (ML) ORAL
Qty: 0 | Refills: 0 | COMMUNITY

## 2019-02-02 RX ORDER — ALUMINUM ZIRCONIUM TRICHLOROHYDREX GLY 0.2 G/G
1 STICK TOPICAL
Qty: 28 | Refills: 0 | OUTPATIENT
Start: 2019-02-02 | End: 2019-03-01

## 2019-02-02 RX ORDER — CHOLECALCIFEROL (VITAMIN D3) 125 MCG
1 CAPSULE ORAL
Qty: 0 | Refills: 0 | COMMUNITY

## 2019-02-02 RX ORDER — RANITIDINE HYDROCHLORIDE 150 MG/1
1 TABLET, FILM COATED ORAL
Qty: 0 | Refills: 0 | COMMUNITY

## 2019-02-02 RX ORDER — AMOXICILLIN 250 MG/5ML
875 SUSPENSION, RECONSTITUTED, ORAL (ML) ORAL
Qty: 0 | Refills: 0 | Status: DISCONTINUED | OUTPATIENT
Start: 2019-02-02 | End: 2019-02-03

## 2019-02-02 RX ORDER — TRAMADOL HYDROCHLORIDE 50 MG/1
37.5 TABLET ORAL
Qty: 0 | Refills: 0 | COMMUNITY

## 2019-02-02 RX ORDER — AMOXICILLIN 250 MG/5ML
1 SUSPENSION, RECONSTITUTED, ORAL (ML) ORAL
Qty: 14 | Refills: 0 | OUTPATIENT
Start: 2019-02-02 | End: 2019-02-08

## 2019-02-02 RX ORDER — POLYETHYLENE GLYCOL 3350 17 G/17G
1 POWDER, FOR SOLUTION ORAL
Qty: 0 | Refills: 0 | COMMUNITY

## 2019-02-02 NOTE — ED PROVIDER NOTE - CARE PROVIDER_API CALL
Roger Ross)  Otolaryngology  [address], [address]  [place] [address]  Phone: [phone]  Fax: [phone]

## 2019-02-02 NOTE — ED ADULT NURSE NOTE - OBJECTIVE STATEMENT
family reports pt was here overnight for epistaxis.  was d/c'd w/ packing in place.  pt was seen earlier by ENT and her PMD and was cleared.  family reports pt started bleeding afterwards.  upon arrival to ED, pt has packing in place no active bleeding noted.

## 2019-02-02 NOTE — ED PROVIDER NOTE - NSFOLLOWUPINSTRUCTIONS_ED_ALL_ED_FT
1) Follow-up with your Primary Medical Doctor or referred doctor. Call today / next business day for prompt follow-up.  2) Return to Emergency room for any worsening or persistent pain, weakness, fever, or any other concerning symptoms.  3) See attached instruction sheets for additional information, including information regarding signs and symptoms to look out for, reasons to seek immediate care and other important instructions.  4) Follow-up with any specialists as discussed / noted as well.

## 2019-02-02 NOTE — ED ADULT TRIAGE NOTE - CHIEF COMPLAINT QUOTE
Patient was discharged this morning with the nasal packing, started to bleed again shortly after. Nasal packing still in place.

## 2019-02-02 NOTE — ED PROVIDER NOTE - OBJECTIVE STATEMENT
89 y/o WF Bronchitis   Diverticulitis  GERD Hypothyroid Anemia  Pt with epistaxis for about 45 minutes 89 y/o WF Bronchitis   Diverticulitis  GERD Hypothyroid Anemia  Pt with epistaxis for about 45 minutes. She takes aspirin, no CO, no SOB no fever, no chills, no GIB no acute stroke symptoms.

## 2019-02-02 NOTE — ED PROVIDER NOTE - ENMT, MLM
Airway patent, Nasal mucosa clear. Mouth with normal mucosa. Throat has no vesicles, no oropharyngeal exudates and uvula is midline. right nares controled with pressure, not able to visualize the bleed

## 2019-02-02 NOTE — CONSULT NOTE ADULT - SUBJECTIVE AND OBJECTIVE BOX
89 year old female with right side epistaxis. She had a nose bleed that began last night and she went to the ER at [facility]. She had a rapid rhino placed and the bleeding stopped. She had some oozing but stopped this AM.   Patient denies any pain or discomfort.        PAST MEDICAL/SURGICAL/FAMILY/SOCIAL HISTORY:    Past Medical History:  Anemia    Bronchitis    Constipation    Diverticulitis    Diverticulitis    Fall    GERD (gastroesophageal reflux disease)    Hypothyroid    Osteoporosis    Pelvic fracture    Reflux.     Past Surgical History:  Hip fracture requiring operative repair  right  Ovarian cyst  right  S/P appendectomy    S/P laparoscopic cholecystectomy    S/P small bowel resection.     Tobacco Usage:  · Tobacco Usage	Unknown if ever smoked 	    ALLERGIES AND HOME MEDICATIONS:   Allergies:        Allergies:  	No Known Allergies:     Home Medications:   * Incomplete Medication History as of 01-Feb-2019 23:46 documented in Structured Notes  · 	amLODIPine 2.5 mg oral tablet: Last Dose Taken:  , 1 tab(s) orally once a day  · 	sodium chloride 0.65% nasal spray: Last Dose Taken:  , 1 spray(s) nasal 4 times a day, As Needed  · 	senna oral tablet: Last Dose Taken:  , 2 tab(s) orally once a day (at bedtime)  · 	docusate sodium 100 mg oral capsule: Last Dose Taken:  , 1 cap(s) orally 3 times a day  · 	lidocaine 5% topical film: Last Dose Taken:  , Apply topically to affected area once a day PRN pain  · 	acetaminophen 325 mg oral tablet: Last Dose Taken:  , 2 tab(s) orally every 6 hours  · 	Centrum oral tablet: Last Dose Taken:  , 1 tab(s) orally once a day  · 	traMADol 50 mg oral tablet: Last Dose Taken:  , 37.5 milligram(s) orally every 4 hours, As Needed   · 	Ultracet 37.5 mg-325 mg oral tablet: 1 tab(s) orally every 4 hours  · 	raNITIdine 150 mg oral capsule: 1 cap(s) orally once a day (at bedtime)  · 	levothyroxine: Last Dose Taken:  , 12.5 microgram(s) orally once a day  · 	Systane Balance ophthalmic solution: Last Dose Taken:  , 2 drop(s) to each affected eye 2 times a day  · 	Zantac 300 oral tablet: Last Dose Taken:  , 1 tab(s) orally once a day (at bedtime)  · 	MiraLax oral powder for reconstitution: Last Dose Taken:  , 1 cap(s) orally every other day  · 	PreserVision AREDS 2 oral capsule: Last Dose Taken:  , 1 cap(s) orally 2 times a day  · 	Probiotic: Last Dose Taken:  , 1 tab(s) orally once a day  · 	Vitamin D3 2000 intl units oral tablet: Last Dose Taken:  , 1 tab(s) orally 2 times a day  · 	Calcium 600+D: Last Dose Taken:  , 1 tab(s) orally 2 times a day  · 	Aspirin Enteric Coated 81 mg oral delayed release tablet: Last Dose Taken:  , 1 tab(s) orally once a day  · 	folic acid 0.4 mg oral tablet: Last Dose Taken:  , 1 tab(s) orally once a day  · 	Vitamin B Complex 100: Last Dose Taken:  , 1 tab(s) orally once a day  · 	Slow Fe (as elemental iron) 45 mg oral tablet, extended release: Last Dose Taken:  , 1 tab(s) orally once a day  · 	Vitamin B12: Last Dose Taken:  , 1 tab(s) orally once a day    REVIEW OF SYSTEMS:   No pertient positive        PE: Alert female in NAD  TM's intact  ME spaces well aerated  DNS, Right Balloon in nares-no bleeding  No OC/CP bleeding.           Impression: Right Epistaxis  Plan: D/C with oral Abx, F/u with her ENT in 48 hours for evaluation.

## 2019-02-02 NOTE — ED PROVIDER NOTE - PROGRESS NOTE DETAILS
case dw [facility]co, recommended Afrin to left nostril and increase air in bulb. spoke brielle Mao, removed Rapid Rhino and insert Surgicel soakded c Afrin. pt and family wanted ENT evaluation.  Left message on [facility] to come in. Spoke with Dr. Sandra who has no privilege at [place], who will try to contact Mayco As per [facility]co, admit and will see in am RENAY preciado MD and notified about the admission

## 2019-02-03 ENCOUNTER — TRANSCRIPTION ENCOUNTER (OUTPATIENT)
Age: 84
End: 2019-02-03

## 2019-02-03 VITALS
HEART RATE: 65 BPM | DIASTOLIC BLOOD PRESSURE: 86 MMHG | TEMPERATURE: 98 F | OXYGEN SATURATION: 95 % | SYSTOLIC BLOOD PRESSURE: 124 MMHG | RESPIRATION RATE: 18 BRPM

## 2019-02-03 DIAGNOSIS — R04.0 EPISTAXIS: ICD-10-CM

## 2019-02-03 DIAGNOSIS — D50.0 IRON DEFICIENCY ANEMIA SECONDARY TO BLOOD LOSS (CHRONIC): ICD-10-CM

## 2019-02-03 DIAGNOSIS — E03.9 HYPOTHYROIDISM, UNSPECIFIED: ICD-10-CM

## 2019-02-03 LAB
ALBUMIN SERPL ELPH-MCNC: 3.6 G/DL — SIGNIFICANT CHANGE UP (ref 3.3–5)
ALP SERPL-CCNC: 42 U/L — SIGNIFICANT CHANGE UP (ref 40–120)
ALT FLD-CCNC: 24 U/L — SIGNIFICANT CHANGE UP (ref 12–78)
ANION GAP SERPL CALC-SCNC: 6 MMOL/L — SIGNIFICANT CHANGE UP (ref 5–17)
AST SERPL-CCNC: 15 U/L — SIGNIFICANT CHANGE UP (ref 15–37)
BASOPHILS # BLD AUTO: 0.02 K/UL — SIGNIFICANT CHANGE UP (ref 0–0.2)
BASOPHILS NFR BLD AUTO: 0.2 % — SIGNIFICANT CHANGE UP (ref 0–2)
BILIRUB SERPL-MCNC: 0.5 MG/DL — SIGNIFICANT CHANGE UP (ref 0.2–1.2)
BUN SERPL-MCNC: 30 MG/DL — HIGH (ref 7–23)
CALCIUM SERPL-MCNC: 9.8 MG/DL — SIGNIFICANT CHANGE UP (ref 8.5–10.1)
CHLORIDE SERPL-SCNC: 107 MMOL/L — SIGNIFICANT CHANGE UP (ref 96–108)
CO2 SERPL-SCNC: 27 MMOL/L — SIGNIFICANT CHANGE UP (ref 22–31)
CREAT SERPL-MCNC: 0.63 MG/DL — SIGNIFICANT CHANGE UP (ref 0.5–1.3)
EOSINOPHIL # BLD AUTO: 0.03 K/UL — SIGNIFICANT CHANGE UP (ref 0–0.5)
EOSINOPHIL NFR BLD AUTO: 0.3 % — SIGNIFICANT CHANGE UP (ref 0–6)
GLUCOSE SERPL-MCNC: 124 MG/DL — HIGH (ref 70–99)
HCT VFR BLD CALC: 33.4 % — LOW (ref 34.5–45)
HGB BLD-MCNC: 11.2 G/DL — LOW (ref 11.5–15.5)
IMM GRANULOCYTES NFR BLD AUTO: 0.6 % — SIGNIFICANT CHANGE UP (ref 0–1.5)
LYMPHOCYTES # BLD AUTO: 1.36 K/UL — SIGNIFICANT CHANGE UP (ref 1–3.3)
LYMPHOCYTES # BLD AUTO: 14.5 % — SIGNIFICANT CHANGE UP (ref 13–44)
MCHC RBC-ENTMCNC: 30.9 PG — SIGNIFICANT CHANGE UP (ref 27–34)
MCHC RBC-ENTMCNC: 33.5 GM/DL — SIGNIFICANT CHANGE UP (ref 32–36)
MCV RBC AUTO: 92 FL — SIGNIFICANT CHANGE UP (ref 80–100)
MONOCYTES # BLD AUTO: 0.78 K/UL — SIGNIFICANT CHANGE UP (ref 0–0.9)
MONOCYTES NFR BLD AUTO: 8.3 % — SIGNIFICANT CHANGE UP (ref 2–14)
NEUTROPHILS # BLD AUTO: 7.11 K/UL — SIGNIFICANT CHANGE UP (ref 1.8–7.4)
NEUTROPHILS NFR BLD AUTO: 76.1 % — SIGNIFICANT CHANGE UP (ref 43–77)
NRBC # BLD: 0 /100 WBCS — SIGNIFICANT CHANGE UP (ref 0–0)
PLATELET # BLD AUTO: 196 K/UL — SIGNIFICANT CHANGE UP (ref 150–400)
POTASSIUM SERPL-MCNC: 4.5 MMOL/L — SIGNIFICANT CHANGE UP (ref 3.5–5.3)
POTASSIUM SERPL-SCNC: 4.5 MMOL/L — SIGNIFICANT CHANGE UP (ref 3.5–5.3)
PROT SERPL-MCNC: 6.8 G/DL — SIGNIFICANT CHANGE UP (ref 6–8.3)
RBC # BLD: 3.63 M/UL — LOW (ref 3.8–5.2)
RBC # FLD: 14.1 % — SIGNIFICANT CHANGE UP (ref 10.3–14.5)
SODIUM SERPL-SCNC: 140 MMOL/L — SIGNIFICANT CHANGE UP (ref 135–145)
WBC # BLD: 9.36 K/UL — SIGNIFICANT CHANGE UP (ref 3.8–10.5)
WBC # FLD AUTO: 9.36 K/UL — SIGNIFICANT CHANGE UP (ref 3.8–10.5)

## 2019-02-03 PROCEDURE — 85610 PROTHROMBIN TIME: CPT

## 2019-02-03 PROCEDURE — 99283 EMERGENCY DEPT VISIT LOW MDM: CPT

## 2019-02-03 PROCEDURE — 86901 BLOOD TYPING SEROLOGIC RH(D): CPT

## 2019-02-03 PROCEDURE — 93005 ELECTROCARDIOGRAM TRACING: CPT

## 2019-02-03 PROCEDURE — 80053 COMPREHEN METABOLIC PANEL: CPT

## 2019-02-03 PROCEDURE — 71046 X-RAY EXAM CHEST 2 VIEWS: CPT | Mod: 26

## 2019-02-03 PROCEDURE — 71046 X-RAY EXAM CHEST 2 VIEWS: CPT

## 2019-02-03 PROCEDURE — 36415 COLL VENOUS BLD VENIPUNCTURE: CPT

## 2019-02-03 PROCEDURE — 99283 EMERGENCY DEPT VISIT LOW MDM: CPT | Mod: 25

## 2019-02-03 PROCEDURE — 93010 ELECTROCARDIOGRAM REPORT: CPT

## 2019-02-03 PROCEDURE — 85027 COMPLETE CBC AUTOMATED: CPT

## 2019-02-03 PROCEDURE — 86850 RBC ANTIBODY SCREEN: CPT

## 2019-02-03 PROCEDURE — 30901 CONTROL OF NOSEBLEED: CPT | Mod: RT

## 2019-02-03 PROCEDURE — 99285 EMERGENCY DEPT VISIT HI MDM: CPT | Mod: 25

## 2019-02-03 PROCEDURE — 30903 CONTROL OF NOSEBLEED: CPT

## 2019-02-03 PROCEDURE — 86900 BLOOD TYPING SEROLOGIC ABO: CPT

## 2019-02-03 RX ORDER — LACTOBACILLUS ACIDOPHILUS 100MM CELL
1 CAPSULE ORAL
Qty: 0 | Refills: 0 | DISCHARGE
Start: 2019-02-03

## 2019-02-03 RX ORDER — OXYMETAZOLINE HYDROCHLORIDE 0.5 MG/ML
1 SPRAY NASAL ONCE
Qty: 0 | Refills: 0 | Status: COMPLETED | OUTPATIENT
Start: 2019-02-03 | End: 2019-02-03

## 2019-02-03 RX ORDER — POLYETHYLENE GLYCOL 3350 17 G/17G
17 POWDER, FOR SOLUTION ORAL DAILY
Qty: 0 | Refills: 0 | Status: DISCONTINUED | OUTPATIENT
Start: 2019-02-03 | End: 2019-02-03

## 2019-02-03 RX ORDER — SENNA PLUS 8.6 MG/1
2 TABLET ORAL AT BEDTIME
Qty: 0 | Refills: 0 | Status: DISCONTINUED | OUTPATIENT
Start: 2019-02-03 | End: 2019-02-03

## 2019-02-03 RX ORDER — MULTIVIT-MIN/FERROUS GLUCONATE 9 MG/15 ML
1 LIQUID (ML) ORAL DAILY
Qty: 0 | Refills: 0 | Status: DISCONTINUED | OUTPATIENT
Start: 2019-02-03 | End: 2019-02-03

## 2019-02-03 RX ORDER — OXYMETAZOLINE HYDROCHLORIDE 0.5 MG/ML
1 SPRAY NASAL
Qty: 0 | Refills: 0 | Status: DISCONTINUED | OUTPATIENT
Start: 2019-02-03 | End: 2019-02-03

## 2019-02-03 RX ORDER — FOLIC ACID 0.8 MG
1 TABLET ORAL DAILY
Qty: 0 | Refills: 0 | Status: DISCONTINUED | OUTPATIENT
Start: 2019-02-03 | End: 2019-02-03

## 2019-02-03 RX ORDER — LEVOTHYROXINE SODIUM 125 MCG
12.5 TABLET ORAL DAILY
Qty: 0 | Refills: 0 | Status: DISCONTINUED | OUTPATIENT
Start: 2019-02-03 | End: 2019-02-03

## 2019-02-03 RX ORDER — ACETAMINOPHEN 500 MG
650 TABLET ORAL EVERY 6 HOURS
Qty: 0 | Refills: 0 | Status: DISCONTINUED | OUTPATIENT
Start: 2019-02-03 | End: 2019-02-03

## 2019-02-03 RX ORDER — LACTOBACILLUS ACIDOPHILUS 100MM CELL
1 CAPSULE ORAL
Qty: 0 | Refills: 0 | Status: DISCONTINUED | OUTPATIENT
Start: 2019-02-03 | End: 2019-02-03

## 2019-02-03 RX ORDER — OXYMETAZOLINE HYDROCHLORIDE 0.5 MG/ML
1 SPRAY NASAL
Qty: 1 | Refills: 1 | OUTPATIENT
Start: 2019-02-03

## 2019-02-03 RX ORDER — DOCUSATE SODIUM 100 MG
100 CAPSULE ORAL THREE TIMES A DAY
Qty: 0 | Refills: 0 | Status: DISCONTINUED | OUTPATIENT
Start: 2019-02-03 | End: 2019-02-03

## 2019-02-03 RX ORDER — CHOLECALCIFEROL (VITAMIN D3) 125 MCG
1000 CAPSULE ORAL DAILY
Qty: 0 | Refills: 0 | Status: DISCONTINUED | OUTPATIENT
Start: 2019-02-03 | End: 2019-02-03

## 2019-02-03 RX ADMIN — Medication 1 TABLET(S): at 00:30

## 2019-02-03 RX ADMIN — OXYMETAZOLINE HYDROCHLORIDE 1 SPRAY(S): 0.5 SPRAY NASAL at 12:53

## 2019-02-03 RX ADMIN — Medication 1 MILLIGRAM(S): at 12:53

## 2019-02-03 RX ADMIN — Medication 1000 UNIT(S): at 12:53

## 2019-02-03 RX ADMIN — Medication 100 MILLIGRAM(S): at 12:53

## 2019-02-03 RX ADMIN — Medication 1 TABLET(S): at 12:53

## 2019-02-03 RX ADMIN — OXYMETAZOLINE HYDROCHLORIDE 1 SPRAY(S): 0.5 SPRAY NASAL at 11:09

## 2019-02-03 NOTE — ED ADULT NURSE REASSESSMENT NOTE - NS ED NURSE REASSESS COMMENT FT1
Report received from Ania FUNK md aware of vitals. safety maintained. pt mentating at baseline. no change in neuro status. pt denies pain at this time. pt placed in position of comfort, given warm blankets. safety maintained. will continue to monitor.

## 2019-02-03 NOTE — CONSULT NOTE ADULT - SUBJECTIVE AND OBJECTIVE BOX
89 year old female with a right sided nose bleed that has packed in the ER. She went home and had bleeding thru the balloon packing. She feels better with some mild pressure and drainage in her right nose and down her throat.   Patient has some pain and discomfort in her right nares. She has some fresh blood dripping in her nose and down her throat.   Patient is on aspirin daily.       PAST MEDICAL/SURGICAL/FAMILY/SOCIAL HISTORY:    Past Medical History:  Anemia    Bronchitis    Constipation    Diverticulitis    Diverticulitis    Fall    GERD (gastroesophageal reflux disease)    Hypothyroid    Osteoporosis    Pelvic fracture    Reflux.     Past Surgical History:  Hip fracture requiring operative repair  right  Ovarian cyst  right  S/P appendectomy    S/P laparoscopic cholecystectomy    S/P small bowel resection.     Tobacco Usage:  · Tobacco Usage	Unknown if ever smoked 	    ALLERGIES AND HOME MEDICATIONS:   Allergies:        Allergies:  	No Known Allergies:     Home Medications:   * Incomplete Medication History as of 01-Feb-2019 23:46 documented in Structured Notes  · 	amLODIPine 2.5 mg oral tablet: Last Dose Taken:  , 1 tab(s) orally once a day  · 	sodium chloride 0.65% nasal spray: Last Dose Taken:  , 1 spray(s) nasal 4 times a day, As Needed  · 	senna oral tablet: Last Dose Taken:  , 2 tab(s) orally once a day (at bedtime)  · 	docusate sodium 100 mg oral capsule: Last Dose Taken:  , 1 cap(s) orally 3 times a day  · 	lidocaine 5% topical film: Last Dose Taken:  , Apply topically to affected area once a day PRN pain  · 	acetaminophen 325 mg oral tablet: Last Dose Taken:  , 2 tab(s) orally every 6 hours  · 	Centrum oral tablet: Last Dose Taken:  , 1 tab(s) orally once a day  · 	traMADol 50 mg oral tablet: Last Dose Taken:  , 37.5 milligram(s) orally every 4 hours, As Needed   · 	Ultracet 37.5 mg-325 mg oral tablet: 1 tab(s) orally every 4 hours  · 	raNITIdine 150 mg oral capsule: 1 cap(s) orally once a day (at bedtime)  · 	levothyroxine: Last Dose Taken:  , 12.5 microgram(s) orally once a day  · 	Systane Balance ophthalmic solution: Last Dose Taken:  , 2 drop(s) to each affected eye 2 times a day  · 	Zantac 300 oral tablet: Last Dose Taken:  , 1 tab(s) orally once a day (at bedtime)  · 	MiraLax oral powder for reconstitution: Last Dose Taken:  , 1 cap(s) orally every other day  · 	PreserVision AREDS 2 oral capsule: Last Dose Taken:  , 1 cap(s) orally 2 times a day  · 	Probiotic: Last Dose Taken:  , 1 tab(s) orally once a day  · 	Vitamin D3 2000 intl units oral tablet: Last Dose Taken:  , 1 tab(s) orally 2 times a day  · 	Calcium 600+D: Last Dose Taken:  , 1 tab(s) orally 2 times a day  · 	Aspirin Enteric Coated 81 mg oral delayed release tablet: Last Dose Taken:  , 1 tab(s) orally once a day  · 	folic acid 0.4 mg oral tablet: Last Dose Taken:  , 1 tab(s) orally once a day  · 	Vitamin B Complex 100: Last Dose Taken:  , 1 tab(s) orally once a day  · 	Slow Fe (as elemental iron) 45 mg oral tablet, extended release: Last Dose Taken:  , 1 tab(s) orally once a day  · 	Vitamin B12: Last Dose Taken:  , 1 tab(s) orally once a day      PE: Alert elderly female in mild distress  Balloon in her right nares.   Oc/OP small trickle of fresh blood noted  Neck Supple        Procedure control of anterior epistaxis-complex  The balloon was removed and the nares was packed with Surgicel soaked in thrombin  the bleeding stopped and she tolerated the procedure well.      Impression: right sided epistaxis  Plan: D/C home on oral Abx, keep Surigcel moist with Saline every 2-3 hours, Afrin only for any bleeding.   F/U  in one week for repeat evaluation.

## 2019-02-03 NOTE — ED ADULT NURSE REASSESSMENT NOTE - NS ED NURSE REASSESS COMMENT FT1
Nasal packing in place, daughter states moms nose continues to bleed. Dr. Huizar made aware, will continue to monitor.

## 2019-02-03 NOTE — DISCHARGE NOTE ADULT - MEDICATION SUMMARY - MEDICATIONS TO TAKE
I will START or STAY ON the medications listed below when I get home from the hospital:    Probiotic  -- 1 tab(s) by mouth once a day  -- Indication: For =    traMADol 50 mg oral tablet  -- 37.5 milligram(s) by mouth every 4 hours, As Needed   -- Indication: For =    Ultracet 37.5 mg-325 mg oral tablet  -- 1 tab(s) by mouth every 4 hours  -- Indication: For =    Aspirin Enteric Coated 81 mg oral delayed release tablet  -- 1 tab(s) by mouth once a day  -- Indication: For =    Zantac 300 oral tablet  -- 1 tab(s) by mouth once a day (at bedtime)  -- Indication: For =    raNITIdine 150 mg oral capsule  -- 1 cap(s) by mouth once a day (at bedtime)  -- Indication: For =    MiraLax oral powder for reconstitution  -- 1 cap(s) by mouth every other day  -- Indication: For =    docusate sodium 100 mg oral capsule  -- 1 cap(s) by mouth 3 times a day  -- Indication: For =    senna oral tablet  -- 2 tab(s) by mouth once a day (at bedtime)  -- Indication: For =    oxymetazoline 0.05% nasal spray  -- 1 spray(s) into nose every 6 hours   -- Indication: For =    Systane Balance ophthalmic solution  -- 2 drop(s) to each affected eye 2 times a day  -- Indication: For =    amoxicillin 875 mg oral tablet  -- 1 tab(s) by mouth every 12 hours  -- Indication: For =    lactobacillus acidophilus oral capsule  -- 1 tab(s) by mouth 2 times a day  -- Indication: For =    levothyroxine  -- 12.5 microgram(s) by mouth once a day  -- Indication: For =    PreserVision AREDS 2 oral capsule  -- 1 cap(s) by mouth 2 times a day  -- Indication: For =    Vitamin B Complex 100  -- 1 tab(s) by mouth once a day  -- Indication: For =    Calcium 600+D  -- 1 tab(s) by mouth 2 times a day  -- Indication: For =    Centrum oral tablet  -- 1 tab(s) by mouth once a day  -- Indication: For =    Vitamin D3 2000 intl units oral tablet  -- 1 tab(s) by mouth 2 times a day  -- Indication: For =    Vitamin B12  -- 1 tab(s) by mouth once a day  -- Indication: For =    folic acid 0.4 mg oral tablet  -- 1 tab(s) by mouth once a day  -- Indication: For =

## 2019-02-03 NOTE — DISCHARGE NOTE ADULT - PATIENT PORTAL LINK FT
You can access the [facility] Patient Portal, offered by [facility], by registering with the following website: [email or web address]

## 2019-02-03 NOTE — DISCHARGE NOTE ADULT - CARE PROVIDER_API CALL
Roger Ross)  Otolaryngology  [address], [address]  [place] [address]  Phone: [phone]  Fax: [phone]

## 2019-02-03 NOTE — PROGRESS NOTE ADULT - SUBJECTIVE AND OBJECTIVE BOX
PULMONARY/CRITICAL CARE      INTERVAL HPI/OVERNIGHT EVENTS:    89y FemaleHPI: Pt admitted for recurrent epistaxis. Now had additional packing, not bleeding now but coughing up some blood streaked sputum.        PAST MEDICAL & SURGICAL HISTORY:  Diverticulitis  Pelvic fracture  Fall  Anemia  Constipation  Osteoporosis  Diverticulitis  GERD (gastroesophageal reflux disease)  Hypothyroid  Reflux  Bronchitis  S/P laparoscopic cholecystectomy  S/P small bowel resection  Ovarian cyst: right  S/P appendectomy  Hip fracture requiring operative repair: right        ICU Vital Signs Last 24 Hrs  T(C): 36.6 (03 Feb 2019 09:45), Max: 37.2 (03 Feb 2019 05:06)  T(F): 97.9 (03 Feb 2019 09:45), Max: 98.9 (03 Feb 2019 05:06)  HR: 69 (03 Feb 2019 09:45) (69 - 96)  BP: 124/84 (03 Feb 2019 09:45) (122/68 - 134/79)  BP(mean): --  ABP: --  ABP(mean): --  RR: 19 (03 Feb 2019 09:45) (16 - 19)  SpO2: 95% (03 Feb 2019 09:45) (94% - 98%)    Qtts:     I&O's Summary          REVIEW OF SYSTEMS:    CONSTITUTIONAL: No fever, weight loss, or fatigue  EYES: No eye pain, visual disturbances, or discharge  ENMT:  No difficulty hearing, tinnitus, vertigo; No sinus or throat pain  Recurrent nosebleed.  RESPIRATORY: No cough, wheezing, chills or hemoptysis; No shortness of breath  CARDIOVASCULAR: No chest pain, palpitations, dizziness, or leg swelling  GASTROINTESTINAL: No abdominal or epigastric pain. No nausea, vomiting, or hematemesis; No diarrhea or constipation. No melena or hematochezia.  GENITOURINARY: No dysuria, frequency, hematuria, or incontinence  NEUROLOGICAL: No headaches, memory loss, loss of strength, numbness, or tremors  SKIN: No itching, burning, rashes, or lesions   LYMPH NODES: No enlarged glands  ENDOCRINE: No heat or cold intolerance; No hair loss  MUSCULOSKELETAL: No joint pain or swelling; No muscle, back, or extremity pain, no calf tenderness      GENERAL: NAD, well-groomed, well-developed, mild gen distress  HEAD:  Atraumatic, Normocephalic  EYES: EOMI, PERRLA, conjunctiva and sclera clear  ENMT: No tonsillar erythema, exudates, or enlargement; Moist mucous membranes, Good dentition, No lesions  Right nostril packed with crusted blood.  NECK: Supple, No JVD, Normal thyroid  NERVOUS SYSTEM:  Alert & Oriented X3, Good concentration; Motor Strength 5/5 B/L upper and lower extremities  CHEST/LUNG: Clear to percussion bilaterally; No rales, rhonchi, wheezing, or rubs  HEART: Regular rate and rhythm; No murmurs, rubs, or gallops  ABDOMEN: Soft, Nontender, Nondistended; Bowel sounds present multiple hernias  EXTREMITIES:  2+ Peripheral Pulses, No clubbing, cyanosis, or edema  LYMPH: No lymphadenopathy noted  SKIN: No rashes or lesions        LABS:                        11.2   9.36  )-----------( 196      ( 03 Feb 2019 02:44 )             33.4     02-03    140  |  107  |  30<H>  ----------------------------<  124<H>  4.5   |  27  |  0.63    Ca    9.8      03 Feb 2019 02:44    TPro  6.8  /  Alb  3.6  /  TBili  0.5  /  DBili  x   /  AST  15  /  ALT  24  /  AlkPhos  42  02-03    PT/INR - ( 02 Feb 2019 00:30 )   PT: 11.9 sec;   INR: 1.04 ratio               vanco through     RADIOLOGY & ADDITIONAL STUDIES:  < from: Xray Chest 2 Views PA/Lat (02.03.19 @ 03:14) >  EXAM:  XR CHEST PA LAT 2V                            PROCEDURE DATE:  02/03/2019          INTERPRETATION:  History: Epistaxis, admission    PA and lateral radiographs are compared to 3/20/2018.    The heart is not enlarged. The trachea is midline. There is no focal   infiltrate or pleural effusion. There is osteopenia of the bony   structures. Old left mid posterior rib fracture is noted. Severe   compression fracture in the lower thoracic spine is also seen.    Impression: No active pulmonary disease.                LORETTA FELIX M.D.,ATTENDING RADIOLOGIST  This document has been electronically signed. Feb  3 2019  8:45AM              < end of copied text >      CRITICAL CARE TIME SPENT:

## 2019-02-03 NOTE — H&P ADULT - HISTORY OF PRESENT ILLNESS
This is a 89 F comes with recurrent nose bleed. She was here on 2/1/19 with nose bleed. It was packed. Went home. Bleeding recurred. Came back to ER.

## 2019-02-03 NOTE — DISCHARGE NOTE ADULT - CARE PLAN
Principal Discharge DX:	Epistaxis  Goal:	.  Assessment and plan of treatment:	Finish course of antibiotics.  Follow-up with Dr Ross in 1 week

## 2019-02-03 NOTE — DISCHARGE NOTE ADULT - HOSPITAL COURSE
This is a 89 F comes with recurrent nose bleed. She was here on 2/1/19 with nose bleed. It was packed. Went home. Bleeding recurred. Came back to ER. Seen by ENT. Repacked with surgicell. Stable. Bleeding resolved.  Go home on po abx   F/U ENT 1 week.    >30 minutes spent on discharge

## 2019-09-02 PROBLEM — Z09 FOLLOW UP: Status: ACTIVE | Noted: 2018-04-23

## 2019-09-23 ENCOUNTER — TRANSCRIPTION ENCOUNTER (OUTPATIENT)
Age: 84
End: 2019-09-23

## 2019-10-08 ENCOUNTER — RESULT REVIEW (OUTPATIENT)
Age: 84
End: 2019-10-08

## 2019-10-26 ENCOUNTER — EMERGENCY (EMERGENCY)
Facility: HOSPITAL | Age: 84
LOS: 1 days | Discharge: HOME | End: 2019-10-26
Attending: EMERGENCY MEDICINE | Admitting: EMERGENCY MEDICINE
Payer: MEDICARE

## 2019-10-26 VITALS
TEMPERATURE: 98 F | DIASTOLIC BLOOD PRESSURE: 100 MMHG | HEART RATE: 82 BPM | WEIGHT: 87.96 LBS | RESPIRATION RATE: 16 BRPM | HEIGHT: 59 IN | SYSTOLIC BLOOD PRESSURE: 158 MMHG | OXYGEN SATURATION: 95 %

## 2019-10-26 VITALS
HEART RATE: 72 BPM | RESPIRATION RATE: 16 BRPM | DIASTOLIC BLOOD PRESSURE: 72 MMHG | SYSTOLIC BLOOD PRESSURE: 106 MMHG | OXYGEN SATURATION: 95 %

## 2019-10-26 DIAGNOSIS — Z90.49 ACQUIRED ABSENCE OF OTHER SPECIFIED PARTS OF DIGESTIVE TRACT: Chronic | ICD-10-CM

## 2019-10-26 PROCEDURE — 73000 X-RAY EXAM OF COLLAR BONE: CPT

## 2019-10-26 PROCEDURE — 73030 X-RAY EXAM OF SHOULDER: CPT | Mod: 26,RT

## 2019-10-26 PROCEDURE — 99284 EMERGENCY DEPT VISIT MOD MDM: CPT | Mod: 25

## 2019-10-26 PROCEDURE — 73000 X-RAY EXAM OF COLLAR BONE: CPT | Mod: 26,RT

## 2019-10-26 PROCEDURE — 73060 X-RAY EXAM OF HUMERUS: CPT

## 2019-10-26 PROCEDURE — 73030 X-RAY EXAM OF SHOULDER: CPT

## 2019-10-26 PROCEDURE — 97161 PT EVAL LOW COMPLEX 20 MIN: CPT

## 2019-10-26 PROCEDURE — 99284 EMERGENCY DEPT VISIT MOD MDM: CPT

## 2019-10-26 PROCEDURE — 73060 X-RAY EXAM OF HUMERUS: CPT | Mod: 26,RT

## 2019-10-26 NOTE — ED ADULT NURSE NOTE - OBJECTIVE STATEMENT
pt to er states she fell this morning daughter was able to catch her and glide her to the floor denies loc c/o right shoulder pain

## 2019-10-26 NOTE — ED PROVIDER NOTE - CARE PROVIDER_API CALL
Munir Beatty (DO)  Orthopaedic Surgery  [address] [place] [address]  Phone: [phone]  Fax: [phone]  Follow Up Time:

## 2019-10-26 NOTE — ED PROVIDER NOTE - PATIENT PORTAL LINK FT
You can access the FollowMyHealth Patient Portal offered by [facility] by registering at the following website: [email or web address]. By joining [employer]’s FollowMyHealth portal, you will also be able to view your health information using other applications (apps) compatible with our system.

## 2019-10-26 NOTE — PHYSICAL THERAPY INITIAL EVALUATION ADULT - ADDITIONAL COMMENTS
Pt lives with her daughter in a house, + steps.  Pt ambulates with RW and supervision and daughter assists with ADLs as needed. Pt has transport chair for longer distances. Pt has private aide 5xwk for 5  hours

## 2019-10-26 NOTE — ED PROVIDER NOTE - NSFOLLOWUPINSTRUCTIONS_ED_ALL_ED_FT
Rest  Wear sling for the next few days  Motrin for pain if needed  Follow-up with your doctor this week  Return here if needed

## 2019-10-26 NOTE — ED ADULT TRIAGE NOTE - CHIEF COMPLAINT QUOTE
fell while using a walker to walk to the bathroom, fell onto the daughter, as per daughter, patient did not loose consciousness, c/o right shoulder pain and back pain

## 2019-10-26 NOTE — ED PROVIDER NOTE - OBJECTIVE STATEMENT
89 yo white female stumbled in bathroom earlier this morning while being assisted by daughter, struck shoulder on ??? wall and since that time has been experiencing right shoulder pain, sharp, non radiating, increased with movement and better with rest. Did not hit head. Denies any neck, chest, abdomen, back or hip pains.

## 2019-10-26 NOTE — ED ADULT NURSE NOTE - NSIMPLEMENTINTERV_GEN_ALL_ED
Implemented All Fall with Harm Risk Interventions:  [place] to call system. Call bell, personal items and telephone within reach. Instruct patient to call for assistance. Room bathroom lighting operational. Non-slip footwear when patient is off stretcher. Physically safe environment: no spills, clutter or unnecessary equipment. Stretcher in lowest position, wheels locked, appropriate side rails in place. Provide visual cue, wrist band, yellow gown, etc. Monitor gait and stability. Monitor for mental status changes and reorient to person, place, and time. Review medications for side effects contributing to fall risk. Reinforce activity limits and safety measures with patient and family. Provide visual clues: red socks.

## 2019-10-26 NOTE — ED PROVIDER NOTE - CARE PLAN
Endocrinology Clinic Progress Note  PCP: Rogers Mahmood M.D.    HPI:  Type 2 diabetes: She is currently on bydureon 2 mg once a week, metformin 1000 mg twice a day, glimepiride 4 mg once a day and Invokana 300 mg daily. She reports compliance with medications. She checks blood sugars usually once a day, most readings are in the range of .  No hypoglycemia. No numbness and tingling in feet. She is up-to-date with eye exam.    Patient has had steroid injections for pain control for foot issue and is now taking medrol dose pack.     HPI:  Shirley Unger is a 65 y.o. old patient who comes in today for evaluation of above stated problem.    Most Recent HbA1c:   Lab Results   Component Value Date/Time    HBA1C 8.0 (H) 03/02/2019 07:57 AM        Current Diabetes Regimen:  GLP-1 Agent: Exenatide once weekly (2mg)   SGLT-2 Inhibitor:  Canagliflozin 300 mg once daily     Other: metformin 1000 mg BID. GLimeperide 4 mg daily, welchol 625 mg daily    Before Breakfast:  Before Lunch:  Before Dinner:  Before Bedtime:   Has not been taking FSBS due to high blood sugars while on steroids    ROS:  Constitutional: No weight loss  Cardiac: No palpitations or racing heart  Resp: No shortness of breath  Neuro: No numbness or tinging in feet  Endo: No heat or cold intolerance, no polyuria or polydipsia  All other systems were reviewed and were negative.    Past Medical History:  Patient Active Problem List    Diagnosis Date Noted   • Huynh's neuroma of left foot 02/27/2019   • Primary insomnia 06/12/2018   • Obesity (BMI 30-39.9) 09/28/2017   • Seasonal allergic rhinitis due to pollen 03/07/2017   • Herpes dermatitis 11/29/2016   • Uncontrolled type 2 diabetes mellitus without complication, without long-term current use of insulin (HCC) 08/04/2015   • Vitamin D deficiency disease 07/08/2013   • Dyslipidemia    • Recurrent nephrolithiasis 03/22/2013       Past Surgical History:  Past Surgical History:   Procedure Laterality Date   •  GANGLION EXCISION  2014    Performed by Efra Ramirez M.D. at SURGERY [facility]   • HYSTERECTOMY, TOTAL ABDOMINAL      For non cancerous reasons   • CHOLECYSTECTOMY     • CARPAL TUNNEL RELEASE      Right hand   • CYSTOSCOPY STENT PLACEMENT      several, stent removed in MD office   • PB  DELIVERY ONLY         Allergies:  Macrobid [nitrofurantoin monohydrate macrocrystals] and Pcn [penicillins]    Social History:  Social History     Social History   • Marital status:      Spouse name: N/A   • Number of children: N/A   • Years of education: N/A     Occupational History   •  [employer]     Social History Main Topics   • Smoking status: Never Smoker   • Smokeless tobacco: Never Used   • Alcohol use No      Comment: Rare Useage   • Drug use: No   • Sexual activity: Yes     Partners: Male     Other Topics Concern   • Not on file     Social History Narrative   • No narrative on file       Family History:  Family History   Problem Relation Age of Onset   • Cancer Mother         Lung   • Heart Disease Father    • Diabetes Maternal Aunt    • Diabetes Maternal Grandfather        Medications:    Current Outpatient Prescriptions:   •  lisinopril (PRINIVIL) 5 MG Tab, Take 0.5 Tabs by mouth every day., Disp: 90 Tab, Rfl: 0  •  INVOKANA 300 MG Tab, TAKE ONE TABLET BY MOUTH ONCE DAILY, Disp: 90 Tab, Rfl: 3  •  glucose blood (FREESTYLE LITE) strip, 1 Strip by Other route as needed., Disp: 100 Strip, Rfl: 3  •  metformin (GLUCOPHAGE) 1000 MG tablet, Take 1 Tab by mouth 2 times a day, with meals., Disp: 180 Tab, Rfl: 3  •  colesevelam (WELCHOL) 625 MG Tab, TAKE ONE TABLET BY MOUTH ONCE DAILY IN THE MORNING, THEN TAKE TWO TABLETS DAILY IN THE EVENING WITH  DINNER, Disp: 270 Tab, Rfl: 3  •  atorvastatin (LIPITOR) 20 MG Tab, TAKE ONE TABLET BY MOUTH ONCE DAILY, Disp: 90 Tab, Rfl: 3  •  glimepiride (AMARYL) 4 MG Tab, TAKE ONE TABLET BY MOUTH ONCE DAILY IN THE MORNING, Disp: 180  "Tab, Rfl: 3  •  BYDUREON 2 MG Pen-injector, INJECT ONE PEN SUBCUTANEOUSLY ONCE A WEEK, Disp: 12 Each, Rfl: 3  •  Lancets MISC, Lancets order: Lancets for Abbott Freestyle Lite meter. Sig: use BID and prn ssx high or low sugar. #100 RF x 3, Disp: 100 Each, Rfl: 3  •  Blood Glucose Monitoring Suppl SUPPLIES MISC, Test strips order: Test strips for Abbott Freestyle Lite meter. Sig: use BID and prn ssx high or low sugar #100 RF x 3, Disp: 100 Each, Rfl: 3  •  aspirin (ASA) 81 MG CHEW, Take 81 mg by mouth every day.  , Disp: , Rfl:   •  POTASSIUM CITRATE PO, Take 2 Tabs by mouth 3 times a day., Disp: , Rfl:   •  triamcinolone acetonide (KENALOG) 0.1 % Ointment, Apply  to affected area(s) 2 times a day., Disp: , Rfl:   •  mupirocin calcium (BACTROBAN) 2 % Cream, Apply to affected area(s) 2 times a day., Disp: 15 g, Rfl: 2  •  cetirizine (ZYRTEC) 10 MG Tab, Take 1 Tab by mouth every day., Disp: 30 Tab, Rfl: 3  •  fluticasone (FLONASE) 50 MCG/ACT nasal spray, Spray 2 Sprays in nose every day., Disp: 16 g, Rfl: 0  •  trolamine salicylate (ASPERCREME/ALOE) 10 % cream, Apply 1 Squirt to affected area(s) 4 times a day., Disp: 1 Tube, Rfl: 3    Labs: Reviewed    Physical Examination:  Vital signs: /58 (BP Location: Left arm, Patient Position: Sitting, BP Cuff Size: Adult)   Pulse 69   Resp 17   Ht 1.702 m (5' 7\")   Wt 94.3 kg (208 lb)   LMP 11/29/2004   SpO2 97%   BMI 32.58 kg/m²  Body mass index is 32.58 kg/m².  General: No apparent distress, cooperative  Eyes: No scleral icterus or discharge  ENMT: Normal on external inspection of nose, lips, normal thyroid exam  Neck: No abnormal masses on inspection  Resp: Normal effort, clear to auscultation bilaterally   CVS: Regular rate and rhythm, S1 S2 normal, no murmur   Extremities: No edema  Abdomen: abdominal obesity present  Neuro: Alert and oriented  Skin: No rash  Psych: Normal mood and affect, intact memory and able to make informed decisions    Foot " Exam:  Monofilament: done  Monofilament testing with a 10 gram force: sensation intact: intact bilaterally  Visual Inspection: Feet without maceration, ulcers, fissures.  Pedal pulses: intact bilaterally    Assessment and Plan:    1. Type 2 diabetes mellitus without complication, without long-term current use of insulin (HCC)  Continue current regimen.  Once she is finished with the steroids hopefully the blood sugars will come down.  We will await to see how the blood sugars trend lower when she stops the steroids.  We will reassess the situation in the next appointment and go from there.     - Discussed diabetic diet discussed in detail-plate method.  - She will test before meals and log .  - She will walk for 20-30 minutes daily.  - Reviewed medications and advised how to take   - Discussed importance of immunizations and yearly eye exams.   - Encouraged patient to attend diabetes education program.   - Advised daily foot exams. Educated on signs of infection.   - Educational material distributed.   - Educated on need to stay well hydrated with water.  - Educated to call with any questions or problems.    2. Obesity (BMI 30-39.9)  Emphasized on diet and exercise measures again for weight loss    3. Mixed hyperlipidemia  Continue statin.    4. Essential hypertension  Controlled.    Return in about 2 months (around 6/22/2019).    Thank you for allowing me to participate in the care of this patient.    Dr. Joey Coyne  This note was scribed bySherni Kay RN, BSN  04/22/19    CC:   Rogers Mahmood M.D.    This note was created using voice recognition software (Dragon). The accuracy of the dictation is limited by the abilities of the software. I have reviewed the note prior to signing, however some errors in grammar and context are still possible. If you have any questions related to this note please do not hesitate to contact our office.    Principal Discharge DX:	Contusion of right shoulder, initial encounter

## 2020-01-07 NOTE — ED ADULT NURSE NOTE - EXTENSIONS OF SELF_ADULT
EXAM DESCRIPTION: 

Maxillofacial: Computed Tomography.



CLINICAL HISTORY: 

61 years Female HEADACHE



COMPARISON: 

None Available.



TECHNIQUE: 

Spiral, axial 2.5 x 2.5 mm scans through the paranasal sinuses

and maxillofacial bones without contrast. Axial soft tissue and

bone algorithm. Coronal and sagittal 2.0 mm reconstructions. 

Total Exam DLP: 257.1 mGy-cm.  This exam was performed according

to our departmental CT dose-optimization program which includes

automated exposure control, adjustment of the mA and/or kV

according to patient size and/or use of iterative reconstruction

technique; to reduce radiation dose to as low as reasonably

achievable (ALARA).



FINDINGS: 

Right maxillary antrum is almost completely opacified with only a

small pocket of air noted anteriorly. Ostiomeatal unit is

obstructed on the right. Posterior and anterior right ethmoid air

cells are almost completely opacified.. Minimal thickening in the

base of the left maxillary antrum. Minimal narrowing of the left

ostiomeatal unit but no obstruction. Fluid and mucosal thickening

in the posterior left ethmoid air cells.



Mucoperiosteal thickening in the bilateral sphenoid air cells.

Sphenoid no nasal ostia are partially obstructed bilaterally. No

air-fluid levels. Frontal sinuses are underdeveloped. Fluid and

or mucosal thickening involving most of the right frontal sinus

with the left compartment well aerated.



Nasal septum is significantly deviated to the right of midline

with right side spur in the mid posterior aspect. Less right

deviation of the anterior septum. Minimal turbinate mucosal

hypertrophy bilaterally. Right superior nasal passageway is

partially obstructed.



No definite bone destruction. Included mastoid air cells are well

aerated bilaterally with the internal auditory ossicles grossly

normal and no fluid in the tympanic cavity or significant

thickening of the tympanic membrane.



IMPRESSION: 

1. Acute and chronic sinusitis involving mostly the right

maxillary antra, right sphenoid air cells, with obstruction of

the right ostiomeatal unit. Also involvement of the right frontal

sinus cavity.

2. Minimal fluid/thickening in the left posterior ethmoid air

cells and minimal narrowing of the left ostiomeatal unit.

3. Bilateral chronic thickening of the sphenoid air cells.

Sphenoid nasal ostia are partially obstructed.

4. Nasal septum is significantly deviated to the right more

posterior and mid septum than anterior septum.



Electronically signed by:  Kamran Power MD  1/7/2020 5:42 PM CST

Workstation: [id number] None

## 2020-09-01 NOTE — ED ADULT NURSE NOTE - CAS EDN DISCHARGE ASSESSMENT
ADMIT DATE:  09/01/2020



HISTORY OF PRESENT ILLNESS:  The patient is a 28-year-old male patient, who

presented to the Emergency Room of via EMS as a code stroke.  The patient has

reported to have left-sided weakness and facial droop on the left that started

around 1:00 a.m.  The patient was having an argument with his significant other

when he started to notice numbness and sleepy sensation on the left leg and then

his left arm.  The patient had just fallen asleep, so he went to stand up and he

immediately collapsed.  He was unable to lift his left arm and left leg.  He

also had left-sided facial droop.  He stated that he had decreased sensation on

the left side of his face and arm.  Has decreased, but present sensation of the

left lower extremity.  He does not feel pinprick or light touch.  He states that

he has never had symptoms like this before.  He denies any alcohol or drug

abuse.  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

diagnostic brain trauma.  He was evaluated in the Emergency Room and apparently

had a CT scan of the head, which showed no intracranial hemorrhage.  There is

some fluid in the left external auditory canal and middle ear.  We would

correlate with symptoms in the region to ensure that there is not otitis externa

or otitis media.  Some of the adjacent mastoid air cells are also opacified,

which could be from congestion or mastoiditis.  Apparently, the patient came

within the window for TPA and has received TPA and was transferred to [facility] ICU as per protocol.  When he arrived to the Emergency Room, he

apparently was awake, alert, responding appropriately.  He has had a CT scan of

the head without contrast and then CT angio of the neck and brain and showed

that the vertebral internal and common carotid arteries are patent within the

neck, proximal, middle, anterior and posterior cerebral arteries are patent. 

There is high concern for stroke.  MRI could better assess for a more peripheral

region of ischemia that would not well be seen by the CT.  Has chronic appearing

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  There are

also some degenerative  changes, which are more than typically seen for this

patient's age.  Has prominent pharyngeal adenoid tonsils, could be from

hyperplasia unless the patient is having symptoms of tonsillitis and he  has

also odontogenic disease.



PAST MEDICAL HISTORY:  Significant for congenital deafness.



PAST SURGICAL HISTORY:  Significant for 11 surgeries for his ears according to

him.



ALLERGIES:  He is allergic to CECLOR and AUGMENTIN.



MEDICATIONS:  He is on no medication at home.



FAMILY HISTORY:  He has 1 older sister and 1 younger brother, both are healthy. 

Mother has hypertension and diabetes.  Does not know his father.



SOCIAL HISTORY: He is single, never , has no children.  He does not

smoke, drink alcohol or use recreational drugs.  He is a wrestler.  When I saw

him, his only complaint was pain in his left upper extremity site of peripheral

IV line.



PHYSICAL EXAMINATION:

GENERAL:  When I examined him, he looked well and was clearly in no apparent

respiratory distress.  No pallor, jaundice, cyanosis or thyromegaly.  No jugular

venous distention.  No limb edema.

VITAL SIGNS:  His heart rate was 101, blood pressure was 120/67, his temperature

was 98.1, respiratory rate was 18 and oxygen saturation was 96% on room air.

HEAD, EYES, EARS, NOSE AND THROAT:  Showed normocephalic, atraumatic.

NECK:  Supple.

HEART:  Showed normal first and second heart sounds.  No gallop or murmur.

CHEST:  Clear to auscultation.  No crepitation or rhonchi.

ABDOMEN:  Distended, soft, nontender.

NEUROLOGIC:  He is awake, alert, responding appropriately.  He has mild

left-sided facial droop and some mild weakness in his left lower extremity.



LABORATORY DATA:  Showed a white cell count of 10,000, hemoglobin 15, hematocrit

44, MCV 84 and platelet count 252,000.  Serum sodium 140, potassium 3.9,

chloride 103, bicarbonate 28, anion gap of 9, BUN 13, creatinine 1, estimated

GFR was 89 mL per minute, his glucose 115, calcium was 9.1.  Total bilirubin,

AST, ALT, alkaline phosphatase were normal.  Total protein was 8.4, albumin was

3.8.  His prothrombin time, INR and aPTT were normal.



ASSESSMENT AND PLAN:  The patient was transferred to [facility]. 

We will consult his neurologist as well as cardiologist given his age, he might

have persistent fossa ovalis or ASD or VSD and probably requires a transthoracic

and perhaps even a transesophageal echocardiogram.

 



______________________________

ANITA STEIN MD



DR:  BRISEYDA/rashad  JOB#:  [id number] / [id number]

DD:  09/01/2020 09:06  DT:  09/01/2020 10:23
Alert and oriented to person, place and time

## 2021-08-16 NOTE — ED PROVIDER NOTE - CONSTITUTIONAL, MLM
Impression: Presence of intraocular lens: Z96.1.  Plan: Well centered OU normal... Uncomfortable appearing elderly white female, thin, awake, alert, oriented to person, place, time/situation and in mild apparent distress.

## 2022-01-05 NOTE — ED ADULT NURSE NOTE - NS PRO PASSIVE SMOKE EXP
General Sunscreen Counseling: I recommend a broad spectrum sunscreen with a SPF of 30 or higher, and should be reapplied every 2-3 hours after that as long as sun exposure continues. I also recommend a lip balm with a sunscreen as well. Sun protective clothing and hats can be used but must be worn the entire time you are exposed to the sun's rays.
Detail Level: Detailed
No

## 2022-03-05 NOTE — ED ADULT NURSE NOTE - NAME OF 1ST CONSULTING PHYSICIAN
If new symptoms arise such as difficulty speaking, numbness in arms, balance difficulties at rest or falling to the ground with walking please do not delay and call 911 or go to the ER.     
Luis/Toby

## 2022-05-07 NOTE — ED PROVIDER NOTE - PMH
No Anemia    Bronchitis    Constipation    Diverticulitis    Diverticulitis    Fall    GERD (gastroesophageal reflux disease)    Hypothyroid    Osteoporosis    Pelvic fracture    Reflux

## 2022-06-28 NOTE — PROGRESS NOTE ADULT - PROBLEM/PLAN-4
DISPLAY PLAN FREE TEXT Quality 265: Biopsy Follow-Up: Biopsy results reviewed, communicated, tracked, and documented Detail Level: Zone

## 2022-12-02 NOTE — DISCHARGE NOTE ADULT - NSCORESITESY/N_GEN_A_CORE_RD
Pre-procedure checklist reviewed, AUC complete and pre-sedation note complete.    MD aware of maximum contrast dose of 270 mL.  No

## 2023-04-12 NOTE — PROGRESS NOTE ADULT - PROBLEM SELECTOR PROBLEM 1
Epistaxis 51 year old female with a PMHx of DM2, HTN, incarcerated ventral hernia (S/P repair 9/22 - complicated by infected seroma 10/22) and SBO with abdominal wall abscess (S/P IR drain 12/22) presenting to the ED with 3 days of malodorous discharge from wound and burning sensation. On exam pt is well appearing, afebrile, wound vac in place. Will evaluate wound with surgery at bedside. Plan: cbc/cmp, CT abd/pelvis, surgery consult.

## 2023-04-25 NOTE — ED ADULT NURSE REASSESSMENT NOTE - NS ED NURSE REASSESS COMMENT FT1
Plan for transfer to [facility] for trauma consult. Patient and family aware of plan. EMTALA forms signed with daughter Flavia. Report given to transfer center. Awaiting ambulance arrival to transport patient. Patient instructed to remain NPO. Report given to Kanwal FUNK in ED. Safety maintained. Call bell in reach. Home

## 2023-06-27 NOTE — ED ADULT NURSE NOTE - CAS EDP DISCH DISPOSITION ADMI
ICU/SICU Bill For Surgical Tray: no Billing Type: Third-Party Bill Expected Date Of Service: 06/27/2023

## 2023-08-06 ENCOUNTER — INPATIENT (INPATIENT)
Facility: HOSPITAL | Age: 88
LOS: 2 days | Discharge: HOME | DRG: 91 | End: 2023-08-09
Attending: FAMILY MEDICINE | Admitting: INTERNAL MEDICINE
Payer: MEDICARE

## 2023-08-06 VITALS
SYSTOLIC BLOOD PRESSURE: 135 MMHG | DIASTOLIC BLOOD PRESSURE: 73 MMHG | HEART RATE: 90 BPM | HEIGHT: 63 IN | TEMPERATURE: 97 F | RESPIRATION RATE: 17 BRPM | WEIGHT: 115.08 LBS
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
## 2023-08-06 DIAGNOSIS — M25.512 PAIN IN LEFT SHOULDER: ICD-10-CM

## 2023-08-06 DIAGNOSIS — R26.81 UNSTEADINESS ON FEET: ICD-10-CM

## 2023-08-06 DIAGNOSIS — Z90.49 ACQUIRED ABSENCE OF OTHER SPECIFIED PARTS OF DIGESTIVE TRACT: Chronic | ICD-10-CM

## 2023-08-06 DIAGNOSIS — E03.9 HYPOTHYROIDISM, UNSPECIFIED: ICD-10-CM

## 2023-08-06 DIAGNOSIS — K21.9 GASTRO-ESOPHAGEAL REFLUX DISEASE WITHOUT ESOPHAGITIS: ICD-10-CM

## 2023-08-06 DIAGNOSIS — M25.552 PAIN IN LEFT HIP: ICD-10-CM

## 2023-08-06 LAB
ALBUMIN SERPL ELPH-MCNC: 3.2 G/DL — LOW (ref 3.3–5)
ALP SERPL-CCNC: 60 U/L — SIGNIFICANT CHANGE UP (ref 40–120)
ALT FLD-CCNC: 21 U/L — SIGNIFICANT CHANGE UP (ref 12–78)
ANION GAP SERPL CALC-SCNC: 5 MMOL/L — SIGNIFICANT CHANGE UP (ref 5–17)
AST SERPL-CCNC: 16 U/L — SIGNIFICANT CHANGE UP (ref 15–37)
BASOPHILS # BLD AUTO: 0.02 K/UL — SIGNIFICANT CHANGE UP (ref 0–0.2)
BASOPHILS NFR BLD AUTO: 0.4 % — SIGNIFICANT CHANGE UP (ref 0–2)
BILIRUB SERPL-MCNC: 0.6 MG/DL — SIGNIFICANT CHANGE UP (ref 0.2–1.2)
BUN SERPL-MCNC: 19 MG/DL — SIGNIFICANT CHANGE UP (ref 7–23)
CALCIUM SERPL-MCNC: 9.5 MG/DL — SIGNIFICANT CHANGE UP (ref 8.5–10.1)
CHLORIDE SERPL-SCNC: 107 MMOL/L — SIGNIFICANT CHANGE UP (ref 96–108)
CK SERPL-CCNC: 30 U/L — SIGNIFICANT CHANGE UP (ref 26–192)
CO2 SERPL-SCNC: 28 MMOL/L — SIGNIFICANT CHANGE UP (ref 22–31)
CREAT SERPL-MCNC: 0.55 MG/DL — SIGNIFICANT CHANGE UP (ref 0.5–1.3)
EGFR: 81 ML/MIN/1.73M2 — SIGNIFICANT CHANGE UP
EOSINOPHIL # BLD AUTO: 0.08 K/UL — SIGNIFICANT CHANGE UP (ref 0–0.5)
EOSINOPHIL NFR BLD AUTO: 1.7 % — SIGNIFICANT CHANGE UP (ref 0–6)
GLUCOSE SERPL-MCNC: 107 MG/DL — HIGH (ref 70–99)
HCT VFR BLD CALC: 31.5 % — LOW (ref 34.5–45)
HGB BLD-MCNC: 10.5 G/DL — LOW (ref 11.5–15.5)
IMM GRANULOCYTES NFR BLD AUTO: 0.2 % — SIGNIFICANT CHANGE UP (ref 0–0.9)
LYMPHOCYTES # BLD AUTO: 0.89 K/UL — LOW (ref 1–3.3)
LYMPHOCYTES # BLD AUTO: 19.3 % — SIGNIFICANT CHANGE UP (ref 13–44)
MCHC RBC-ENTMCNC: 31.6 PG — SIGNIFICANT CHANGE UP (ref 27–34)
MCHC RBC-ENTMCNC: 33.3 GM/DL — SIGNIFICANT CHANGE UP (ref 32–36)
MCV RBC AUTO: 94.9 FL — SIGNIFICANT CHANGE UP (ref 80–100)
MONOCYTES # BLD AUTO: 0.31 K/UL — SIGNIFICANT CHANGE UP (ref 0–0.9)
MONOCYTES NFR BLD AUTO: 6.7 % — SIGNIFICANT CHANGE UP (ref 2–14)
NEUTROPHILS # BLD AUTO: 3.31 K/UL — SIGNIFICANT CHANGE UP (ref 1.8–7.4)
NEUTROPHILS NFR BLD AUTO: 71.7 % — SIGNIFICANT CHANGE UP (ref 43–77)
NRBC # BLD: 0 /100 WBCS — SIGNIFICANT CHANGE UP (ref 0–0)
PLATELET # BLD AUTO: 227 K/UL — SIGNIFICANT CHANGE UP (ref 150–400)
POTASSIUM SERPL-MCNC: 4.1 MMOL/L — SIGNIFICANT CHANGE UP (ref 3.5–5.3)
POTASSIUM SERPL-SCNC: 4.1 MMOL/L — SIGNIFICANT CHANGE UP (ref 3.5–5.3)
PROT SERPL-MCNC: 6.4 G/DL — SIGNIFICANT CHANGE UP (ref 6–8.3)
RBC # BLD: 3.32 M/UL — LOW (ref 3.8–5.2)
RBC # FLD: 14.1 % — SIGNIFICANT CHANGE UP (ref 10.3–14.5)
SODIUM SERPL-SCNC: 140 MMOL/L — SIGNIFICANT CHANGE UP (ref 135–145)
WBC # BLD: 4.62 K/UL — SIGNIFICANT CHANGE UP (ref 3.8–10.5)
WBC # FLD AUTO: 4.62 K/UL — SIGNIFICANT CHANGE UP (ref 3.8–10.5)

## 2023-08-06 PROCEDURE — 72125 CT NECK SPINE W/O DYE: CPT | Mod: 26,MA

## 2023-08-06 PROCEDURE — 73562 X-RAY EXAM OF KNEE 3: CPT | Mod: 26,LT

## 2023-08-06 PROCEDURE — 99285 EMERGENCY DEPT VISIT HI MDM: CPT

## 2023-08-06 PROCEDURE — 73030 X-RAY EXAM OF SHOULDER: CPT | Mod: 26,LT

## 2023-08-06 PROCEDURE — 73502 X-RAY EXAM HIP UNI 2-3 VIEWS: CPT | Mod: 26,LT

## 2023-08-06 PROCEDURE — 71101 X-RAY EXAM UNILAT RIBS/CHEST: CPT | Mod: 26

## 2023-08-06 PROCEDURE — 73610 X-RAY EXAM OF ANKLE: CPT | Mod: 26,LT

## 2023-08-06 PROCEDURE — 70450 CT HEAD/BRAIN W/O DYE: CPT | Mod: 26,MA

## 2023-08-06 RX ORDER — ACETAMINOPHEN 500 MG
650 TABLET ORAL EVERY 6 HOURS
Refills: 0 | Status: DISCONTINUED | OUTPATIENT
Start: 2023-08-06 | End: 2023-08-09

## 2023-08-06 RX ORDER — TRAMADOL HYDROCHLORIDE 50 MG/1
25 TABLET ORAL EVERY 4 HOURS
Refills: 0 | Status: DISCONTINUED | OUTPATIENT
Start: 2023-08-06 | End: 2023-08-09

## 2023-08-06 RX ORDER — ACETAMINOPHEN 500 MG
975 TABLET ORAL ONCE
Refills: 0 | Status: COMPLETED | OUTPATIENT
Start: 2023-08-06 | End: 2023-08-06

## 2023-08-06 RX ORDER — PANTOPRAZOLE SODIUM 20 MG/1
40 TABLET, DELAYED RELEASE ORAL
Refills: 0 | Status: DISCONTINUED | OUTPATIENT
Start: 2023-08-06 | End: 2023-08-09

## 2023-08-06 RX ORDER — POLYETHYLENE GLYCOL 3350 17 G/17G
17 POWDER, FOR SOLUTION ORAL DAILY
Refills: 0 | Status: DISCONTINUED | OUTPATIENT
Start: 2023-08-06 | End: 2023-08-09

## 2023-08-06 RX ORDER — CHOLECALCIFEROL (VITAMIN D3) 125 MCG
2000 CAPSULE ORAL DAILY
Refills: 0 | Status: DISCONTINUED | OUTPATIENT
Start: 2023-08-06 | End: 2023-08-09

## 2023-08-06 RX ORDER — LEVOTHYROXINE SODIUM 125 MCG
25 TABLET ORAL DAILY
Refills: 0 | Status: DISCONTINUED | OUTPATIENT
Start: 2023-08-06 | End: 2023-08-07

## 2023-08-06 RX ORDER — OXYMETAZOLINE HYDROCHLORIDE 0.5 MG/ML
1 SPRAY NASAL
Refills: 0 | Status: DISCONTINUED | OUTPATIENT
Start: 2023-08-06 | End: 2023-08-09

## 2023-08-06 RX ORDER — ASPIRIN/CALCIUM CARB/MAGNESIUM 324 MG
81 TABLET ORAL DAILY
Refills: 0 | Status: DISCONTINUED | OUTPATIENT
Start: 2023-08-06 | End: 2023-08-09

## 2023-08-06 RX ORDER — HEPARIN SODIUM 5000 [USP'U]/ML
5000 INJECTION INTRAVENOUS; SUBCUTANEOUS EVERY 12 HOURS
Refills: 0 | Status: DISCONTINUED | OUTPATIENT
Start: 2023-08-06 | End: 2023-08-07

## 2023-08-06 RX ADMIN — Medication 1 TABLET(S): at 20:09

## 2023-08-06 RX ADMIN — Medication 975 MILLIGRAM(S): at 13:02

## 2023-08-06 RX ADMIN — HEPARIN SODIUM 5000 UNIT(S): 5000 INJECTION INTRAVENOUS; SUBCUTANEOUS at 20:09

## 2023-08-06 RX ADMIN — Medication 975 MILLIGRAM(S): at 13:55

## 2023-08-06 NOTE — H&P ADULT - NSICDXPASTSURGICALHX_GEN_ALL_CORE_FT
PAST SURGICAL HISTORY:  Hip fracture requiring operative repair right    Ovarian cyst right    S/P appendectomy     S/P laparoscopic cholecystectomy     S/P small bowel resection

## 2023-08-06 NOTE — ED ADULT NURSE NOTE - OBJECTIVE STATEMENT
pt aox3, " guided fall to floor by Daughter today, rt shoulder pain" denies neck pain, FROM 4 extremities , no n/v, no loc

## 2023-08-06 NOTE — ED PROVIDER NOTE - MUSCULOSKELETAL MINIMAL EXAM
Pain in L. shoulder, limited ROM. Has pain in L. hip, but not shortened or externally rotate/ pain in knee and ankle. Sensation intact, no neurological deficits

## 2023-08-06 NOTE — H&P ADULT - NSHPLABSRESULTS_GEN_ALL_CORE
10.5   4.62  )-----------( 227      ( 06 Aug 2023 12:51 )             31.5     06 Aug 2023 12:51    140    |  107    |  19     ----------------------------<  107    4.1     |  28     |  0.55     Ca    9.5        06 Aug 2023 12:51    TPro  6.4    /  Alb  3.2    /  TBili  0.6    /  DBili  x      /  AST  16     /  ALT  21     /  AlkPhos  60     06 Aug 2023 12:51    LIVER FUNCTIONS - ( 06 Aug 2023 12:51 )  Alb: 3.2 g/dL / Pro: 6.4 g/dL / ALK PHOS: 60 U/L / ALT: 21 U/L / AST: 16 U/L / GGT: x             CAPILLARY BLOOD GLUCOSE    CARDIAC MARKERS ( 06 Aug 2023 12:51 )  x     / x     / 30 U/L / x     / x          Urinalysis Basic - ( 06 Aug 2023 12:51 )    Color: x / Appearance: x / SG: x / pH: x  Gluc: 107 mg/dL / Ketone: x  / Bili: x / Urobili: x   Blood: x / Protein: x / Nitrite: x   Leuk Esterase: x / RBC: x / WBC x   Sq Epi: x / Non Sq Epi: x / Bacteria: x    < from: CT Cervical Spine No Cont (08.06.23 @ 14:20) >    IMPRESSION:    No acute intracranial abnormality.    No acute fracture or subluxation of the cervical spine.    --- End of Report ---    < end of copied text >

## 2023-08-06 NOTE — H&P ADULT - NEGATIVE OPHTHALMOLOGIC SYMPTOMS
no diplopia/no photophobia/no lacrimation L/no lacrimation R/no scleral injection L/no scleral injection R

## 2023-08-06 NOTE — ED PROVIDER NOTE - PROGRESS NOTE DETAILS
Pt xrays and ct negative on my read. Pt able to stand, but having difficulty ambulating to baseline, and daughters concerned for further falls. Pt admitted for further care.

## 2023-08-06 NOTE — ED ADULT NURSE NOTE - NSFALLHARMRISKINTERV_ED_ALL_ED
Assistance OOB with selected safe patient handling equipment if applicable/Assistance with ambulation/Communicate risk of Fall with Harm to all staff, patient, and family/Monitor gait and stability/Provide visual cue: red socks, yellow wristband, yellow gown, etc/Reinforce activity limits and safety measures with patient and family/Bed in lowest position, wheels locked, appropriate side rails in place/Call bell, personal items and telephone in reach/Instruct patient to call for assistance before getting out of bed/chair/stretcher/Non-slip footwear applied when patient is off stretcher/[place] to call system/Physically safe environment - no spills, clutter or unnecessary equipment/Purposeful Proactive Rounding/Room/bathroom lighting operational, light cord in reach

## 2023-08-06 NOTE — ED PROVIDER NOTE - CLINICAL SUMMARY MEDICAL DECISION MAKING FREE TEXT BOX
Ddx: ro fractures/ ICH/ contusion  plan: CT head, cspine, xray ribs, shoulder, hip, knee, and ankle, tylenol, reassess

## 2023-08-06 NOTE — ED PROVIDER NOTE - OBJECTIVE STATEMENT
Patient is a 89-year-old lady with a past medical history of GERD, osteoporosis who presents to the ED after a mechanical fall.  She was walking with her walker, and her daughter was with her but she lost her balance and daughter tried to catch her but she fell onto her left side.  No head strike, no LOC.  Daughter was not able to  patient by herself, and had neighbor come help.  Patient is not on any blood thinners, denies any headache.  Has history of pelvic fracture, neck fracture, and normally able to ambulate with assistance. Pain is worst at L. shoulder, knee, ankle, and hip.

## 2023-08-06 NOTE — H&P ADULT - ASSESSMENT
C]Vale Scherer is a 89y Female brouhght to ED after a mechanical fall for shoulder pain/injury and unable to walk.  She was walking with her walker, and her daughter was with her but she lost her balance and daughter tried to catch her but she fell onto her left side.  No head strike, no LOC.  Daughter was not able to  patient by herself, and had neighbor come help.  Patient is not on any blood thinners, denies any headache.  Has history of pelvic fracture, neck fracture, and normally able to ambulate with assistance. Pain is worst in left shoulder, knee, ankle, and hip. C]Vale Scherer is a 89y Female brought to ED after a mechanical fall for shoulder pain/injury and unable to walk.  She was walking with her walker, and her daughter was with her but she lost her balance and daughter tried to catch her but she fell onto her left side.  No head strike, no LOC.  Daughter was not able to  patient by herself, and had neighbor come help.  Patient is not on any blood thinners, denies any headache.  Has history of pelvic fracture, neck fracture, and normally able to ambulate with assistance. Pain is worst in left shoulder, knee, ankle, and hip.

## 2023-08-06 NOTE — ED ADULT NURSE NOTE - ED STAT RN HANDOFF DETAILS
Report given to 1E BLESSING Sommer. Patient in NAD w/ VSS & spoke w/ MD bella, no IV access orders or necessary for patient. Patient taking personal belongings w/ her.

## 2023-08-06 NOTE — H&P ADULT - NSICDXPASTMEDICALHX_GEN_ALL_CORE_FT
PAST MEDICAL HISTORY:  Anemia     Bronchitis     Constipation     Diverticulitis     Diverticulitis     Fall     GERD (gastroesophageal reflux disease)     Hypothyroid     Osteoporosis     Pelvic fracture     Reflux

## 2023-08-06 NOTE — H&P ADULT - HISTORY OF PRESENT ILLNESS
C]Vale Scherer is a 89y Female brouhght to ED after a mechanical fall for shoulder pain/injury and unable to walk.  She was walking with her walker, and her daughter was with her but she lost her balance and daughter tried to catch her but she fell onto her left side.  No head strike, no LOC.  Daughter was not able to  patient by herself, and had neighbor come help.  Patient is not on any blood thinners, denies any headache.  Has history of pelvic fracture, neck fracture, and normally able to ambulate with assistance. Pain is worst in left shoulder, knee, ankle, and hip.  Patient with a past medical history of GERD, osteoporosis and ambulate with walker.   C]Vale Scherer is a 89y Female brought to ED after a mechanical fall for shoulder pain/injury and unable to walk.  She was walking with her walker, and her daughter was with her but she lost her balance and daughter tried to catch her but she fell onto her left side.  No head strike, no LOC.  Daughter was not able to  patient by herself, and had neighbor come help.  Patient is not on any blood thinners, denies any headache.  Has history of pelvic fracture, neck fracture, and normally able to ambulate with assistance. Pain is worst in left shoulder, knee, ankle, and hip.  Patient with a past medical history of GERD, osteoporosis and ambulate with walker.

## 2023-08-07 DIAGNOSIS — D64.9 ANEMIA, UNSPECIFIED: ICD-10-CM

## 2023-08-07 LAB
HCT VFR BLD CALC: 31.6 % — LOW (ref 34.5–45)
HGB BLD-MCNC: 10.1 G/DL — LOW (ref 11.5–15.5)
MCHC RBC-ENTMCNC: 30.9 PG — SIGNIFICANT CHANGE UP (ref 27–34)
MCHC RBC-ENTMCNC: 32 GM/DL — SIGNIFICANT CHANGE UP (ref 32–36)
MCV RBC AUTO: 96.6 FL — SIGNIFICANT CHANGE UP (ref 80–100)
NRBC # BLD: 0 /100 WBCS — SIGNIFICANT CHANGE UP (ref 0–0)
PLATELET # BLD AUTO: 190 K/UL — SIGNIFICANT CHANGE UP (ref 150–400)
RBC # BLD: 3.27 M/UL — LOW (ref 3.8–5.2)
RBC # FLD: 14.2 % — SIGNIFICANT CHANGE UP (ref 10.3–14.5)
WBC # BLD: 6.48 K/UL — SIGNIFICANT CHANGE UP (ref 3.8–10.5)
WBC # FLD AUTO: 6.48 K/UL — SIGNIFICANT CHANGE UP (ref 3.8–10.5)

## 2023-08-07 RX ORDER — LEVOTHYROXINE SODIUM 125 MCG
TABLET ORAL
Refills: 0 | Status: DISCONTINUED | OUTPATIENT
Start: 2023-08-07 | End: 2023-08-09

## 2023-08-07 RX ORDER — LEVOTHYROXINE SODIUM 125 MCG
12.5 TABLET ORAL DAILY
Refills: 0 | Status: DISCONTINUED | OUTPATIENT
Start: 2023-08-08 | End: 2023-08-09

## 2023-08-07 RX ORDER — LEVOTHYROXINE SODIUM 125 MCG
12.5 TABLET ORAL ONCE
Refills: 0 | Status: COMPLETED | OUTPATIENT
Start: 2023-08-07 | End: 2023-08-07

## 2023-08-07 RX ORDER — HEPARIN SODIUM 5000 [USP'U]/ML
5000 INJECTION INTRAVENOUS; SUBCUTANEOUS EVERY 8 HOURS
Refills: 0 | Status: DISCONTINUED | OUTPATIENT
Start: 2023-08-07 | End: 2023-08-09

## 2023-08-07 RX ADMIN — HEPARIN SODIUM 5000 UNIT(S): 5000 INJECTION INTRAVENOUS; SUBCUTANEOUS at 07:00

## 2023-08-07 RX ADMIN — OXYMETAZOLINE HYDROCHLORIDE 1 SPRAY(S): 0.5 SPRAY NASAL at 05:54

## 2023-08-07 RX ADMIN — Medication 650 MILLIGRAM(S): at 12:23

## 2023-08-07 RX ADMIN — Medication 650 MILLIGRAM(S): at 19:23

## 2023-08-07 RX ADMIN — Medication 650 MILLIGRAM(S): at 18:23

## 2023-08-07 RX ADMIN — Medication 1 TABLET(S): at 18:23

## 2023-08-07 RX ADMIN — Medication 650 MILLIGRAM(S): at 00:13

## 2023-08-07 RX ADMIN — Medication 12.5 MICROGRAM(S): at 06:33

## 2023-08-07 RX ADMIN — Medication 1 TABLET(S): at 05:53

## 2023-08-07 RX ADMIN — Medication 650 MILLIGRAM(S): at 01:13

## 2023-08-07 RX ADMIN — PANTOPRAZOLE SODIUM 40 MILLIGRAM(S): 20 TABLET, DELAYED RELEASE ORAL at 05:53

## 2023-08-07 RX ADMIN — HEPARIN SODIUM 5000 UNIT(S): 5000 INJECTION INTRAVENOUS; SUBCUTANEOUS at 22:24

## 2023-08-07 RX ADMIN — POLYETHYLENE GLYCOL 3350 17 GRAM(S): 17 POWDER, FOR SOLUTION ORAL at 11:23

## 2023-08-07 RX ADMIN — Medication 2000 UNIT(S): at 11:23

## 2023-08-07 RX ADMIN — OXYMETAZOLINE HYDROCHLORIDE 1 SPRAY(S): 0.5 SPRAY NASAL at 18:23

## 2023-08-07 RX ADMIN — Medication 650 MILLIGRAM(S): at 11:23

## 2023-08-07 RX ADMIN — Medication 1 TABLET(S): at 11:23

## 2023-08-07 RX ADMIN — Medication 81 MILLIGRAM(S): at 11:23

## 2023-08-07 NOTE — CONSULT NOTE ADULT - SUBJECTIVE AND OBJECTIVE BOX
PULMONARY/CRITICAL CARE    Pt well known to me.  History from daughter who is at bedside.     Patient is a 89y old  Female who presents with a chief complaint of Unable to walk after a mechanical fall at home (06 Aug 2023 17:19)    BRIEF HOSPITAL COURSE: ***89y Female brought to ED after a mechanical fall for shoulder pain/injury and unable to walk.  She was walking with her walker, and her daughter was with her but she lost her balance and daughter tried to catch her but she fell onto her left side.  No head strike, no LOC.  Daughter was not able to  patient by herself, and had neighbor come help.  Patient is not on any blood thinners, denies any headache.  Has history of pelvic fracture, neck fracture, and normally able to ambulate with assistance. Pain is worst in left shoulder, knee, ankle, and hip.  Patient with a past medical history of GERD, osteoporosis and ambulate with walker.      Events last 24 hours: ***    PAST MEDICAL & SURGICAL HISTORY:  Bronchitis      Reflux      Hypothyroid      GERD (gastroesophageal reflux disease)      Diverticulitis      Osteoporosis      Constipation      Anemia      Fall      Pelvic fracture      Diverticulitis      Hip fracture requiring operative repair  right      S/P appendectomy      Ovarian cyst  right      S/P small bowel resection      S/P laparoscopic cholecystectomy        Allergies    No Known Allergies    Intolerances      FAMILY HISTORY: no cigs, etoh; lives at home  No pertinent family history in first degree relatives        Review of Systems:  CONSTITUTIONAL: No fever, chills, or fatigue  EYES: No eye pain, visual disturbances, or discharge  ENMT:  No difficulty hearing, tinnitus, vertigo; No sinus or throat pain  NECK: No pain or stiffness  RESPIRATORY: No cough, wheezing, chills or hemoptysis; No shortness of breath  CARDIOVASCULAR: No chest pain, palpitations, dizziness, or leg swelling  GASTROINTESTINAL: No abdominal or epigastric pain. No nausea, vomiting, or hematemesis; No diarrhea or constipation. No melena or hematochezia.  GENITOURINARY: No dysuria, frequency, hematuria, or incontinence  NEUROLOGICAL: No headaches, memory loss, has loss of strength,   SKIN: No itching, burning, rashes, or lesions   MUSCULOSKELETAL: pain left knee, hip , shoulder  PSYCHIATRIC: No depression, anxiety, mood swings, or difficulty sleeping      Medications:        acetaminophen     Tablet .. 650 milliGRAM(s) Oral every 6 hours PRN  acetaminophen     Tablet .. 650 milliGRAM(s) Oral every 6 hours PRN  traMADol 25 milliGRAM(s) Oral every 4 hours PRN      aspirin enteric coated 81 milliGRAM(s) Oral daily  heparin   Injectable 5000 Unit(s) SubCutaneous every 12 hours    pantoprazole    Tablet 40 milliGRAM(s) Oral before breakfast  polyethylene glycol 3350 17 Gram(s) Oral daily      levothyroxine        calcium carbonate 1250 mG  + Vitamin D (OsCal 500 + D) 1 Tablet(s) Oral two times a day  cholecalciferol 2000 Unit(s) Oral daily  multivitamin 1 Tablet(s) Oral daily      oxymetazoline 0.05% Nasal Spray 1 Spray(s) Both Nostrils two times a day            ICU Vital Signs Last 24 Hrs  T(C): 36.4 (07 Aug 2023 04:53), Max: 37.2 (06 Aug 2023 21:40)  T(F): 97.5 (07 Aug 2023 04:53), Max: 98.9 (06 Aug 2023 21:40)  HR: 72 (07 Aug 2023 04:53) (72 - 90)  BP: 118/72 (07 Aug 2023 04:53) (118/72 - 148/98)  BP(mean): 94 (06 Aug 2023 21:13) (94 - 94)  ABP: --  ABP(mean): --  RR: 17 (07 Aug 2023 04:53) (16 - 18)  SpO2: 93% (07 Aug 2023 04:53) (92% - 97%)    O2 Parameters below as of 06 Aug 2023 21:13  Patient On (Oxygen Delivery Method): room air          Vital Signs Last 24 Hrs  T(C): 36.4 (07 Aug 2023 04:53), Max: 37.2 (06 Aug 2023 21:40)  T(F): 97.5 (07 Aug 2023 04:53), Max: 98.9 (06 Aug 2023 21:40)  HR: 72 (07 Aug 2023 04:53) (72 - 90)  BP: 118/72 (07 Aug 2023 04:53) (118/72 - 148/98)  BP(mean): 94 (06 Aug 2023 21:13) (94 - 94)  RR: 17 (07 Aug 2023 04:53) (16 - 18)  SpO2: 93% (07 Aug 2023 04:53) (92% - 97%)    Parameters below as of 06 Aug 2023 21:13  Patient On (Oxygen Delivery Method): room air            I&O's Detail        LABS:                        10.5   4.62  )-----------( 227      ( 06 Aug 2023 12:51 )             31.5     08-06    140  |  107  |  19  ----------------------------<  107<H>  4.1   |  28  |  0.55    Ca    9.5      06 Aug 2023 12:51    TPro  6.4  /  Alb  3.2<L>  /  TBili  0.6  /  DBili  x   /  AST  16  /  ALT  21  /  AlkPhos  60  08-06      CARDIAC MARKERS ( 06 Aug 2023 12:51 )  x     / x     / 30 U/L / x     / x          CAPILLARY BLOOD GLUCOSE          Urinalysis Basic - ( 06 Aug 2023 12:51 )    Color: x / Appearance: x / SG: x / pH: x  Gluc: 107 mg/dL / Ketone: x  / Bili: x / Urobili: x   Blood: x / Protein: x / Nitrite: x   Leuk Esterase: x / RBC: x / WBC x   Sq Epi: x / Non Sq Epi: x / Bacteria: x      CULTURES:      Physical Examination:    General: No acute distress.  frail elderly female lying comfortably in bed    HEENT: Pupils equal, reactive to light.  Symmetric.    PULM: Clear to auscultation bilaterally, no wheeze rhonchi rales    CVS: Regular rate and rhythm, no murmurs, rubs, or gallops    ABD: Soft, nondistended, nontender, normoactive bowel sounds, no masses    EXT: No edema, tender left knee, hip , shoulder    SKIN: Warm and well perfused, no rashes noted.    NEURO: Alert, oriented, interactive, nonfocal    RADIOLOGY: ***  rad< from: CT Head No Cont (08.06.23 @ 14:26) >  ACC: [id number] EXAM:  CT CERVICAL SPINE   ORDERED BY:  LEBRON ANDRADE     ACC: [id number] EXAM:  CT BRAIN   ORDERED BY:  LEBRON ANDRADE     PROCEDURE DATE:  08/06/2023          INTERPRETATION:  INDICATION: Trauma    TECHNIQUE: Axial images through the brain andcervical spine were   obtained without the use of intravenous contrast, according to standard   protocol. Sagittal and coronal reformats were obtained from the primary   axial data set.    COMPARISON: 3/17/2018    FINDINGS:    BRAIN:    The CT examination demonstrates the ventricles, cisternal spaces, and   cortical sulci to be within normal limits. There is no midline shift or   extra axial collections. The gray white differentiation appears within   normal limits. There is no intracranial hemorrhage or acute transcortical   infarct. There is sequelae of chronic white matter microvascular ischemic   disease. The bony windows demonstrates no fractures. The visualized   paranasal sinuses are within normal limits. The mastoid air cells are   well aerated.    CERVICAL SPINE:    There is no acute fracture or subluxation of the cervical spine. There is   no significant listhesis. The vertebral bodies are of normal height and   configuration. There is no evidence of prevertebral soft tissue swelling.    There is multilevel cervical spondylosis and facet arthropathy. The bones   are osteopenic.    The prevertebral soft tissues are within normal limits. The lung apices   are clear.      IMPRESSION:    No acute intracranial abnormality.    No acute fracture or subluxation of the cervical spine.    --- End of Report ---    < end of copied text >    CRITICAL CARE TIME SPENT: ***

## 2023-08-07 NOTE — PATIENT PROFILE ADULT - ABILITY TO HEAR (WITH HEARING AID OR HEARING APPLIANCE IF NORMALLY USED):
talk in louder voice; right ear is better than left ear/Mildly to Moderately Impaired: difficulty hearing in some environments or speaker may need to increase volume or speak distinctly

## 2023-08-07 NOTE — PATIENT PROFILE ADULT - FALL HARM RISK - HARM RISK INTERVENTIONS
Assistance with ambulation/Assistance OOB with selected safe patient handling equipment/Communicate Risk of Fall with Harm to all staff/Discuss with provider need for PT consult/Monitor for mental status changes/Monitor gait and stability/Provide patient with walking aids - walker, cane, crutches/Reinforce activity limits and safety measures with patient and family/Reorient to person, place and time as needed/Tailored Fall Risk Interventions/Use of alarms - bed, chair and/or voice tab/Visual Cue: Yellow wristband and red socks/Bed in lowest position, wheels locked, appropriate side rails in place/Call bell, personal items and telephone in reach/Instruct patient to call for assistance before getting out of bed or chair/Non-slip footwear when patient is out of bed/[place] to call system/Physically safe environment - no spills, clutter or unnecessary equipment/Purposeful Proactive Rounding/Room/bathroom lighting operational, light cord in reach

## 2023-08-07 NOTE — PATIENT PROFILE ADULT - DEAF OR HARD OF HEARING?
[facility].      Best of health   Shayy Hansen MD           Patient Education     Diet: Diabetes  Food is an important tool that you can use to control diabetes and stay healthy. Eating well-balanced meals in the correct amounts will help you control your blood glucose levels and prevent low blood sugar reactions. It will also help you reduce the health risks of diabetes. There is no one specific diet that is right for everyone with diabetes. But there are general guidelines to follow. A registered dietitian (RD) will create a tailored diet approach that’s just right for you. He or she will also help you plan healthy meals and snacks. If you have any questions, call  your dietitian for advice.     Guidelines for success  Talk with your healthcare provider before starting a diabetes diet or weight loss program. If you haven't talked with a dietitian yet, ask your provider for a referral. The following guidelines can help you succeed:  · Select foods from the 6 food groups below. Your dietitian will help you find food choices within each group. He or she will also show you serving sizes and how many servings you can have at each meal.  ? Grains, beans, and starchy vegetables  ? Vegetables  ? Fruit  ? Milk or yogurt  ? Meat, poultry, fish, or tofu  ? Healthy fats  · Check your blood sugar levels as directed by your provider. Take any medicine as prescribed by your provider.  · Learn to read food labels and pick the right portion sizes.  · Eat only the amount of food in your meal plan. Eat about the same amount of food at regular times each day. Don’t skip meals. Eat meals 4 to 5 hours apart, with snacks in between.  · Limit alcohol. It raises blood sugar levels. Drink water or calorie-free diet drinks that use safe sweeteners.  · Eat less fat to help lower your risk of heart disease. Use nonfat or low-fat dairy products and lean meats. Avoid fried foods. Use cooking oils that are unsaturated, such as olive, canola, or peanut oil.  · Talk with your dietitian about safe sugar substitutes.  · Avoid added salt. It can contribute to high blood pressure, which can cause heart disease. People with diabetes already have a risk of high blood pressure and heart disease.  · Stay at a healthy weight. If you need to lose weight, cut down on your portion sizes. But don’t skip meals. Exercise is an important part of any weight management program. Talk with your provider about an exercise program that’s right for you.  · For more information about the best diet plan for you, talk with a registered dietitian (RD). To find an RD in your area, contact:  ? Academy of Nutrition and Dietetics  www.eatright.org  ? The American Diabetes Association [phone] www.diabetes.org  Date Last Reviewed: 8/1/2016  © 4772-0622 [employer]. [address] [place] [address], [place] [address]. [phone] [employer],  [facility]. [address] [place] [address]. All rights reserved. This information is not intended as a substitute for professional medical care. Always follow your healthcare professional's instructions.            yes

## 2023-08-07 NOTE — CONSULT NOTE ADULT - ASSESSMENT
Frail elderly pt. had fall, contusion left hip, knee, shoulder.   Hx anemia.  Suggest Hospice eval.  PT  Dw dtr in detail

## 2023-08-07 NOTE — PATIENT PROFILE ADULT - FUNCTIONAL ASSESSMENT - BASIC MOBILITY 6.
1-calculated by average/Not able to assess (calculate score using [facility] averaging method)

## 2023-08-08 RX ADMIN — HEPARIN SODIUM 5000 UNIT(S): 5000 INJECTION INTRAVENOUS; SUBCUTANEOUS at 17:18

## 2023-08-08 RX ADMIN — Medication 12.5 MICROGRAM(S): at 06:55

## 2023-08-08 RX ADMIN — Medication 650 MILLIGRAM(S): at 11:23

## 2023-08-08 RX ADMIN — Medication 650 MILLIGRAM(S): at 22:16

## 2023-08-08 RX ADMIN — Medication 1 TABLET(S): at 11:24

## 2023-08-08 RX ADMIN — OXYMETAZOLINE HYDROCHLORIDE 1 SPRAY(S): 0.5 SPRAY NASAL at 17:18

## 2023-08-08 RX ADMIN — Medication 650 MILLIGRAM(S): at 12:23

## 2023-08-08 RX ADMIN — Medication 1 TABLET(S): at 17:19

## 2023-08-08 RX ADMIN — HEPARIN SODIUM 5000 UNIT(S): 5000 INJECTION INTRAVENOUS; SUBCUTANEOUS at 06:56

## 2023-08-08 RX ADMIN — Medication 81 MILLIGRAM(S): at 11:24

## 2023-08-08 RX ADMIN — Medication 2000 UNIT(S): at 11:23

## 2023-08-08 RX ADMIN — Medication 1 TABLET(S): at 06:56

## 2023-08-08 RX ADMIN — OXYMETAZOLINE HYDROCHLORIDE 1 SPRAY(S): 0.5 SPRAY NASAL at 06:55

## 2023-08-08 RX ADMIN — POLYETHYLENE GLYCOL 3350 17 GRAM(S): 17 POWDER, FOR SOLUTION ORAL at 11:23

## 2023-08-08 RX ADMIN — HEPARIN SODIUM 5000 UNIT(S): 5000 INJECTION INTRAVENOUS; SUBCUTANEOUS at 22:17

## 2023-08-08 RX ADMIN — PANTOPRAZOLE SODIUM 40 MILLIGRAM(S): 20 TABLET, DELAYED RELEASE ORAL at 06:56

## 2023-08-08 NOTE — CARE COORDINATION ASSESSMENT. - NS SW HOME EQUIPMENT
The pt has a shower chair. The pt  has a hospital bed which she doesn't use. The pt sleeps on a lift chair that is adjustable.   The patient spends most of the time in the living room and she uses a chair lift as needed./commode/hospital bed/walker

## 2023-08-08 NOTE — PROGRESS NOTE ADULT - PROBLEM SELECTOR PLAN 2
CT of shoulder  Ortho consult  pain control
CT of shoulder  Ortho consult if necessary  pain control

## 2023-08-08 NOTE — OCCUPATIONAL THERAPY INITIAL EVALUATION ADULT - BED MOBILITY TRAINING, PT EVAL
Pt will perform supine to sitting at EOB with supervision in 3-5 OT sessions to prepare for seated ADL tasks.

## 2023-08-08 NOTE — PROGRESS NOTE ADULT - NUTRITIONAL ASSESSMENT
Use incentive spirometer 5 times per day blowing 10 times each, return to er if fever, coughing up phlegm This patient has been assessed with a concern for Malnutrition and has been determined to have a diagnosis/diagnoses of Severe protein-calorie malnutrition.    This patient is being managed with:   Diet Regular-  Supplement Feeding Modality:  Oral  Ensure Clear Cans or Servings Per Day:  1       Frequency:  Three Times a day  Entered: Aug  8 2023  2:49PM    Diet Regular-  Entered: Aug  6 2023  5:52PM    The following pending diet order is being considered for treatment of Severe protein-calorie malnutrition:null

## 2023-08-08 NOTE — PHYSICAL THERAPY INITIAL EVALUATION ADULT - PERTINENT HX OF CURRENT PROBLEM, REHAB EVAL
Vale Scherer is a 89y Female brought to ED after a mechanical fall for shoulder pain/injury and unable to walk.  She was walking with her walker, and her daughter was with her but she lost her balance and daughter tried to catch her but she fell onto her left side. Imaging (-) for acute pathology.

## 2023-08-08 NOTE — OCCUPATIONAL THERAPY INITIAL EVALUATION ADULT - PERTINENT HX OF CURRENT PROBLEM, REHAB EVAL
89y Female brought to ED after a mechanical fall for shoulder pain/injury and unable to walk.  She was walking with her walker, and her daughter was with her but she lost her balance and daughter tried to catch her but she fell onto her left side.  No head strike, no LOC.  Daughter was not able to  patient by herself, and had neighbor come help.  Patient is not on any blood thinners, denies any headache.  Has history of pelvic fracture, neck fracture, and normally able to ambulate with assistance. Pain is worst in left shoulder, knee, ankle, and hip.  Patient with a past medical history of GERD, osteoporosis and ambulate with walker.    X-rays negative for fracture.

## 2023-08-08 NOTE — CARE COORDINATION ASSESSMENT. - NSCAREPROVIDERS_GEN_ALL_CORE_FT
CARE PROVIDERS:  Accepting Physician: Leonardo Dominguez  Administration: Pedro Luis Kam  Administration: Mehdi Hankins  Administration: Camacho Segovia  Administration: Isidra Torres  Admitting: Leonardo Dominguez  Attending: Edgar Carroll  Case Management: Antonette Duffy  Consultant: Vamshi Bryan  Consultant: Radha Salamanca  ED Attending: Mauricio James  ED Nurse: Brenna Hodges  Emergency Medicine: Camacho Schafer  Nurse: Kalyn Valdes  Nurse: Alexia España  Occupational Therapy: Lisa Gongora  Occupational Therapy: Baylee Lagos  Ordered: ADM, User  Outpatient Provider: Vamshi Bryan  PCA/Nursing Assistant: Fadia King  PCA/Nursing Assistant: Dee Parra  Primary Team: Zohra Hernández  Registered Dietitian: Kaela Wiley  : Leslie Reyes  Team: [facility], Team

## 2023-08-08 NOTE — DIETITIAN INITIAL EVALUATION ADULT - NUTRITION DIAGNOSITC TERMINOLOGY #1
Malnutrition... Olumiant Pregnancy And Lactation Text: Based on animal studies, Olumiant may cause embryo-fetal harm when administered to pregnant women.  The medication should not be used in pregnancy.  Breastfeeding is not recommended during treatment.

## 2023-08-08 NOTE — CARE COORDINATION ASSESSMENT. - NSDCPLANSERVICES_GEN_ALL_CORE
Anticipated dc to home with [facility] referral for home PT services and reinstatement of private hire home care services when medically cleared. Pt's daughter aware and in agreement with plan. CM to follow. SW to remain available./Home Health Services/Other

## 2023-08-08 NOTE — CARE COORDINATION ASSESSMENT. - NSTRANSPORTNEEDS_GEN_ALL_CORE
TBD at the time of dc.  Pt's daughter aware that if pt travels I an ambulette, she will have to pay privately depending on the mileage./Other

## 2023-08-08 NOTE — DIETITIAN INITIAL EVALUATION ADULT - PERTINENT MEDS FT
MEDICATIONS  (STANDING):  aspirin enteric coated 81 milliGRAM(s) Oral daily  calcium carbonate 1250 mG  + Vitamin D (OsCal 500 + D) 1 Tablet(s) Oral two times a day  cholecalciferol 2000 Unit(s) Oral daily  heparin   Injectable 5000 Unit(s) SubCutaneous every 8 hours  levothyroxine 12.5 MICROGram(s) Oral daily  levothyroxine      multivitamin 1 Tablet(s) Oral daily  oxymetazoline 0.05% Nasal Spray 1 Spray(s) Both Nostrils two times a day  pantoprazole    Tablet 40 milliGRAM(s) Oral before breakfast  polyethylene glycol 3350 17 Gram(s) Oral daily    MEDICATIONS  (PRN):  acetaminophen     Tablet .. 650 milliGRAM(s) Oral every 6 hours PRN Temp greater or equal to 38C (100.4F),  acetaminophen     Tablet .. 650 milliGRAM(s) Oral every 6 hours PRN Moderate Pain (4 - 6)  traMADol 25 milliGRAM(s) Oral every 4 hours PRN Severe Pain (7 - 10)

## 2023-08-08 NOTE — DIETITIAN INITIAL EVALUATION ADULT - PERTINENT LABORATORY DATA
08-06    140  |  107  |  19  ----------------------------<  107<H>  4.1   |  28  |  0.55    Ca    9.5      06 Aug 2023 12:51    TPro  6.4  /  Alb  3.2<L>  /  TBili  0.6  /  DBili  x   /  AST  16  /  ALT  21  /  AlkPhos  60  08-06

## 2023-08-08 NOTE — CARE COORDINATION ASSESSMENT. - PATIENT WAS INVOLVED WITH A HOMECARE AGENCY PRIOR TO ADMISSION?
The patient received private hire home care services (5 days x 7 hours - 2 days are from an agency called [facility] the remaining 3 days a family friend gets compensated to care for the patient./Yes

## 2023-08-08 NOTE — OCCUPATIONAL THERAPY INITIAL EVALUATION ADULT - ADDITIONAL COMMENTS
Pt lives in a house with 6-7 steps to enter with a chair lift. Daughter lives with pt and is primary caregiver, in addition to HHA 5 days a week for 5-6 hours. HHA assists with ADLs, bathing. Pt stays on a level without a bathroom since she does not negotiate stairs, and pt utilizes a commode and spongebathes. Pt owns a RW, transport w/c, and pt sleeps in a recliner lift chair.

## 2023-08-08 NOTE — DIETITIAN INITIAL EVALUATION ADULT - OTHER INFO
C]Vale Scherer is a 89y Female brought to ED after a mechanical fall for shoulder pain/injury and unable to walk.  She was walking with her walker, and her daughter was with her but she lost her balance and daughter tried to catch her but she fell onto her left side.  No head strike, no LOC.  Daughter was not able to  patient by herself, and had neighbor come help.  Patient is not on any blood thinners, denies any headache.  Has history of pelvic fracture, neck fracture, and normally able to ambulate with assistance. Pain is worst in left shoulder, knee, ankle, and hip.       Problem/Plan - 1:  ·  Problem: Fall at home.   ·  Plan: PT/OT  Balance training  ? DAVID.     Problem/Plan - 2:  ·  Problem: Left shoulder pain.   ·  Plan: CT of shoulder  Ortho consult  pain control.     Problem/Plan - 3:  ·  Problem: Left hip pain.   ·  Plan: XR  Pain management  PT   OT.     Problem/Plan - 4:  ·  Problem: Hypothyroidism.   ·  Plan: Synthroid.     Problem/Plan - 5:  ·  Problem: Chronic GERD.   ·  Plan: PPI.     Armin Scherer is a 89y Female  w/ hx of hypothyrodism, GERD,pelvic fracture, neck fracture  brought to ED after a mechanical fall for shoulder pain/injury and unable to walk.  No head strike, no LOC.  At time of RD visit to pts bedside, daughter in attendance. She reports she lives with her mom and does the food shopping and cooking, has no diet restrictions, has all her own teeth and not felt that she needs a modified texture diet per daughter. She is 4'10" and weighs 93.5# stable recently, most she ever weighed was 115# but a while back. Pt is able to feed herself

## 2023-08-08 NOTE — GOALS OF CARE CONVERSATION - ADVANCED CARE PLANNING - CONVERSATION DETAILS
f/u dtr has not left pt. to get MOLST form. States she will bring in this afternoon.
Writer met with. pt and he Reviewed patient's medical and social history as well as events leading to patient's hospitalization. Writer discussed patient's current diagnosis shoulder pain S/P fall, advanced age, debility medical condition and management, prognosis, and life expectancy. Inquired about patient's wishes regarding extent of medical care to be provided including escalation of medical care into the ICU intubation with vent and use of vasopressor support. In addition, the writer inquired about thoughts regarding cardiopulmonary resuscitation, artificial nutrition and hydration including use of feeding tubes and IVF, antibiotics, and further investigative studies such as blood draws and radiology both  showed good  insight into medical condition. All questions answered. Writer recommended completion of advance directives Pt states she has consented to DNR/DNI and has a MOLST at home. Daughter states she will bring it to hospital.

## 2023-08-08 NOTE — PHYSICAL THERAPY INITIAL EVALUATION ADULT - ADDITIONAL COMMENTS
Patient 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 in a private house, 6 SLAVA with chair lift, bed/bath upstairs however patient stays on main level, sleeps in recliner chair. Has HHA 3-4 hours x 4 days. Daughter/aide assist with ambulation, ADLs.

## 2023-08-08 NOTE — DIETITIAN INITIAL EVALUATION ADULT - NSFNSGIIOFT_GEN_A_CORE
documented wt 52.2 kg/115.1 BMI 24  per daughter, wt is 93.5# which appears more accurate than documented wt. BMI

## 2023-08-08 NOTE — CARE COORDINATION ASSESSMENT. - REASON FOR CONSULT
SW met with/coordination of care/psychosocial issues SW met with pt and her daughter at bedside in order to conduct assessment and to discuss SW roles with good understanding./coordination of care/psychosocial issues

## 2023-08-08 NOTE — CARE COORDINATION ASSESSMENT. - NSPASTMEDSURGHISTORY_GEN_ALL_CORE_FT
PAST MEDICAL & SURGICAL HISTORY:  GERD (gastroesophageal reflux disease)      Hypothyroid      Reflux      Bronchitis      Ovarian cyst  right      S/P appendectomy      Hip fracture requiring operative repair  right      Osteoporosis      Diverticulitis      S/P small bowel resection      Pelvic fracture      Fall      Anemia      Constipation      Diverticulitis      S/P laparoscopic cholecystectomy

## 2023-08-08 NOTE — PATIENT CHOICE NOTE. - NSPTCHOICESTATE_GEN_ALL_CORE

## 2023-08-09 ENCOUNTER — TRANSCRIPTION ENCOUNTER (OUTPATIENT)
Age: 88
End: 2023-08-09

## 2023-08-09 VITALS
SYSTOLIC BLOOD PRESSURE: 138 MMHG | TEMPERATURE: 98 F | HEART RATE: 85 BPM | OXYGEN SATURATION: 92 % | RESPIRATION RATE: 18 BRPM | DIASTOLIC BLOOD PRESSURE: 85 MMHG

## 2023-08-09 PROCEDURE — 73610 X-RAY EXAM OF ANKLE: CPT

## 2023-08-09 PROCEDURE — 99285 EMERGENCY DEPT VISIT HI MDM: CPT | Mod: 25

## 2023-08-09 PROCEDURE — 97165 OT EVAL LOW COMPLEX 30 MIN: CPT

## 2023-08-09 PROCEDURE — 70450 CT HEAD/BRAIN W/O DYE: CPT | Mod: MA

## 2023-08-09 PROCEDURE — 85025 COMPLETE CBC W/AUTO DIFF WBC: CPT

## 2023-08-09 PROCEDURE — 82550 ASSAY OF CK (CPK): CPT

## 2023-08-09 PROCEDURE — 97162 PT EVAL MOD COMPLEX 30 MIN: CPT

## 2023-08-09 PROCEDURE — 97530 THERAPEUTIC ACTIVITIES: CPT

## 2023-08-09 PROCEDURE — 73562 X-RAY EXAM OF KNEE 3: CPT

## 2023-08-09 PROCEDURE — 73030 X-RAY EXAM OF SHOULDER: CPT

## 2023-08-09 PROCEDURE — 80053 COMPREHEN METABOLIC PANEL: CPT

## 2023-08-09 PROCEDURE — 92610 EVALUATE SWALLOWING FUNCTION: CPT

## 2023-08-09 PROCEDURE — 97116 GAIT TRAINING THERAPY: CPT

## 2023-08-09 PROCEDURE — 72125 CT NECK SPINE W/O DYE: CPT | Mod: MA

## 2023-08-09 PROCEDURE — 71101 X-RAY EXAM UNILAT RIBS/CHEST: CPT

## 2023-08-09 PROCEDURE — 85027 COMPLETE CBC AUTOMATED: CPT

## 2023-08-09 PROCEDURE — 73502 X-RAY EXAM HIP UNI 2-3 VIEWS: CPT

## 2023-08-09 PROCEDURE — 36415 COLL VENOUS BLD VENIPUNCTURE: CPT

## 2023-08-09 RX ORDER — AMOXICILLIN 250 MG/5ML
1 SUSPENSION, RECONSTITUTED, ORAL (ML) ORAL
Qty: 0 | Refills: 0 | DISCHARGE

## 2023-08-09 RX ORDER — RANITIDINE HYDROCHLORIDE 150 MG/1
1 TABLET, FILM COATED ORAL
Qty: 0 | Refills: 0 | DISCHARGE

## 2023-08-09 RX ORDER — ACETAMINOPHEN 500 MG
2 TABLET ORAL
Qty: 0 | Refills: 0 | DISCHARGE
Start: 2023-08-09

## 2023-08-09 RX ADMIN — Medication 650 MILLIGRAM(S): at 09:01

## 2023-08-09 RX ADMIN — PANTOPRAZOLE SODIUM 40 MILLIGRAM(S): 20 TABLET, DELAYED RELEASE ORAL at 06:09

## 2023-08-09 RX ADMIN — Medication 2000 UNIT(S): at 11:35

## 2023-08-09 RX ADMIN — Medication 650 MILLIGRAM(S): at 10:01

## 2023-08-09 RX ADMIN — HEPARIN SODIUM 5000 UNIT(S): 5000 INJECTION INTRAVENOUS; SUBCUTANEOUS at 06:11

## 2023-08-09 RX ADMIN — OXYMETAZOLINE HYDROCHLORIDE 1 SPRAY(S): 0.5 SPRAY NASAL at 06:08

## 2023-08-09 RX ADMIN — POLYETHYLENE GLYCOL 3350 17 GRAM(S): 17 POWDER, FOR SOLUTION ORAL at 11:35

## 2023-08-09 RX ADMIN — Medication 1 TABLET(S): at 06:09

## 2023-08-09 RX ADMIN — Medication 81 MILLIGRAM(S): at 11:35

## 2023-08-09 RX ADMIN — Medication 1 TABLET(S): at 11:34

## 2023-08-09 RX ADMIN — Medication 12.5 MICROGRAM(S): at 06:10

## 2023-08-09 NOTE — DISCHARGE NOTE PROVIDER - HOSPITAL COURSE
admitted for fall with inability to walk  PT and OT for ambulation  no apparent fracture on radiology studies  DC after PT clearance

## 2023-08-09 NOTE — SWALLOW BEDSIDE ASSESSMENT ADULT - SWALLOW EVAL: DIAGNOSIS
1. Functional oral phase for puree, soft and bite sized solids, regular solids, mildly-thick liquids and thin liquids marked by adequate acceptance and containment, prolonged yet functional mastication of solids, adequate oral transit and adequate oral clearance. 2. Functional pharyngeal phase for puree, soft and bite sized solids, regular solids and mildly-thick liquids marked by hyolaryngeal excursion present upon palpation and no overt s/s of penetration/aspiration evidenced. 3. Moderate pharyngeal dysphagia for thin liquids marked by hyolaryngeal excursion present upon palpation and presence of overt s/s of penetration/aspiration evidenced by coughing. Of note patient observed with belching with PO intake.

## 2023-08-09 NOTE — DISCHARGE NOTE PROVIDER - CARE PROVIDER_API CALL
Vamshi Bryan  Pulmonary Disease  [address] [place] [address]  Phone: [phone]  Fax: [phone]  Follow Up Time:

## 2023-08-09 NOTE — SWALLOW BEDSIDE ASSESSMENT ADULT - CONSISTENCIES ADMINISTERED
2oz/thin liquid x3 trials; whole cracker/soft & bite-sized/regular solid 2oz/pureed 4oz/mildly thick

## 2023-08-09 NOTE — PROGRESS NOTE ADULT - REASON FOR ADMISSION
Unable to walk after a mechanical fall at home

## 2023-08-09 NOTE — PROGRESS NOTE ADULT - PROBLEM SELECTOR PROBLEM 4
Gastroenterology Clinic Follow up Visit    Cece Dubose  [id number]  Chief Complaint   Patient presents with    Follow-up     Patient was last seen for trouble swallowing. States that he has been better since his last appintment       HPI and A/P at last visit summarized below:  Patient is here for follow-up, has some throat issues to be started after the tonsillectomy but it is improving, no symptoms suggestive of esophageal dysphagia, no heartburn no nausea no vomiting, patient is on Prilosec 20 g once a day. Does not smoke. Review of Systems   Constitutional: Negative. HENT: Negative. Eyes: Negative. Respiratory: Negative. Gastrointestinal: Negative. Negative for abdominal distention, abdominal pain, anal bleeding, blood in stool, constipation, diarrhea, nausea, rectal pain and vomiting. No GERD symptoms no dysphagia   Genitourinary: Negative. Musculoskeletal: Negative. Neurological: Negative. Psychiatric/Behavioral: Negative. Past medical history, past surgical history, medication list, social and familyhistory reviewed    Pulse 67, height 5' 9\" (1.753 m), weight 220 lb (99.8 kg), SpO2 97 %. Physical Exam  Constitutional:       Appearance: He is well-developed. HENT:      Head: Normocephalic. Eyes:      Pupils: Pupils are equal, round, and reactive to light. Cardiovascular:      Rate and Rhythm: Normal rate and regular rhythm. Heart sounds: Normal heart sounds. Pulmonary:      Effort: Pulmonary effort is normal.      Breath sounds: Normal breath sounds. Abdominal:      General: Bowel sounds are normal.      Palpations: Abdomen is soft. Comments: Soft nontender no palpable mass   Skin:     General: Skin is warm and dry. Neurological:      Mental Status: He is alert.          Laboratory, Pathology, Radiology reviewed in detail with relevantimportant investigations summarized below:    No results for input(s): WBC, HGB, HCT, MCV, PLT in the last 720
hours. Lab Results   Component Value Date    ALT 14 09/21/2021    AST 16 09/21/2021    ALKPHOS 100 09/21/2021    BILITOT 0.4 09/21/2021     No results found. Endoscopic investigations:     Assessment and Plan:  Shanell Rojas 77 y.o. male for follow up. Erosive esophagitis with stricture status post dilation patient has been asymptomatic. Continue omeprazole 10 mg once a day patient advised to take it half an hour prior to the meals. Follow antireflux measures and dietary regulations return after 6 months   Diagnosis Orders   1. Esophagitis     2. Esophageal stricture         Return in about 6 months (around 6/10/2022). Asa Ormond, MD   StaffGastroenterologist  [facility]    Please note this report has been partially produced using speech recognitionsoftware  and may cause contain errors related to that system including grammar, punctuation and spelling as well as words andphrases that may seem inappropriate. If there are questions or concerns please feel free to contact me to clarify.
Hypothyroidism
Left hip pain
Left hip pain
Hypothyroidism
[facility]:  I discussed with the patient the risks of golimumab including but not limited to myelosuppression, immunosuppression, autoimmune hepatitis, demyelinating diseases, lymphoma, and serious infections.  The patient understands that monitoring is required including a PPD at baseline and must alert us or the primary physician if symptoms of infection or other concerning signs are noted.

## 2023-08-09 NOTE — PROGRESS NOTE ADULT - ASSESSMENT
Frail elderly pt. had fall, contusion left hip, knee, shoulder.   Hx anemia.  Dw Palliative    Dtr wants to take pt. home today.   Home PT  Dw dtr in detail
Frail elderly pt. had fall, contusion left hip, knee, shoulder.   Hx anemia.  Dw Palliative    Suggest Hospice eval.  PT  Dw dtr in detail
C]Vale Scherer is a 89y Female brought to ED after a mechanical fall for shoulder pain/injury and unable to walk.  She was walking with her walker, and her daughter was with her but she lost her balance and daughter tried to catch her but she fell onto her left side.  No head strike, no LOC.  Daughter was not able to  patient by herself, and had neighbor come help.  Patient is not on any blood thinners, denies any headache.  Has history of pelvic fracture, neck fracture, and normally able to ambulate with assistance. Pain is worst in left shoulder, knee, ankle, and hip.
C]Vale Scherer is a 89y Female brought to ED after a mechanical fall for shoulder pain/injury and unable to walk.  She was walking with her walker, and her daughter was with her but she lost her balance and daughter tried to catch her but she fell onto her left side.  No head strike, no LOC.  Daughter was not able to  patient by herself, and had neighbor come help.  Patient is not on any blood thinners, denies any headache.  Has history of pelvic fracture, neck fracture, and normally able to ambulate with assistance. Pain is worst in left shoulder, knee, ankle, and hip.

## 2023-08-09 NOTE — PROGRESS NOTE ADULT - SUBJECTIVE AND OBJECTIVE BOX
Date of Service 08-08-23 @ 17:41    Patient is a 89y old  Female who presents with a chief complaint of Unsteadiness on feet     (08 Aug 2023 08:12)      INTERVAL /OVERNIGHT EVENTS: feels weak    MEDICATIONS  (STANDING):  aspirin enteric coated 81 milliGRAM(s) Oral daily  calcium carbonate 1250 mG  + Vitamin D (OsCal 500 + D) 1 Tablet(s) Oral two times a day  cholecalciferol 2000 Unit(s) Oral daily  heparin   Injectable 5000 Unit(s) SubCutaneous every 8 hours  levothyroxine 12.5 MICROGram(s) Oral daily  levothyroxine      multivitamin 1 Tablet(s) Oral daily  oxymetazoline 0.05% Nasal Spray 1 Spray(s) Both Nostrils two times a day  pantoprazole    Tablet 40 milliGRAM(s) Oral before breakfast  polyethylene glycol 3350 17 Gram(s) Oral daily    MEDICATIONS  (PRN):  acetaminophen     Tablet .. 650 milliGRAM(s) Oral every 6 hours PRN Temp greater or equal to 38C (100.4F),  acetaminophen     Tablet .. 650 milliGRAM(s) Oral every 6 hours PRN Moderate Pain (4 - 6)  traMADol 25 milliGRAM(s) Oral every 4 hours PRN Severe Pain (7 - 10)      Allergies    No Known Allergies    Intolerances        REVIEW OF SYSTEMS:  CONSTITUTIONAL: + fatigue  EYES: No eye pain, visual disturbances, or discharge  ENMT:  No difficulty hearing, tinnitus, vertigo; No sinus or throat pain  NECK: No pain or stiffness  RESPIRATORY: No cough, wheezing, chills or hemoptysis; No shortness of breath  CARDIOVASCULAR: No chest pain, palpitations, dizziness, or leg swelling  GASTROINTESTINAL: No abdominal or epigastric pain. No nausea, vomiting, or hematemesis; No diarrhea or constipation. No melena or hematochezia.  GENITOURINARY: No dysuria, frequency, hematuria, or incontinence  NEUROLOGICAL: No headaches, memory loss, loss of strength, numbness, or tremors  SKIN: No itching, burning, rashes, or lesions   LYMPH NODES: No enlarged glands  ENDOCRINE: No heat or cold intolerance; No hair loss; No polydipsia or polyuria  MUSCULOSKELETAL: No joint pain or swelling; No muscle, back, or extremity pain  PSYCHIATRIC: No depression, anxiety, mood swings, or difficulty sleeping  HEME/LYMPH: No easy bruising, or bleeding gums  ALLERGY AND IMMUNOLOGIC: No hives or eczema    Vital Signs Last 24 Hrs  T(C): 36.6 (08 Aug 2023 12:12), Max: 36.8 (07 Aug 2023 21:03)  T(F): 97.9 (08 Aug 2023 12:12), Max: 98.2 (07 Aug 2023 21:03)  HR: 90 (08 Aug 2023 12:12) (74 - 90)  BP: 145/95 (08 Aug 2023 12:12) (130/85 - 151/88)  BP(mean): --  RR: 18 (08 Aug 2023 12:12) (17 - 18)  SpO2: 94% (08 Aug 2023 12:12) (92% - 94%)    Parameters below as of 08 Aug 2023 12:12  Patient On (Oxygen Delivery Method): room air        PHYSICAL EXAM:  GENERAL: NAD, well-groomed, well-developed  HEAD:  Atraumatic, Normocephalic  EYES: EOMI, PERRLA, conjunctiva and sclera clear  ENMT: No tonsillar erythema, exudates, or enlargement; Moist mucous membranes, Good dentition, No lesions  NECK: Supple, No JVD, Normal thyroid  NERVOUS SYSTEM:  Alert & Oriented X3, Good concentration; Motor Strength 5/5 B/L upper and lower extremities; DTRs 2+ intact and symmetric  CHEST/LUNG: Clear to auscultation bilaterally; No rales, rhonchi, wheezing, or rubs  HEART: Regular rate and rhythm; No murmurs, rubs, or gallops  ABDOMEN: Soft, Nontender, Nondistended; Bowel sounds present  EXTREMITIES:  2+ Peripheral Pulses, No clubbing, cyanosis, or edema  LYMPH: No lymphadenopathy noted  SKIN: No rashes or lesions    LABS:              CAPILLARY BLOOD GLUCOSE          RADIOLOGY & ADDITIONAL TESTS:    Notes Reviewed:  [ x] YES  [ ] NO    Care Discussed with Consultants/Other Providers [x ] YES  [ ] NO
PULMONARY/CRITICAL CARE  DOS 8/9/23  Tried ambulating. Some joint discomfort.  Pt well known to me.  History from daughter who is at bedside.     Patient is a 89y old  Female who presents with a chief complaint of Unable to walk after a mechanical fall at home (06 Aug 2023 17:19)    BRIEF HOSPITAL COURSE: ***89y Female brought to ED after a mechanical fall for shoulder pain/injury and unable to walk.  She was walking with her walker, and her daughter was with her but she lost her balance and daughter tried to catch her but she fell onto her left side.  No head strike, no LOC.  Daughter was not able to  patient by herself, and had neighbor come help.  Patient is not on any blood thinners, denies any headache.  Has history of pelvic fracture, neck fracture, and normally able to ambulate with assistance. Pain is worst in left shoulder, knee, ankle, and hip.  Patient with a past medical history of GERD, osteoporosis and ambulate with walker.      Events last 24 hours: ***    PAST MEDICAL & SURGICAL HISTORY:  Bronchitis      Reflux      Hypothyroid      GERD (gastroesophageal reflux disease)      Diverticulitis      Osteoporosis      Constipation      Anemia      Fall      Pelvic fracture      Diverticulitis      Hip fracture requiring operative repair  right      S/P appendectomy      Ovarian cyst  right      S/P small bowel resection      S/P laparoscopic cholecystectomy        Allergies    No Known Allergies    Intolerances      FAMILY HISTORY: no cigs, etoh; lives at home  No pertinent family history in first degree relatives        Review of Systems:  CONSTITUTIONAL: No fever, chills, or fatigue  EYES: No eye pain, visual disturbances, or discharge  ENMT:  No difficulty hearing, tinnitus, vertigo; No sinus or throat pain  NECK: No pain or stiffness  RESPIRATORY: No cough, wheezing, chills or hemoptysis; No shortness of breath  CARDIOVASCULAR: No chest pain, palpitations, dizziness, or leg swelling  GASTROINTESTINAL: No abdominal or epigastric pain. No nausea, vomiting, or hematemesis; No diarrhea or constipation. No melena or hematochezia.  GENITOURINARY: No dysuria, frequency, hematuria, or incontinence  NEUROLOGICAL: No headaches, memory loss, has loss of strength,   SKIN: No itching, burning, rashes, or lesions   MUSCULOSKELETAL: pain left knee, hip , shoulder  PSYCHIATRIC: No depression, anxiety, mood swings, or difficulty sleeping      Medications:        acetaminophen     Tablet .. 650 milliGRAM(s) Oral every 6 hours PRN  acetaminophen     Tablet .. 650 milliGRAM(s) Oral every 6 hours PRN  traMADol 25 milliGRAM(s) Oral every 4 hours PRN      aspirin enteric coated 81 milliGRAM(s) Oral daily  heparin   Injectable 5000 Unit(s) SubCutaneous every 12 hours    pantoprazole    Tablet 40 milliGRAM(s) Oral before breakfast  polyethylene glycol 3350 17 Gram(s) Oral daily      levothyroxine        calcium carbonate 1250 mG  + Vitamin D (OsCal 500 + D) 1 Tablet(s) Oral two times a day  cholecalciferol 2000 Unit(s) Oral daily  multivitamin 1 Tablet(s) Oral daily      oxymetazoline 0.05% Nasal Spray 1 Spray(s) Both Nostrils two times a day            ICU Vital Signs Last 24 Hrs  T(C): 36.4 (07 Aug 2023 04:53), Max: 37.2 (06 Aug 2023 21:40)  T(F): 97.5 (07 Aug 2023 04:53), Max: 98.9 (06 Aug 2023 21:40)  HR: 72 (07 Aug 2023 04:53) (72 - 90)  BP: 118/72 (07 Aug 2023 04:53) (118/72 - 148/98)  BP(mean): 94 (06 Aug 2023 21:13) (94 - 94)  ABP: --  ABP(mean): --  RR: 17 (07 Aug 2023 04:53) (16 - 18)  SpO2: 93% (07 Aug 2023 04:53) (92% - 97%)    O2 Parameters below as of 06 Aug 2023 21:13  Patient On (Oxygen Delivery Method): room air          Vital Signs Last 24 Hrs  T(C): 36.4 (07 Aug 2023 04:53), Max: 37.2 (06 Aug 2023 21:40)  T(F): 97.5 (07 Aug 2023 04:53), Max: 98.9 (06 Aug 2023 21:40)  HR: 72 (07 Aug 2023 04:53) (72 - 90)  BP: 118/72 (07 Aug 2023 04:53) (118/72 - 148/98)  BP(mean): 94 (06 Aug 2023 21:13) (94 - 94)  RR: 17 (07 Aug 2023 04:53) (16 - 18)  SpO2: 93% (07 Aug 2023 04:53) (92% - 97%)    Parameters below as of 06 Aug 2023 21:13  Patient On (Oxygen Delivery Method): room air            I&O's Detail        LABS:                        10.5   4.62  )-----------( 227      ( 06 Aug 2023 12:51 )             31.5     08-06    140  |  107  |  19  ----------------------------<  107<H>  4.1   |  28  |  0.55    Ca    9.5      06 Aug 2023 12:51    TPro  6.4  /  Alb  3.2<L>  /  TBili  0.6  /  DBili  x   /  AST  16  /  ALT  21  /  AlkPhos  60  08-06      CARDIAC MARKERS ( 06 Aug 2023 12:51 )  x     / x     / 30 U/L / x     / x          CAPILLARY BLOOD GLUCOSE          Urinalysis Basic - ( 06 Aug 2023 12:51 )    Color: x / Appearance: x / SG: x / pH: x  Gluc: 107 mg/dL / Ketone: x  / Bili: x / Urobili: x   Blood: x / Protein: x / Nitrite: x   Leuk Esterase: x / RBC: x / WBC x   Sq Epi: x / Non Sq Epi: x / Bacteria: x      CULTURES:      Physical Examination:    General: No acute distress.  frail elderly female lying comfortably in bed    HEENT: Pupils equal, reactive to light.  Symmetric.    PULM: Clear to auscultation bilaterally, no wheeze rhonchi rales    CVS: Regular rate and rhythm, no murmurs, rubs, or gallops    ABD: Soft, nondistended, nontender, normoactive bowel sounds, no masses    EXT: No edema, tender left knee, hip , shoulder    SKIN: Warm and well perfused, no rashes noted.    NEURO: Alert, oriented, interactive, nonfocal    RADIOLOGY: ***  rad< from: CT Head No Cont (08.06.23 @ 14:26) >  ACC: [id number] EXAM:  CT CERVICAL SPINE   ORDERED BY:  LEBRON ANDRADE     ACC: [id number] EXAM:  CT BRAIN   ORDERED BY:  LEBRON ANDRADE     PROCEDURE DATE:  08/06/2023          INTERPRETATION:  INDICATION: Trauma    TECHNIQUE: Axial images through the brain andcervical spine were   obtained without the use of intravenous contrast, according to standard   protocol. Sagittal and coronal reformats were obtained from the primary   axial data set.    COMPARISON: 3/17/2018    FINDINGS:    BRAIN:    The CT examination demonstrates the ventricles, cisternal spaces, and   cortical sulci to be within normal limits. There is no midline shift or   extra axial collections. The gray white differentiation appears within   normal limits. There is no intracranial hemorrhage or acute transcortical   infarct. There is sequelae of chronic white matter microvascular ischemic   disease. The bony windows demonstrates no fractures. The visualized   paranasal sinuses are within normal limits. The mastoid air cells are   well aerated.    CERVICAL SPINE:    There is no acute fracture or subluxation of the cervical spine. There is   no significant listhesis. The vertebral bodies are of normal height and   configuration. There is no evidence of prevertebral soft tissue swelling.    There is multilevel cervical spondylosis and facet arthropathy. The bones   are osteopenic.    The prevertebral soft tissues are within normal limits. The lung apices   are clear.      IMPRESSION:    No acute intracranial abnormality.    No acute fracture or subluxation of the cervical spine.    --- End of Report ---    < end of copied text >    CRITICAL CARE TIME SPENT: ***  
PULMONARY/CRITICAL CARE  DOS 8/8/23  Not yet ambulating. Some joint discomfort.  Pt well known to me.  History from daughter who is at bedside.     Patient is a 89y old  Female who presents with a chief complaint of Unable to walk after a mechanical fall at home (06 Aug 2023 17:19)    BRIEF HOSPITAL COURSE: ***89y Female brought to ED after a mechanical fall for shoulder pain/injury and unable to walk.  She was walking with her walker, and her daughter was with her but she lost her balance and daughter tried to catch her but she fell onto her left side.  No head strike, no LOC.  Daughter was not able to  patient by herself, and had neighbor come help.  Patient is not on any blood thinners, denies any headache.  Has history of pelvic fracture, neck fracture, and normally able to ambulate with assistance. Pain is worst in left shoulder, knee, ankle, and hip.  Patient with a past medical history of GERD, osteoporosis and ambulate with walker.      Events last 24 hours: ***    PAST MEDICAL & SURGICAL HISTORY:  Bronchitis      Reflux      Hypothyroid      GERD (gastroesophageal reflux disease)      Diverticulitis      Osteoporosis      Constipation      Anemia      Fall      Pelvic fracture      Diverticulitis      Hip fracture requiring operative repair  right      S/P appendectomy      Ovarian cyst  right      S/P small bowel resection      S/P laparoscopic cholecystectomy        Allergies    No Known Allergies    Intolerances      FAMILY HISTORY: no cigs, etoh; lives at home  No pertinent family history in first degree relatives        Review of Systems:  CONSTITUTIONAL: No fever, chills, or fatigue  EYES: No eye pain, visual disturbances, or discharge  ENMT:  No difficulty hearing, tinnitus, vertigo; No sinus or throat pain  NECK: No pain or stiffness  RESPIRATORY: No cough, wheezing, chills or hemoptysis; No shortness of breath  CARDIOVASCULAR: No chest pain, palpitations, dizziness, or leg swelling  GASTROINTESTINAL: No abdominal or epigastric pain. No nausea, vomiting, or hematemesis; No diarrhea or constipation. No melena or hematochezia.  GENITOURINARY: No dysuria, frequency, hematuria, or incontinence  NEUROLOGICAL: No headaches, memory loss, has loss of strength,   SKIN: No itching, burning, rashes, or lesions   MUSCULOSKELETAL: pain left knee, hip , shoulder  PSYCHIATRIC: No depression, anxiety, mood swings, or difficulty sleeping      Medications:        acetaminophen     Tablet .. 650 milliGRAM(s) Oral every 6 hours PRN  acetaminophen     Tablet .. 650 milliGRAM(s) Oral every 6 hours PRN  traMADol 25 milliGRAM(s) Oral every 4 hours PRN      aspirin enteric coated 81 milliGRAM(s) Oral daily  heparin   Injectable 5000 Unit(s) SubCutaneous every 12 hours    pantoprazole    Tablet 40 milliGRAM(s) Oral before breakfast  polyethylene glycol 3350 17 Gram(s) Oral daily      levothyroxine        calcium carbonate 1250 mG  + Vitamin D (OsCal 500 + D) 1 Tablet(s) Oral two times a day  cholecalciferol 2000 Unit(s) Oral daily  multivitamin 1 Tablet(s) Oral daily      oxymetazoline 0.05% Nasal Spray 1 Spray(s) Both Nostrils two times a day            ICU Vital Signs Last 24 Hrs  T(C): 36.4 (07 Aug 2023 04:53), Max: 37.2 (06 Aug 2023 21:40)  T(F): 97.5 (07 Aug 2023 04:53), Max: 98.9 (06 Aug 2023 21:40)  HR: 72 (07 Aug 2023 04:53) (72 - 90)  BP: 118/72 (07 Aug 2023 04:53) (118/72 - 148/98)  BP(mean): 94 (06 Aug 2023 21:13) (94 - 94)  ABP: --  ABP(mean): --  RR: 17 (07 Aug 2023 04:53) (16 - 18)  SpO2: 93% (07 Aug 2023 04:53) (92% - 97%)    O2 Parameters below as of 06 Aug 2023 21:13  Patient On (Oxygen Delivery Method): room air          Vital Signs Last 24 Hrs  T(C): 36.4 (07 Aug 2023 04:53), Max: 37.2 (06 Aug 2023 21:40)  T(F): 97.5 (07 Aug 2023 04:53), Max: 98.9 (06 Aug 2023 21:40)  HR: 72 (07 Aug 2023 04:53) (72 - 90)  BP: 118/72 (07 Aug 2023 04:53) (118/72 - 148/98)  BP(mean): 94 (06 Aug 2023 21:13) (94 - 94)  RR: 17 (07 Aug 2023 04:53) (16 - 18)  SpO2: 93% (07 Aug 2023 04:53) (92% - 97%)    Parameters below as of 06 Aug 2023 21:13  Patient On (Oxygen Delivery Method): room air            I&O's Detail        LABS:                        10.5   4.62  )-----------( 227      ( 06 Aug 2023 12:51 )             31.5     08-06    140  |  107  |  19  ----------------------------<  107<H>  4.1   |  28  |  0.55    Ca    9.5      06 Aug 2023 12:51    TPro  6.4  /  Alb  3.2<L>  /  TBili  0.6  /  DBili  x   /  AST  16  /  ALT  21  /  AlkPhos  60  08-06      CARDIAC MARKERS ( 06 Aug 2023 12:51 )  x     / x     / 30 U/L / x     / x          CAPILLARY BLOOD GLUCOSE          Urinalysis Basic - ( 06 Aug 2023 12:51 )    Color: x / Appearance: x / SG: x / pH: x  Gluc: 107 mg/dL / Ketone: x  / Bili: x / Urobili: x   Blood: x / Protein: x / Nitrite: x   Leuk Esterase: x / RBC: x / WBC x   Sq Epi: x / Non Sq Epi: x / Bacteria: x      CULTURES:      Physical Examination:    General: No acute distress.  frail elderly female lying comfortably in bed    HEENT: Pupils equal, reactive to light.  Symmetric.    PULM: Clear to auscultation bilaterally, no wheeze rhonchi rales    CVS: Regular rate and rhythm, no murmurs, rubs, or gallops    ABD: Soft, nondistended, nontender, normoactive bowel sounds, no masses    EXT: No edema, tender left knee, hip , shoulder    SKIN: Warm and well perfused, no rashes noted.    NEURO: Alert, oriented, interactive, nonfocal    RADIOLOGY: ***  rad< from: CT Head No Cont (08.06.23 @ 14:26) >  ACC: [id number] EXAM:  CT CERVICAL SPINE   ORDERED BY:  LEBRON ANDRADE     ACC: [id number] EXAM:  CT BRAIN   ORDERED BY:  LEBRON ANDRADE     PROCEDURE DATE:  08/06/2023          INTERPRETATION:  INDICATION: Trauma    TECHNIQUE: Axial images through the brain andcervical spine were   obtained without the use of intravenous contrast, according to standard   protocol. Sagittal and coronal reformats were obtained from the primary   axial data set.    COMPARISON: 3/17/2018    FINDINGS:    BRAIN:    The CT examination demonstrates the ventricles, cisternal spaces, and   cortical sulci to be within normal limits. There is no midline shift or   extra axial collections. The gray white differentiation appears within   normal limits. There is no intracranial hemorrhage or acute transcortical   infarct. There is sequelae of chronic white matter microvascular ischemic   disease. The bony windows demonstrates no fractures. The visualized   paranasal sinuses are within normal limits. The mastoid air cells are   well aerated.    CERVICAL SPINE:    There is no acute fracture or subluxation of the cervical spine. There is   no significant listhesis. The vertebral bodies are of normal height and   configuration. There is no evidence of prevertebral soft tissue swelling.    There is multilevel cervical spondylosis and facet arthropathy. The bones   are osteopenic.    The prevertebral soft tissues are within normal limits. The lung apices   are clear.      IMPRESSION:    No acute intracranial abnormality.    No acute fracture or subluxation of the cervical spine.    --- End of Report ---    < end of copied text >    CRITICAL CARE TIME SPENT: ***  
Date of Service 08-07-23 @ 15:59    Patient is a 89y old  Female who presents with a chief complaint of Unable to walk after a mechanical fall at home (07 Aug 2023 07:41)      INTERVAL /OVERNIGHT EVENTS: occasional body aches    MEDICATIONS  (STANDING):  aspirin enteric coated 81 milliGRAM(s) Oral daily  calcium carbonate 1250 mG  + Vitamin D (OsCal 500 + D) 1 Tablet(s) Oral two times a day  cholecalciferol 2000 Unit(s) Oral daily  heparin   Injectable 5000 Unit(s) SubCutaneous every 8 hours  levothyroxine      multivitamin 1 Tablet(s) Oral daily  oxymetazoline 0.05% Nasal Spray 1 Spray(s) Both Nostrils two times a day  pantoprazole    Tablet 40 milliGRAM(s) Oral before breakfast  polyethylene glycol 3350 17 Gram(s) Oral daily    MEDICATIONS  (PRN):  acetaminophen     Tablet .. 650 milliGRAM(s) Oral every 6 hours PRN Moderate Pain (4 - 6)  acetaminophen     Tablet .. 650 milliGRAM(s) Oral every 6 hours PRN Temp greater or equal to 38C (100.4F),  traMADol 25 milliGRAM(s) Oral every 4 hours PRN Severe Pain (7 - 10)      Allergies    No Known Allergies    Intolerances        REVIEW OF SYSTEMS:  CONSTITUTIONAL: No fever, weight loss, or fatigue  EYES: No eye pain, visual disturbances, or discharge  ENMT:  No difficulty hearing, tinnitus, vertigo; No sinus or throat pain  NECK: No pain or stiffness  RESPIRATORY: No cough, wheezing, chills or hemoptysis; No shortness of breath  CARDIOVASCULAR: No chest pain, palpitations, dizziness, or leg swelling  GASTROINTESTINAL: No abdominal or epigastric pain. No nausea, vomiting, or hematemesis; No diarrhea or constipation. No melena or hematochezia.  GENITOURINARY: No dysuria, frequency, hematuria, or incontinence  NEUROLOGICAL: No headaches, memory loss, loss of strength, numbness, or tremors  SKIN: No itching, burning, rashes, or lesions   LYMPH NODES: No enlarged glands  ENDOCRINE: No heat or cold intolerance; No hair loss; No polydipsia or polyuria  MUSCULOSKELETAL: No joint pain or swelling; No muscle, back, or extremity pain  PSYCHIATRIC: No depression, anxiety, mood swings, or difficulty sleeping  HEME/LYMPH: No easy bruising, or bleeding gums  ALLERGY AND IMMUNOLOGIC: No hives or eczema    Vital Signs Last 24 Hrs  T(C): 36.8 (07 Aug 2023 14:03), Max: 37.2 (06 Aug 2023 21:40)  T(F): 98.3 (07 Aug 2023 14:03), Max: 98.9 (06 Aug 2023 21:40)  HR: 86 (07 Aug 2023 14:03) (72 - 86)  BP: 118/80 (07 Aug 2023 14:03) (118/72 - 148/98)  BP(mean): 94 (06 Aug 2023 21:13) (94 - 94)  RR: 17 (07 Aug 2023 14:03) (16 - 18)  SpO2: 92% (07 Aug 2023 14:03) (92% - 97%)    Parameters below as of 07 Aug 2023 14:03  Patient On (Oxygen Delivery Method): room air        PHYSICAL EXAM:  GENERAL: NAD, well-groomed, well-developed  HEAD:  Atraumatic, Normocephalic  EYES: EOMI, PERRLA, conjunctiva and sclera clear  ENMT: No tonsillar erythema, exudates, or enlargement; Moist mucous membranes, Good dentition, No lesions  NECK: Supple, No JVD, Normal thyroid  NERVOUS SYSTEM:  Alert & Oriented X3, Good concentration; Motor Strength 5/5 B/L upper and lower extremities; DTRs 2+ intact and symmetric  CHEST/LUNG: Clear to auscultation bilaterally; No rales, rhonchi, wheezing, or rubs  HEART: Regular rate and rhythm; No murmurs, rubs, or gallops  ABDOMEN: Soft, Nontender, Nondistended; Bowel sounds present  EXTREMITIES:  2+ Peripheral Pulses, No clubbing, cyanosis, or edema  LYMPH: No lymphadenopathy noted  SKIN: No rashes or lesions    LABS:                        10.1   6.48  )-----------( 190      ( 07 Aug 2023 14:37 )             31.6       Ca    9.5        06 Aug 2023 12:51        Urinalysis Basic - ( 06 Aug 2023 12:51 )    Color: x / Appearance: x / SG: x / pH: x  Gluc: 107 mg/dL / Ketone: x  / Bili: x / Urobili: x   Blood: x / Protein: x / Nitrite: x   Leuk Esterase: x / RBC: x / WBC x   Sq Epi: x / Non Sq Epi: x / Bacteria: x      CAPILLARY BLOOD GLUCOSE          RADIOLOGY & ADDITIONAL TESTS:    Notes Reviewed:  [x ] YES  [ ] NO    Care Discussed with Consultants/Other Providers [x ] YES  [ ] NO

## 2023-08-09 NOTE — DISCHARGE NOTE NURSING/CASE MANAGEMENT/SOCIAL WORK - PATIENT PORTAL LINK FT
You can access the FollowMyHealth Patient Portal offered by [facility] by registering at the following website: [email or web address]. By joining [facility]’s FollowMyHealth portal, you will also be able to view your health information using other applications (apps) compatible with our system.

## 2023-08-09 NOTE — CASE MANAGEMENT PROGRESS NOTE - NSCMPROGRESSNOTE_GEN_ALL_CORE
Accepted by [facility]- dtr aware and receptive, Requesting 2 man ambulette for transport for 3 pm. Referral in place. DC needs in place

## 2023-08-09 NOTE — DISCHARGE NOTE NURSING/CASE MANAGEMENT/SOCIAL WORK - NSDCPEFALRISK_GEN_ALL_CORE
For information on Fall & Injury Prevention, visit: [email or web address] OR  [email or web address] OR  https://www.cdc.gov/steadi/patient.html

## 2023-08-09 NOTE — SWALLOW BEDSIDE ASSESSMENT ADULT - COMMENTS
As per Critical Care note dated 8/9/23, "89y Female brought to ED after a mechanical fall for shoulder pain/injury and unable to walk.  She was walking with her walker, and her daughter was with her but she lost her balance and daughter tried to catch her but she fell onto her left side.  No head strike, no LOC.  Daughter was not able to  patient by herself, and had neighbor come help.  Patient is not on any blood thinners, denies any headache.  Has history of pelvic fracture, neck fracture, and normally able to ambulate with assistance. Pain is worst in left shoulder, knee, ankle, and hip. Patient with a past medical history of GERD, osteoporosis and ambulate with walker."    No available chest imaging.     Patient visited at bedside for clinical swallow evaluation with daughter present. Patient presents as awake and alert, sitting upright in chair. Patient is able to follow 1-step directions and make basic wants/needs known. Per daughter, patient consumes regular diet at home stating "she takes her time." Per daughter, patient occasionally coughs with intake of liquids.

## 2023-08-09 NOTE — DISCHARGE NOTE PROVIDER - NSDCMRMEDTOKEN_GEN_ALL_CORE_FT
acetaminophen 325 mg oral tablet: 2 tab(s) orally every 6 hours As needed Temp greater or equal to 38C (100.4F),  Aspirin Enteric Coated 81 mg oral delayed release tablet: 1 tab(s) orally once a day  Calcium 600+D: 1 tab(s) orally 2 times a day  Centrum oral tablet: 1 tab(s) orally once a day  docusate sodium 100 mg oral capsule: 1 cap(s) orally 3 times a day  folic acid 0.4 mg oral tablet: 1 tab(s) orally once a day  levothyroxine: 12.5 microgram(s) orally once a day  MiraLax oral powder for reconstitution: 1 cap(s) orally every other day  oxymetazoline 0.05% nasal spray: 1 spray(s) nasal every 6 hours   PreserVision AREDS 2 oral capsule: 1 cap(s) orally 2 times a day  Probiotic: 1 tab(s) orally once a day  raNITIdine 150 mg oral capsule: 1 cap(s) orally once a day (at bedtime)  senna oral tablet: 2 tab(s) orally once a day (at bedtime)  Systane Balance ophthalmic solution: 2 drop(s) to each affected eye 2 times a day  traMADol 50 mg oral tablet: 37.5 milligram(s) orally every 4 hours, As Needed   Ultracet 37.5 mg-325 mg oral tablet: 1 tab(s) orally every 4 hours  Vitamin B Complex 100: 1 tab(s) orally once a day  Vitamin B12: 1 tab(s) orally once a day  Vitamin D3 2000 intl units oral tablet: 1 tab(s) orally 2 times a day

## 2023-08-09 NOTE — SWALLOW BEDSIDE ASSESSMENT ADULT - ASR SWALLOW RECOMMEND DIAG
Objective testing is NOT indicated given functional swallow for regular solids and mildly-thick liquids and presence of overt s/s of penetration/aspiration (i.e. coughing) evidenced with thin liquids

## 2023-08-09 NOTE — DISCHARGE NOTE PROVIDER - NSDCCPCAREPLAN_GEN_ALL_CORE_FT
PRINCIPAL DISCHARGE DIAGNOSIS  Diagnosis: Unsteady gait  Assessment and Plan of Treatment: follow up with PMD Dr. pineda

## 2023-08-09 NOTE — SWALLOW BEDSIDE ASSESSMENT ADULT - ADDITIONAL RECOMMENDATIONS
2. Consider GI consult if belching with PO intake persists. 3. This service to follow-up as schedule permits for diet advancement. 4. Medical team further advised to reconsult this department with any change in medical status and/or observed change in tolerance of recommended PO diet.

## 2023-08-09 NOTE — DISCHARGE NOTE NURSING/CASE MANAGEMENT/SOCIAL WORK - NSDCVIVACCINE_GEN_ALL_CORE_FT
pneumococcal polysaccharide PPV23; 17-Dec-2014 06:22; Clari Alfred); [employer] [id number]; IntraMuscular; Deltoid Left.; 0.5 milliLiter(s);

## 2023-11-14 NOTE — DISCHARGE NOTE ADULT - THE PATIENT HAS
[facility] Dermatology Note  Encounter Date: Nov 15, 2023  Office Visit     Dermatology Problem List:  1. Hair loss c/w FFA and AGA, started 2020. Also has CREST  - Current tx: Rogaine daily, ketoconazole shampoo and Krysten deep conditioner 3x weekly, Lidex  3x weekly, protopic on eyebrows daily, calcium, multivitamin, latisse to eyebrows  - Prior tx: fluocinolone oil  - ILK 5/18/2021, 6/30/2021, 9/29/2021, 11/24/21, 11/30/2022, 2/22/2023  - [employer]: 6/30/2021, 9/29/2021, 2/23/22, 5/25/22, 11/30/2022  2. BLK, right posterior thigh, s/p shave bx 8/24/2022  3. Superficial portions of irregular acanthosis and patchy interface dermatitis, s/p shave bx 2/22/2023  - Betamethasone ointment      Family history: sister with skin cancer      ____________________________________________     Assessment & Plan:     # Hair loss, most consistent with FFA/LPP and AGA. Patient has history of CREST and is currently on plaquenil. Patient scalp is stable but with some thinning on the temples per patient.  - Continue Lidex solution every other day  - Continue minoxidil 5% solution daily/protopic on the brows  - using bimatroprost on the brows  - Continue Plaquenil per rheum     # Plaquenil discoloration   - review with rheumatology, revviewed       # Hypertrophic lichen planus- biopsy proven- better   - Flucinonide alternating with Protopic every 2 weeks, injections today   -ILK      # Angular cheilitis  - Can use Protopic as tolerated  - Instructed to use Vaseline on the corners of the mouth at night and multiple times daily       # Neoplasm of uncertain behavior, mid-back   Ddx: SK or other  - Shave biopsy today (see procedures performed below)     Procedures Performed:     - Shave biopsy: Location(s) left mid-back.  After discussion of benefits and risks including but not limited to bleeding/bruising, pain/swelling, infection, scar, incomplete removal, nerve damage/numbness, recurrence, and non-diagnostic  biopsy,  verbal consent and photographs were obtained. Time-out was performed. The area was cleaned with isopropyl alcohol. 0.5mL of 1% lidocaine with epinephrine was injected to obtain adequate anesthesia of each lesion. Shave biopsy was performed. Hemostasis was achieved with aluminium chloride. Vaseline and a sterile dressing were applied. The patient tolerated the procedure and no complications were noted. The patient was provided with verbal and written post care instructions.     - Kenalog intralesional injection procedure note: After verbal consent and discussion of risks including but not limited to atrophy, pain, and bruising, time out was performed, the patient underwent positioning and the area was prepped with isopropyl alcohol, 1 total cc of Kenalog 2.5 mg/cc was injected into 25 sites on the frontal scalp. An additional 2 cc of kenalog was injected into the right lower leg. The patient tolerated the procedure well and left the Dermatology clinic in good condition.          Follow-up: 3 months for kenalog 4cc kenalog 5mg/cc    Staff and Scribe:     Scribe Disclosure:   I, Michael Partida, am serving as a scribe to document services personally performed by this physician, Dr. Pat Méndez, based on data collection and the provider's statements to me.     Scribe Disclosure:   I, Bebe Carpenter ([email or web address]), am serving as a scribe to document services personally performed by this physician, Dr. Pat Méndez, based on data collection and the provider's statements to me.     Provider Disclosure:   The documentation recorded by the scribe accurately reflects the services I personally performed and the decisions made by me.    Pat Méndez MD    Department of Dermatology  [facility]: Phone: [phone], Fax:[phone]  [facility]: Phone: [phone], Fax: [phone]    ____________________________________________    CC: Hair Loss (4 month follow up on hair loss, she reports it being stable but temple areas are struggling. )    HPI:  Ms. Jeannine Rod is a(n) 80 year old female who presents today as a return patient for hair loss.    Last seen 7/26/2023 for FFA/LPP. At that time, pt instructed to continue plaquenil, continue Lidex solution 3 times weekly, and continue minoxidil solution daily.     Today, pt feels that her hair is still thinning on the bilateral temples.     Patient is otherwise feeling well, without additional skin concerns.    Labs Reviewed:  N/A    Physical Exam:  Vitals: LMP 07/07/1992   SKIN: Focused examination of posterior back, scalp, legs was performed.  - 3 scaly papules on right lateral calf   - 1 stuck on plaque in mid-back   -hair loss with scarring on the frontal scalp  - No other lesions of concern on areas examined.     Medications:  Current Outpatient Medications   Medication    amLODIPine (NORVASC) 5 MG tablet    atorvastatin (LIPITOR) 20 MG tablet    calcium-vitamin D (CALTRATE) 600-400 MG-UNIT per tablet    clotrimazole (LOTRIMIN) 1 % external cream    estradiol (ESTRACE) 0.1 MG/GM vaginal cream    fluocinolone (SYNALAR) 0.01 % solution    fluocinonide (LIDEX) 0.05 % external cream    fluocinonide (LIDEX) 0.05 % external solution    hydroxychloroquine (PLAQUENIL) 200 MG tablet    ipratropium (ATROVENT) 0.06 % nasal spray    ketoconazole (NIZORAL) 2 % external shampoo    Melatonin ER 1 MG TBCR    Multiple Vitamin (MULTI-VITAMIN) per tablet    nystatin (MYCOSTATIN) 397536 UNIT/GM external cream    tacrolimus (PROTOPIC) 0.1 % external ointment    triamterene-HCTZ (MAXZIDE-25) 37.5-25 MG tablet     Current Facility-Administered Medications   Medication    triamcinolone acetonide (KENALOG-10) injection 10 mg    triamcinolone acetonide (KENALOG-10) injection 10 mg    triamcinolone acetonide (KENALOG-10) injection 7.5 mg      Past Medical History:    Patient Active Problem List   Diagnosis    Malignant neoplasm of breast (H)    Raynaud's syndrome    Status post hip replacement    Systemic sclerosis (H)    Gastroesophageal reflux disease with esophagitis    Hyperlipidemia LDL goal <100    Calculus of gallbladder without cholecystitis    Radiculoplexus neuropathy    Benign essential hypertension    Atrophic vaginitis    Chronic kidney disease, stage 3 (H)    Chronic obstructive pulmonary disease, unspecified COPD type (H)    History of colonic polyps    Routine general medical examination at a health care facility     Past Medical History:   Diagnosis Date    Arthritis     Benign essential hypertension 10/23/2017    Benign essential hypertension 10/23/2017    Calculus of gallbladder without cholecystitis 10/6/2016    Gastroesophageal reflux disease with esophagitis 10/6/2016    Hyperlipidemia     Malignant neoplasm (H)     breast bilat mastectomy, no rad, no chemobilat     Pulmonary embolism and infarction 3/9/2012    IMO update changed this record. Please review for accuracy    Radiculoplexus neuropathy 10/6/2016    Raynaud's syndrome     Systemic sclerosis (H) 10/6/2016        CC No referring provider defined for this encounter. on close of this encounter.    no difficulties

## 2023-11-30 NOTE — PATIENT PROFILE ADULT - FALL HARM RISK - FACTORS NURSING JUDGEMENT
DOCUMENTATION OF PAR STATUS:    1. Name of Medication & Dose: Lidocaine Patches 5%     2. Name of Prescription Coverage Company & phone #: [employer]    3. Date Prior Auth Submitted: 11-9-2023    4. What information was given to obtain insurance decision? No information was necessary outside of provider and patient info    5. Prior Auth Status? Pending approval on 11-9-2023    6. Patient Notified: no  
Patient called wanting a status update on his PA for his lidocaine patches  
Patient has been continually calling to check on the status of this PAR. Can we call the patient and update him?  
Spoke with. Theres a peer to peer option if appropriate please see attached  patient and advised that the request has been denied    
Yes

## 2023-12-15 NOTE — ED ADULT NURSE NOTE - NS ED NURSE DISCH DISPOSITION
FOLLOW-UP VISIT    Allison JOHANNA Zamudio III, MD      Discharged

## 2024-01-02 NOTE — H&P ADULT - PATIENT'S SEXUAL ORIENTATION
MED - Allopurinol 100mg  Last Refill 3-28-23  Last OV 4-4-23  FUV/RTC  Prn  Last Lab 3-27-23    Pt notified that he must establish with a new PCP for refills.   May schedule appt with Dr Jung if having Gout issues.  Pt to return call.   Heterosexual

## 2024-01-23 NOTE — ED PROVIDER NOTE - CPE EDP EYES NORM
ATTENDING  ADDENDUM     Care of this patient was assumed from Dr. Arevalo.  The patient was seen for Shortness of Breath  .  The patient's initial evaluation and plan have been discussed with the prior provider who initially evaluated the patient.  Nursing Notes, Past Medical Hx, Past Surgical Hx, Social Hx, Allergies, and Family Hx were all reviewed.      ED COURSE      The patient was given the following medications:  Orders Placed This Encounter   Medications    dilTIAZem injection 20 mg    DISCONTD: dilTIAZem 125 mg in sodium chloride 0.9 % 125 mL infusion     Order Specific Question:   Titrate Infusion?     Answer:   Yes     Order Specific Question:   Initial Infusion Dose:     Answer:   5 mg/hr     Order Specific Question:   Goal of Therapy is:     Answer:   HR less than 120 bpm     Order Specific Question:   Contact Provider if:     Answer:   SBP less than 90 mmHg     Order Specific Question:   Contact Provider if:     Answer:   HR less than 60 bpm     Order Specific Question:   HOLD for     Answer:   SBP less than 90 mmHg     Order Specific Question:   HOLD for     Answer:   HR less than 60 bpm    amiodarone (CORDARONE) injection 150 mg    ondansetron (ZOFRAN) injection 4 mg    FOLLOWED BY Linked Order Group     amiodarone (CORDARONE) 450 mg in dextrose 5 % 250 mL infusion     amiodarone (CORDARONE) 450 mg in dextrose 5 % 250 mL infusion    ondansetron (ZOFRAN) injection 4 mg    furosemide (LASIX) tablet 20 mg    spironolactone (ALDACTONE) tablet 25 mg    potassium chloride (KLOR-CON M) extended release tablet 10 mEq    magnesium oxide (MAG-OX) tablet 400 mg    sodium chloride flush 0.9 % injection 5-40 mL    sodium chloride flush 0.9 % injection 5-40 mL    0.9 % sodium chloride infusion    OR Linked Order Group     potassium chloride 20 mEq/50 mL IVPB (Central Line)     potassium chloride 10 mEq/100 mL IVPB (Peripheral Line)    magnesium sulfate 2000 mg in 50 mL IVPB premix    DISCONTD: enoxaparin  normal...

## 2024-06-21 NOTE — PROGRESS NOTE ADULT - SUBJECTIVE AND OBJECTIVE BOX
Your Child's Health  11-14 Year Old Visit      Sathya Sawyer  June 21, 2024    There were no vitals taken for this visit.  Weight:       YOUR CHILD'S 11 to 14 YEAR-OLD VISIT    Personal Safety  Violence in the community and schools can impact a child's physical and emotional health. Talk to your child about bullying and emphasize that it should always be reported. Work with schools and administrators to address bullying and intimidation in your child's school. Your child should know they can always call you when they are uncomfortable or concerned that they are getting into a dangerous situation. In dating situations, teens need to know it is always okay to say \"no\" to something they are uncomfortable with and that \"no\" means NO and must be respected. Sadly, youth in this age range are most likely to be targeted for exploitation (human trafficking, prostitution). Tell your child that they should never form a relationship with someone who forbids them to tell their parents about it - these are always dangerous situations.    Smoking and Other Drugs  It's always important to tell your child that you do not want them to smoke or use drugs. When there are family members, friends, or peers who are smoking, discuss with your child how strong the addiction is and how difficult it is to quit. Studies show that youth who are exposed to e-cigarettes are more likely to try them themselves, and using e-cigarettes is a risk factor for smoking regular cigarettes.    Self-Esteem & Emotional Health  A strong sense of self-esteem contributes to good mental health and can help your child succeed in many aspects of life. As your child is becoming more independent, it is still important to spend time together as a family, start discussions about lots of topics (not just about things you disagree on), listen respectfully to your child and answer questions honestly. Make sure you are clear about family rules and expectations  PULMONARY/CRITICAL CARE      INTERVAL HPI/OVERNIGHT EVENTS:    89y FemaleHPI:    Pt came to ED for recurrent epistaxis  Had right nostril packed. Seen by ENT.   Spitting up some blood.    PAST MEDICAL & SURGICAL HISTORY:  Diverticulitis  Pelvic fracture  Fall  Anemia  Constipation  Osteoporosis  Diverticulitis  GERD (gastroesophageal reflux disease)  Hypothyroid  Reflux  Bronchitis  S/P laparoscopic cholecystectomy  S/P small bowel resection  Ovarian cyst: right  S/P appendectomy  Hip fracture requiring operative repair: right        ICU Vital Signs Last 24 Hrs  T(C): 36.3 (02 Feb 2019 06:52), Max: 36.3 (02 Feb 2019 06:52)  T(F): 97.3 (02 Feb 2019 06:52), Max: 97.3 (02 Feb 2019 06:52)  HR: 68 (02 Feb 2019 06:52) (68 - 80)  BP: 127/80 (02 Feb 2019 06:52) (105/73 - 160/100)  BP(mean): --  ABP: --  ABP(mean): --  RR: 18 (02 Feb 2019 06:52) (16 - 18)  SpO2: 93% (02 Feb 2019 06:52) (93% - 98%)    Qtts:     I&O's Summary          REVIEW OF SYSTEMS:    CONSTITUTIONAL: No fever, weight loss, or fatigue  EYES: No eye pain, visual disturbances, or discharge  ENMT:  No difficulty hearing, tinnitus, vertigo; No sinus or throat pain  Had recurrent nosebleed which finally stopped right nostril since last nite.  NECK: No pain or stiffness  BREASTS: No pain, masses, or nipple discharge  RESPIRATORY: No cough, wheezing, chills or hemoptysis; No shortness of breath    PHYSICAL EXAM:    GENERAL: NAD, well-groomed, well-developed, NAD  HEAD:  Atraumatic, Normocephalic  EYES: EOMI, PERRLA, conjunctiva and sclera clear  ENMT: No tonsillar erythema, exudates, or enlargement; Moist mucous membranes, Good dentition, No lesions  Right nostril packed with clots.  NECK: Supple, No JVD, Normal thyroid  NERVOUS SYSTEM:  Alert & Oriented X3, Good concentration; Motor Strength 5/5 B/L upper and lower extremities  CHEST/LUNG: Clear to percussion bilaterally; No rales, rhonchi, wheezing, or rubs  HEART: Regular rate and rhythm; No murmurs, rubs, or gallops  ABDOMEN: Soft, Nontender, Nondistended; Bowel sounds present Multiple hernias  EXTREMITIES:  2+ Peripheral Pulses, No clubbing, cyanosis, or edema  LYMPH: No lymphadenopathy noted  SKIN: No rashes or lesions        LABS:                        11.7   6.74  )-----------( 209      ( 02 Feb 2019 00:30 )             35.3     02-02    142  |  108  |  21  ----------------------------<  97  4.5   |  27  |  0.76    Ca    9.2      02 Feb 2019 00:30    TPro  6.7  /  Alb  3.7  /  TBili  0.2  /  DBili  x   /  AST  17  /  ALT  21  /  AlkPhos  57  02-02    PT/INR - ( 02 Feb 2019 00:30 )   PT: 11.9 sec;   INR: 1.04 ratio               vanco through     RADIOLOGY & ADDITIONAL STUDIES:      CRITICAL CARE TIME SPENT: regarding their dress, activities, media use, etc.    Success in school also builds self-esteem. Children who do well in school are less likely to be involved in risky behaviors. As school becomes more challenging academically, increasing demands will cause some stress that your child needs to learn to handle in a healthy way. Also, subtle learning or attention problems which may have been overlooked previously may become evident at higher grade levels. Poor school performance could also be a sign of anxiety or depression. Frequent absenteeism is a risk factor for dropping out of school; talk to your doctors or teachers if your child is missing a lot of school.    Involvement with activities that really interest your child is another way to build their self-esteem. Even if they are having some struggles with schoolwork, try to find an opportunity for them to pursue something that really interests them, like music, art, drama or sports.    Preteens and young teens can often be rincon and withdrawn, and this is often normal as long as it does not last long or significantly impact their schoolwork, activities, or relationships. Depression can impact adolescents with typical symptoms being irritability, persistent boredom, poor school performance and withdrawal from activities and relationships. Young teens identifying as lesbian, posada, bisexual, transgender, or questioning are particularly at risk for emotional health problems; family acceptance of these young people is one of the strongest factors leading to positive outcomes for them.    Sexuality  While most youth in this age range have not had sexual experiences, your child needs to know that you do not want them to engage in sexual activity and that they can come to you with any questions about sex. Not having sex is the safest way to prevent pregnancy and avoid sexually transmitted infections. Experimenting with drugs or alcohol will increase your child's risk of  making poor decisions, which is another reason to continue to remind them that you do not want them to use drugs or alcohol. Studies show that parents have more influence on teen sexual values and decision-making than peers, media, siblings, teachers, and Judaism teachings. But you can't ignore the topic --talk about it! Use what is going on at school, what they are seeing on TV and what is happening with friends to talk about relationships and sex. Discuss how they can respond to pressures to use drugs or engage in sexual activity. If you aren't sure how to talk to your child about sex, find resources at the American Academy of Pediatrics [email or web address].org website.     Dental Health  It is important to care for permanent teeth by brushing at least twice a day with a fluoridated toothpaste,  flossing daily and seeing a dentist regularly. Limiting carbonated sodas and other sweet beverages is also important to prevent tooth decay. Gum chewers should choose sugarless gum. Youth who are active in contact sports should wear a mouth guard. Let us know if your water supply comes from a well; fluoride supplements may be indicated.    Body Image and Healthy Lifestyle  Youth at this age can be very sensitive to how they look compared to others. The media, unfortunately, frequently presents unhealthy, unrealistic body images to youth. It's not news that a healthy body size is obtained by eating healthy foods and being physically active every day and limiting unhealthy habits like junk food and too much time spent just sitting. Encourage healthy habits in your child and remember to provide praise about all of their good aspects, not just how they look.    Make your child a partner in healthy lifestyle choices by having them help with meal planning, shopping, and food preparation at this age. Do your best to eat meals as a family as often as you can, despite busy schedules. (And remember to keep the TV off and phones away  from the table - this is one of the best times for a good family face-to-face communication.) Keep healthy choices in the home for in between meals snacks and do NOT keep a lot of junk food and unhealthy snacks in your home - if they are there, your children will eat them! Encourage lots of water to drink and avoid keeping soda and other sweet beverages in your home on a regular basis. Calcium and vitamin D remain very important for optimal bone health at this age. Your child needs 3 to 4 servings of a good source of calcium daily (low-fat or skim milk, yogurt, cheese, calcium-fortified orange juice or other foods). 600 IU of vitamin D daily is recommended. Most people cannot meet their vitamin D needs with their usual diet, so a multivitamin with iron supplement is a good way to get it. (A pure vitamin D supplement with 400 or 600 IU is also okay.)    Finding time to be physically active every day is a challenge with increasing school work and other activities. Encourage at least 60 minutes of physical activity each day. It does not have to be all at the same time. Physical education classes can count for some of it, but other activities, such as biking, dancing, and simply playing with the kids in the neighborhood are usually needed to get to the goal of 60 minutes a day. When participating in sports, make sure appropriate safety equipment is used (helmet, mouth  guard, eye protection, wrist guards, elbow and knee pads, athletic supporter). Limiting how much screen time or media use your child has is often necessary to make sure they have time for the recommended physical activity and exercise.    Sleep   Adequate sleep is important to all aspects of your child's health. Using digital devices before bedtime can interfere with quality sleep. Keeping electronic devices out of your child's room at night is a great step towards ensuring adequate sleep and rest. If you don't already have set rules about electronic  media use, check out the American Academy of Pediatrics healthychildren.org website (search for “media use plan”).    Safety On the Land and In the Water   Seatbelts do not fit properly until a child is taller than 4'9\" and weighs more than 80 pounds. Smaller kids in this age group won’t like riding in a booster seat but that is where they are safest. High-backed booster seats should be used if there are low seat backs or no head rests in your car; backless boosters can be used if your car has high seat backs and head rests. Children are safest in the back seat until they are 13 years old. Talk to your child about never getting into a car with someone who is under the influence of drugs or alcohol; let them know that they can always call you for help if this situation ever occurs.     Helmets and other protective gear should always be worn when biking, skating or skateboarding; they may be recommended for additional sports (kayaking, water polo, etc) for older children.  All-terrain vehicles (ATVs) are very dangerous and adolescents under 16 years of age should not be allowed to ride them. When on a boat or engaging in water sports, a [employer] approved lifejacket should always be worn.    Sun and Gun safety  Most teenagers love the sun, but the ultraviolet radiation of the sun causes skin damage (premature aging) and increases the risk of skin cancer. The same sun protection recommendations apply to all ages: do not spend time the sun without using sunscreen with SPF of 15 or higher, avoid prolonged exposure between 11 and 3 PM when the sun intensity is the highest, and wear sunglasses and other sun protective clothing. Use of tanning beds should not be permitted for adolescents.    If you have an adolescent with a history of depression, suicide attempts, or aggressive behaviors, it is very dangerous to keep a firearm in your home. If firearms are stored in your home, be sure they are stored unloaded and  locked with ammunition locked separately and be sure that children do not have access to the keys.    MEDICATION FOR FEVER OR PAIN:   Acetaminophen liquid (e.g., Tylenol or Tempra) may be given every four hours as needed for pain or fever. Acetaminophen liquid is less concentrated than the infant dropper bottle type. Be sure to check which product CONCENTRATION you are using.    CHILDREN’S Tylenol/Acetaminophen  (160 MG/5 mL)    Child’s Weight:  Dose:  36 - 47 pounds:    240 mg (7.5 mL (1 1/2 Teaspoons))  48 - 59 pounds:    320 mg (10.0 mL (2 Teaspoons))  60 - 71 pounds:    400 mg (12.5 mL (2 1/2 Teaspoons))  72 - 95 pounds:    480 mg (15.0 mL (3 Teaspoons))  Greater than 96 pounds:   640 mg (20.0 mL (4 Teaspoons))    CHILDREN’S Tylenol/Acetaminophen MELTAWAYS ( 80 MG tablets)    Child’s Weight:  Dose:  36 - 47 pounds:    240 mg (3 meltaway tablets)  48 - 59 pounds:    320 mg (4 meltaway tablets)  60 - 71 pounds:    400 mg (5 meltaway tablets)  72 - 95 pounds:    480 mg (6 meltaway tablets)  Greater than 96 pounds:   640 mg (8 meltaway tablets)    Emil (Jr) Tylenol/Acetaminophen MELTAWAYS (160 MG tablets)    Child’s Weight:  Dose:  36 - 47 pounds:    240 mg (1 1/2 meltaway tablets)  48 - 59 pounds:    320 mg (2 meltaway tablets)  60 - 71 pounds:    400 mg (2 1/2 meltaway tablets)  72 - 95 pounds:    480 mg (3 meltaway tablets)  Greater than 96 pounds:   640 mg (4 meltaway tablets)    CHILDREN'S Ibuprofen (e.g., Advil or Motrin) may be given every six hours as needed for pain or fever.    48 - 59 pounds:    200 mg (2 Teaspoons)  60 - 71 pounds:    250 mg (2 1/2 Teaspoons)  72 - 95 pounds:    300 mg (3 Teaspoons)        NEXT VISIT:  IN 1 YEAR    Thank you for entrusting your care to [facility].    Also, check out “Children’s Health” on the [facility] Blog for updates on timely topics regarding children’s health!

## 2024-09-20 NOTE — ED ADULT NURSE NOTE - CARDIO ASSESSMENT
Detail Level: Detailed Gentle Skin Care Counseling: I recommended use a gentle skin cleanser when washing the skin. I also recommended application of a moisturizer twice daily. Products with fragrances, preservatives and dyes should be avoided. ---

## 2025-06-19 NOTE — ED PROVIDER NOTE - DISCUSSED CLINICAL AND RADIOLOGICAL FINDINGS WITH, MDM
Patient arrives in wheelchair with c/o right hip, right knee, right shin pain after a med cart drawer fell on her this morning around 7:20am at work. Reprots swelling to right knee as well.    patient/family